# Patient Record
Sex: FEMALE | URBAN - METROPOLITAN AREA
[De-identification: names, ages, dates, MRNs, and addresses within clinical notes are randomized per-mention and may not be internally consistent; named-entity substitution may affect disease eponyms.]

---

## 2020-04-22 ENCOUNTER — HOSPITAL ENCOUNTER (OUTPATIENT)
Dept: TELEMEDICINE | Facility: HOSPITAL | Age: 80
Discharge: HOME | End: 2020-04-22
Payer: MEDICARE

## 2020-04-22 ENCOUNTER — HOSPITAL ENCOUNTER (INPATIENT)
Facility: HOSPITAL | Age: 80
LOS: 8 days | Discharge: NURSING, REHAB OR LONG-TERM CARE | DRG: 023 | End: 2020-04-30
Attending: STUDENT IN AN ORGANIZED HEALTH CARE EDUCATION/TRAINING PROGRAM | Admitting: PSYCHIATRY & NEUROLOGY
Payer: MEDICARE

## 2020-04-22 DIAGNOSIS — I63.512 ACUTE ISCHEMIC LEFT MCA STROKE: ICD-10-CM

## 2020-04-22 DIAGNOSIS — I63.232 STROKE DUE TO OCCLUSION OF LEFT CAROTID ARTERY: ICD-10-CM

## 2020-04-22 DIAGNOSIS — I63.232 ACUTE ISCHEMIC LEFT INTERNAL CAROTID ARTERY (ICA) STROKE: ICD-10-CM

## 2020-04-22 DIAGNOSIS — I10 ESSENTIAL HYPERTENSION: ICD-10-CM

## 2020-04-22 PROBLEM — E78.5 HYPERLIPIDEMIA: Status: ACTIVE | Noted: 2020-04-22

## 2020-04-22 PROBLEM — G93.6 CYTOTOXIC CEREBRAL EDEMA: Status: ACTIVE | Noted: 2020-04-22

## 2020-04-22 LAB
ABO + RH BLD: NORMAL
BLD GP AB SCN CELLS X3 SERPL QL: NORMAL
CHOLEST SERPL-MCNC: 220 MG/DL (ref 120–199)
CHOLEST/HDLC SERPL: 3.6 {RATIO} (ref 2–5)
ESTIMATED AVG GLUCOSE: 114 MG/DL (ref 68–131)
HBA1C MFR BLD HPLC: 5.6 % (ref 4–5.6)
HDLC SERPL-MCNC: 61 MG/DL (ref 40–75)
HDLC SERPL: 27.7 % (ref 20–50)
LDLC SERPL CALC-MCNC: 135.2 MG/DL (ref 63–159)
NONHDLC SERPL-MCNC: 159 MG/DL
POCT GLUCOSE: 105 MG/DL (ref 70–110)
POCT GLUCOSE: 88 MG/DL (ref 70–110)
SARS-COV-2 RDRP RESP QL NAA+PROBE: NEGATIVE
TRIGL SERPL-MCNC: 119 MG/DL (ref 30–150)
TSH SERPL DL<=0.005 MIU/L-ACNC: 3.99 UIU/ML (ref 0.4–4)

## 2020-04-22 PROCEDURE — 99223 PR INITIAL HOSPITAL CARE,LEVL III: ICD-10-PCS | Mod: ,,, | Performed by: PSYCHIATRY & NEUROLOGY

## 2020-04-22 PROCEDURE — 63600175 PHARM REV CODE 636 W HCPCS: Performed by: INTERNAL MEDICINE

## 2020-04-22 PROCEDURE — 94761 N-INVAS EAR/PLS OXIMETRY MLT: CPT

## 2020-04-22 PROCEDURE — 93005 ELECTROCARDIOGRAM TRACING: CPT

## 2020-04-22 PROCEDURE — 36620 INSERTION CATHETER ARTERY: CPT | Mod: ,,, | Performed by: PSYCHIATRY & NEUROLOGY

## 2020-04-22 PROCEDURE — 96374 THER/PROPH/DIAG INJ IV PUSH: CPT

## 2020-04-22 PROCEDURE — G0425 PR INPT TELEHEALTH CONSULT 30M: ICD-10-PCS | Mod: 95,G0,, | Performed by: PSYCHIATRY & NEUROLOGY

## 2020-04-22 PROCEDURE — 80061 LIPID PANEL: CPT

## 2020-04-22 PROCEDURE — 25000003 PHARM REV CODE 250: Performed by: STUDENT IN AN ORGANIZED HEALTH CARE EDUCATION/TRAINING PROGRAM

## 2020-04-22 PROCEDURE — 99223 1ST HOSP IP/OBS HIGH 75: CPT | Mod: ,,, | Performed by: PSYCHIATRY & NEUROLOGY

## 2020-04-22 PROCEDURE — 99223 1ST HOSP IP/OBS HIGH 75: CPT | Mod: AI,25,GC, | Performed by: PSYCHIATRY & NEUROLOGY

## 2020-04-22 PROCEDURE — 25000003 PHARM REV CODE 250: Performed by: PSYCHIATRY & NEUROLOGY

## 2020-04-22 PROCEDURE — 99499 UNLISTED E&M SERVICE: CPT | Mod: ,,, | Performed by: EMERGENCY MEDICINE

## 2020-04-22 PROCEDURE — 86850 RBC ANTIBODY SCREEN: CPT

## 2020-04-22 PROCEDURE — 93010 ELECTROCARDIOGRAM REPORT: CPT | Mod: ,,, | Performed by: INTERNAL MEDICINE

## 2020-04-22 PROCEDURE — 36620 ARTERIAL LINE: ICD-10-PCS | Mod: ,,, | Performed by: PSYCHIATRY & NEUROLOGY

## 2020-04-22 PROCEDURE — 99285 EMERGENCY DEPT VISIT HI MDM: CPT | Mod: 25

## 2020-04-22 PROCEDURE — 93010 EKG 12-LEAD: ICD-10-PCS | Mod: ,,, | Performed by: INTERNAL MEDICINE

## 2020-04-22 PROCEDURE — 99499 NO LOS: ICD-10-PCS | Mod: ,,, | Performed by: EMERGENCY MEDICINE

## 2020-04-22 PROCEDURE — 99223 PR INITIAL HOSPITAL CARE,LEVL III: ICD-10-PCS | Mod: AI,25,GC, | Performed by: PSYCHIATRY & NEUROLOGY

## 2020-04-22 PROCEDURE — 63600175 PHARM REV CODE 636 W HCPCS: Performed by: STUDENT IN AN ORGANIZED HEALTH CARE EDUCATION/TRAINING PROGRAM

## 2020-04-22 PROCEDURE — 84443 ASSAY THYROID STIM HORMONE: CPT

## 2020-04-22 PROCEDURE — 83036 HEMOGLOBIN GLYCOSYLATED A1C: CPT

## 2020-04-22 PROCEDURE — 20000000 HC ICU ROOM

## 2020-04-22 PROCEDURE — 63600175 PHARM REV CODE 636 W HCPCS

## 2020-04-22 PROCEDURE — 25000003 PHARM REV CODE 250: Performed by: INTERNAL MEDICINE

## 2020-04-22 PROCEDURE — 25500020 PHARM REV CODE 255: Performed by: EMERGENCY MEDICINE

## 2020-04-22 PROCEDURE — 96376 TX/PRO/DX INJ SAME DRUG ADON: CPT

## 2020-04-22 PROCEDURE — U0002 COVID-19 LAB TEST NON-CDC: HCPCS

## 2020-04-22 PROCEDURE — G0425 INPT/ED TELECONSULT30: HCPCS | Mod: 95,G0,, | Performed by: PSYCHIATRY & NEUROLOGY

## 2020-04-22 RX ORDER — HEPARIN SODIUM 1000 [USP'U]/ML
INJECTION, SOLUTION INTRAVENOUS; SUBCUTANEOUS CODE/TRAUMA/SEDATION MEDICATION
Status: COMPLETED | OUTPATIENT
Start: 2020-04-22 | End: 2020-04-22

## 2020-04-22 RX ORDER — MAGNESIUM SULFATE HEPTAHYDRATE 40 MG/ML
2 INJECTION, SOLUTION INTRAVENOUS
Status: DISCONTINUED | OUTPATIENT
Start: 2020-04-22 | End: 2020-04-26

## 2020-04-22 RX ORDER — NICARDIPINE HYDROCHLORIDE 0.2 MG/ML
1 INJECTION INTRAVENOUS CONTINUOUS
Status: DISCONTINUED | OUTPATIENT
Start: 2020-04-22 | End: 2020-04-24

## 2020-04-22 RX ORDER — FENTANYL CITRATE 50 UG/ML
INJECTION, SOLUTION INTRAMUSCULAR; INTRAVENOUS CODE/TRAUMA/SEDATION MEDICATION
Status: COMPLETED | OUTPATIENT
Start: 2020-04-22 | End: 2020-04-22

## 2020-04-22 RX ORDER — MAGNESIUM SULFATE HEPTAHYDRATE 40 MG/ML
4 INJECTION, SOLUTION INTRAVENOUS
Status: DISCONTINUED | OUTPATIENT
Start: 2020-04-22 | End: 2020-04-26

## 2020-04-22 RX ORDER — LIDOCAINE HYDROCHLORIDE 10 MG/ML
1 INJECTION INFILTRATION; PERINEURAL ONCE
Status: COMPLETED | OUTPATIENT
Start: 2020-04-22 | End: 2020-04-22

## 2020-04-22 RX ORDER — POTASSIUM CHLORIDE 7.45 MG/ML
40 INJECTION INTRAVENOUS
Status: DISCONTINUED | OUTPATIENT
Start: 2020-04-22 | End: 2020-04-26

## 2020-04-22 RX ORDER — ASPIRIN 81 MG/1
81 TABLET ORAL DAILY
Status: DISCONTINUED | OUTPATIENT
Start: 2020-04-23 | End: 2020-04-30 | Stop reason: HOSPADM

## 2020-04-22 RX ORDER — CLOPIDOGREL BISULFATE 75 MG/1
75 TABLET ORAL DAILY
Status: DISCONTINUED | OUTPATIENT
Start: 2020-04-23 | End: 2020-04-30 | Stop reason: HOSPADM

## 2020-04-22 RX ORDER — SODIUM CHLORIDE 0.9 % (FLUSH) 0.9 %
10 SYRINGE (ML) INJECTION
Status: DISCONTINUED | OUTPATIENT
Start: 2020-04-22 | End: 2020-04-26

## 2020-04-22 RX ORDER — ONDANSETRON 2 MG/ML
4 INJECTION INTRAMUSCULAR; INTRAVENOUS EVERY 8 HOURS PRN
Status: DISCONTINUED | OUTPATIENT
Start: 2020-04-22 | End: 2020-04-30 | Stop reason: HOSPADM

## 2020-04-22 RX ORDER — LIDOCAINE HYDROCHLORIDE 10 MG/ML
INJECTION INFILTRATION; PERINEURAL CODE/TRAUMA/SEDATION MEDICATION
Status: COMPLETED | OUTPATIENT
Start: 2020-04-22 | End: 2020-04-22

## 2020-04-22 RX ORDER — AMOXICILLIN 250 MG
1 CAPSULE ORAL 2 TIMES DAILY
Status: DISCONTINUED | OUTPATIENT
Start: 2020-04-22 | End: 2020-04-30 | Stop reason: HOSPADM

## 2020-04-22 RX ORDER — ATORVASTATIN CALCIUM 20 MG/1
40 TABLET, FILM COATED ORAL DAILY
Status: DISCONTINUED | OUTPATIENT
Start: 2020-04-23 | End: 2020-04-30 | Stop reason: HOSPADM

## 2020-04-22 RX ORDER — ONDANSETRON 2 MG/ML
4 INJECTION INTRAMUSCULAR; INTRAVENOUS ONCE
Status: COMPLETED | OUTPATIENT
Start: 2020-04-22 | End: 2020-04-22

## 2020-04-22 RX ORDER — POLYETHYLENE GLYCOL 3350 17 G/17G
17 POWDER, FOR SOLUTION ORAL DAILY
Status: DISCONTINUED | OUTPATIENT
Start: 2020-04-23 | End: 2020-04-30 | Stop reason: HOSPADM

## 2020-04-22 RX ORDER — ONDANSETRON 2 MG/ML
INJECTION INTRAMUSCULAR; INTRAVENOUS
Status: COMPLETED
Start: 2020-04-22 | End: 2020-04-22

## 2020-04-22 RX ORDER — FAMOTIDINE 20 MG/1
20 TABLET, FILM COATED ORAL 2 TIMES DAILY
Status: DISCONTINUED | OUTPATIENT
Start: 2020-04-22 | End: 2020-04-23

## 2020-04-22 RX ORDER — ACETAMINOPHEN 325 MG/1
650 TABLET ORAL EVERY 6 HOURS PRN
Status: DISCONTINUED | OUTPATIENT
Start: 2020-04-22 | End: 2020-04-30 | Stop reason: HOSPADM

## 2020-04-22 RX ADMIN — IOHEXOL 250 ML: 300 INJECTION, SOLUTION INTRAVENOUS at 05:04

## 2020-04-22 RX ADMIN — FENTANYL CITRATE 50 MCG: 50 INJECTION, SOLUTION INTRAMUSCULAR; INTRAVENOUS at 03:04

## 2020-04-22 RX ADMIN — FENTANYL CITRATE 25 MCG: 50 INJECTION, SOLUTION INTRAMUSCULAR; INTRAVENOUS at 04:04

## 2020-04-22 RX ADMIN — FENTANYL CITRATE 50 MCG: 50 INJECTION, SOLUTION INTRAMUSCULAR; INTRAVENOUS at 02:04

## 2020-04-22 RX ADMIN — LIDOCAINE HYDROCHLORIDE: 10 INJECTION, SOLUTION EPIDURAL; INFILTRATION; INTRACAUDAL; PERINEURAL at 06:04

## 2020-04-22 RX ADMIN — FENTANYL CITRATE 25 MCG: 50 INJECTION, SOLUTION INTRAMUSCULAR; INTRAVENOUS at 03:04

## 2020-04-22 RX ADMIN — HEPARIN SODIUM 3000 UNITS: 1000 INJECTION INTRAVENOUS; SUBCUTANEOUS at 02:04

## 2020-04-22 RX ADMIN — LIDOCAINE HYDROCHLORIDE 5 ML: 10 INJECTION, SOLUTION INFILTRATION; PERINEURAL at 02:04

## 2020-04-22 RX ADMIN — NICARDIPINE HYDROCHLORIDE 5 MG/HR: 0.2 INJECTION, SOLUTION INTRAVENOUS at 11:04

## 2020-04-22 RX ADMIN — FENTANYL CITRATE 50 MCG: 50 INJECTION, SOLUTION INTRAMUSCULAR; INTRAVENOUS at 04:04

## 2020-04-22 RX ADMIN — ONDANSETRON 4 MG: 2 INJECTION, SOLUTION INTRAMUSCULAR; INTRAVENOUS at 06:04

## 2020-04-22 NOTE — SUBJECTIVE & OBJECTIVE
No past medical history on file.  No past surgical history on file.   No current facility-administered medications on file prior to encounter.      No current outpatient medications on file prior to encounter.      Allergies: Patient has no known allergies.    No family history on file.  Social History     Tobacco Use    Smoking status: Not on file   Substance Use Topics    Alcohol use: Not on file    Drug use: Not on file     Review of Systems   Constitutional: Negative for chills and fever.   Respiratory: Negative for cough.    Gastrointestinal: Negative for nausea and vomiting.   Neurological: Positive for facial asymmetry, speech difficulty and weakness. Negative for numbness.   Psychiatric/Behavioral: Negative for agitation.        Objective:     Vitals:    Pulse: (!) 51  Rhythm: sinus bradycardia  BP: (!) 220/116  Resp: 16  ETCO2 (mmHg): 38 mmHg  SpO2: 100 %  O2 Device (Oxygen Therapy): room air    Pulse  Min: 50  Max: 76  BP  Min: 132/75  Max: 225/101  Resp  Min: 16  Max: 19  ETCO2 (mmHg)  Min: 28 mmHg  Max: 39 mmHg  SpO2  Min: 98 %  Max: 100 %    No intake/output data recorded.         Physical Exam:  GA: Alert, comfortable, no acute distress.   HEENT: No scleral icterus or JVD.   Pulmonary: Clear to auscultation A/L. No wheezing, crackles, or rhonchi.  Cardiac: RRR S1 & S2 w/o rubs/murmurs/gallops.   Abdominal: Bowel sounds present x 4. No appreciable hepatosplenomegaly.  Skin: No jaundice, rashes, or visible lesions.  Pulses: 1+ DP bilat     Neuro:  --sedation: none  --GCS: E4V2M5  --Mental Status: awake, Global aphasia, follows commands    --CN II-XII grossly intact, Facial Movement (CN VII): Lower facial weakness on the Right.   --Pupils 3-->2mm, PERRL.   --brainstem: intact  --Motor:   --Arm left  Normal 5/5  --Leg left  Normal 5/5  --Arm right  Normal 5/5  --Leg right Paresis: 4/5  --Sensation: Intact to light touch, temperature and vibration  --Tone: Normal tone throughout  --sensory: intact to  soft touch and pain throughout  --Reflexes: not tested  --Gait: deferred     Unable to test orientation, language, memory, judgment, insight, fund of knowledge, hearing, shoulder shrug, tongue protrusion, coordination, gait due to level of consciousness.    Today I personally reviewed pertinent medications, lines/drains/airways, imaging, cardiology results, laboratory results, microbiology results, notably:    No results found for: HGBA1C, CHOL, HDL, LDLCALC, LDLDIRECT, TRIG, SEDRATE, CRP, HSCRP, TSH, HIVRAPID, HIV1X2, [id number]

## 2020-04-22 NOTE — NURSING
MD holding manual pressure to right groin, hemostasis achieved @ 1725, unable to deploy angioseal, pt is to remain flat until 2125, dressing CDI, no bleeding, no hematoma noted, sbp goal 160-180 per MD, report given to [facility]

## 2020-04-22 NOTE — HOSPITAL COURSE
04/22/20: Patient admitted to [facility] s/p thrombectomy for L ICA occlusion  04/23/20: CARLOS EDUARDO, patient globally aphasic. Patient's friend updated as no family contact is available.  04/24/20: CARLOS EDUARDO, stepdown to vascular Neurology today

## 2020-04-22 NOTE — CONSULTS
[facility]  Vascular Neurology  Comprehensive Stroke Center  Consult Note    Inpatient consult to Vascular (Stroke) Neurology  Consult performed by: Sherice Marroquin PA-C  Consult ordered by: Bc Robles MD        Assessment/Plan:     Patient is a 79 y.o. 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 female with PMHx of HTN and HLD who presented to OSH with RSW and aphasia. She was seen via telemedicine and not eligible for tPA (last known normal yesterday). CTA obtained at OSH with L ICA occlusion and patient transferred to [facility] [place] intervention. CT head on arrival to C with ischemic changes in L insula. Patient going to IR for possible thrombectomy. Etiology unclear at this time, differential includes KIM vs ESUS.      Antithrombotics for secondary stroke prevention: Antiplatelets: Aspirin: 81 mg daily    Statins for secondary stroke prevention and hyperlipidemia, if present:   Statins: Atorvastatin- 40 mg daily    Aggressive risk factor modification: HTN, HLD     Rehab efforts: The patient has been evaluated by a stroke team provider and the therapy needs have been fully considered based off the presenting complaints and exam findings. The following therapy evaluations are needed: PT evaluate and treat, OT evaluate and treat, SLP evaluate and treat, PM&R evaluate for appropriate placement    Diagnostics ordered/pending: HgbA1C to assess blood glucose levels, Lipid Profile to assess cholesterol levels, MRI head without contrast to assess brain parenchyma, TTE to assess cardiac function/status , TSH to assess thyroid function    VTE prophylaxis: Heparin 5000 units SQ every 8 hours    BP parameters: Infarct: Post sucessful thrombectomy, SBP <140  Post unsucessful thrombectomy, SBP <220        Cytotoxic cerebral edema  Area of cytotoxic cerebral edema identified when reviewing brain imaging in the territory of  the L internal carotid artery. There is no mass effect associated with it. We will continue to monitor the patients clinical exam for any worsening of symptoms which may indicate expansion of the stroke or the area of the edema resulting in the clinical change. The pattern is suggestive of KIM vs ESUS etiology.        Hyperlipidemia  Stroke risk factor  Lipid panel pending  Home medications unknown  Recommend starting atorvastatin 40 mg daily    Essential hypertension  Stroke risk factor  SBP <220 for unsuccessful thrombectomy  SBP <140 for successful thrombectomy        STROKE DOCUMENTATION     Acute Stroke Times   Last Known Normal Date: 04/21/20  Last Known Normal Time: 1800  Symptom Onset Date: (unknown)  Symptom Onset Time: (unknown)  Stroke Team Called Date: 04/22/20  Stroke Team Called Time: 1415  Stroke Team Arrival Date: 04/22/20  Stroke Team Arrival Time: 1415  CT Interpretation Time: 1425  Decision to Treat Time for Alteplase: (decision made at OSH via telemedicine)  Decision to Treat Time for IR: 1425    NIH Scale:  Interval: baseline  1a. Level of Consciousness: 0-->Alert, keenly responsive  1b. LOC Questions: 2-->Answers neither question correctly  1c. LOC Commands: 2-->Performs neither task correctly  2. Best Gaze: 0-->Normal  3. Visual: 0-->No visual loss  4. Facial Palsy: 1-->Minor paralysis (flattened nasolabial fold, asymmetry on smiling)  5a. Motor Arm, Left: 0-->No drift, limb holds 90 (or 45) degrees for full 10 secs  5b. Motor Arm, Right: 0-->No drift, limb holds 90 (or 45) degrees for full 10 secs  6a. Motor Leg, Left: 0-->No drift, leg holds 30 degree position for full 5 secs  6b. Motor Leg, Right: 1-->Drift, leg falls by the end of the 5-sec period but does not hit bed  7. Limb Ataxia: 0-->Absent  8. Sensory: 0-->Normal, no sensory loss  9. Best Language: 3-->Mute, global aphasia, no usable speech or auditory comprehension  10. Dysarthria: 2-->Severe dysarthria, patients speech is so  slurred as to be unintelligible in the absence of or out of proportion to any dysphasia, or is mute/anarthric  11. Extinction and Inattention (formerly Neglect): 0-->No abnormality  Total (NIH Stroke Scale): 11    Modified [employer]    Jennifer Coma Scale:    ABCD2 Score:    SLIA2YQ3-[id number] Score:   HAS -BLED Score:   ICH Score:   Hunt & Dahl Classification:       Thrombolysis Candidate? No, Out of window     Delays to Thrombolysis?  No    Interventional Revascularization Candidate?   Is the patient eligible for mechanical endovascular reperfusion (MIK)?  Yes      Hemorrhagic change of an Ischemic Stroke: Does this patient have an ischemic stroke with hemorrhagic changes? No     Subjective:     History of Present Illness:  Teresa Vaughan is a 79 y.o. female with PMHx of HTN and HLD who presented to [facility] with aphasia and RSW. Patient went to ED after being found outside wandering around by her neighbors. She was seen via telemedicine and was not a candidate for tPA (last known normal yesterday). CTA at OSH revealed L ICA occlusion. Patient transferred to [facility] for possible IR intervention. Patient primarily Portuguese speaking and aphasic. Therefore, history obtained via chart review.         No past medical history on file.  No past surgical history on file.  No family history on file.  Social History     Tobacco Use    Smoking status: Not on file   Substance Use Topics    Alcohol use: Not on file    Drug use: Not on file     Review of patient's allergies indicates:  No Known Allergies    Medications: I have reviewed the current medication administration record.      (Not in a hospital admission)    Review of Systems   Constitutional: Negative for chills and fever.   Respiratory: Negative for cough.    Gastrointestinal: Negative for nausea and vomiting.   Neurological: Positive for facial asymmetry, speech difficulty and weakness. Negative for numbness.   Psychiatric/Behavioral: Negative for agitation.      Objective:     Vital Signs (Most Recent):  Pulse: 60 (04/22/20 1500)  Resp: 18 (04/22/20 1500)  BP: (!) 205/86 (04/22/20 1500)  SpO2: 99 % (04/22/20 1500)    Vital Signs Range (Last 24H):  Pulse:  [58-74]   Resp:  [18]   BP: (176-223)/()   SpO2:  [98 %-100 %]     Physical Exam   Constitutional: She appears well-developed and well-nourished. No distress.   HENT:   Head: Normocephalic and atraumatic.   Eyes: EOM are normal.   Cardiovascular: Normal rate.   Pulmonary/Chest: Effort normal. No respiratory distress.   Neurological: She is alert.   Skin: Skin is warm and dry.   Vitals reviewed.      Neurological Exam:   LOC: alert  Attention Span: poor  Language: Global aphasia  Articulation: Untestable due to severe aphasia   Orientation: Untestable due to severe aphasia   Visual Fields: Full  EOM (CN III, IV, VI): Full/intact  Facial Movement (CN VII): Lower facial weakness on the Right  Motor: Arm left  Normal 5/5  Leg left  Normal 5/5  Arm right  Normal 5/5  Leg right Paresis: 4/5  Sensation: Intact to light touch, temperature and vibration  Tone: Normal tone throughout      Laboratory:  CMP: No results for input(s): GLUCOSE, CALCIUM, ALBUMIN, PROT, NA, K, CO2, CL, BUN, CREATININE, ALKPHOS, ALT, AST, BILITOT in the last 24 hours.  CBC: No results for input(s): WBC, RBC, HGB, HCT, PLT, MCV, MCH, MCHC in the last 168 hours.  Lipid Panel: No results for input(s): CHOL, LDLCALC, HDL, TRIG in the last 168 hours.  Coagulation: No results for input(s): PT, INR, APTT in the last 168 hours.  Hgb A1C: No results for input(s): HGBA1C in the last 168 hours.  TSH: No results for input(s): TSH in the last 168 hours.    Diagnostic Results:      Brain imaging:  CT Head. Date: 04/22/20  Ill-defined region of decreased attenuation left insula anteriorly concerning for acute/recent infarction.  No significant mass effect or evidence for hemorrhagic conversion.    Vessel Imaging:  CTA OSH. Date: 04/22/20  L ICA  occlusion          Sherice Marroquin PA-C  Comprehensive Stroke Center  Department of Vascular Neurology   [facility]

## 2020-04-22 NOTE — HPI
Ms. Clement [facility] is a 79 year old lady with PMH significant for HTN, HLD who was transferred from [facility] with aphasia and RSW. Patient had initially been taken to the ED when her neighbors found her wandering around outside. She presented with right sided weakness and aphasia to the OSH ED and was evaluated via Tele stroke. Since she was last known well yesterday 04/21/20, she was not a candidate for TPA. CTA at OSH revealed L ICA occlusion and she was transferred to [facility] for possible IR intervention. Patient is primarily Faroese speaking and aphasic with no family present, Therefore all history was obtained via chart review. Patient tested negative for COVID19 and CT head on arrival to C revealed ischemic changes in L insula. She was taken to IR for possible thrombectomy with etiology of stroke unclear at this time. Likely embolic, differential includes KIM vs ESUS.  Patient admitted to [facility] s/p thrombectomy for higher level of care.

## 2020-04-22 NOTE — H&P
Inpatient Radiology Pre-procedure Note    History of Present Illness:  Teresa Vaughan is a 79 y.o. female who presents for Left ICA occlusion and aphasia for thrombectomy.    Admission H&P reviewed.  No past medical history on file.  No past surgical history on file.    Review of Systems:   As documented in primary team H&P    Home Meds:   Prior to Admission medications    Not on File     Scheduled Meds:   Continuous Infusions:   PRN Meds:  Anticoagulants/Antiplatelets: no anticoagulation    Allergies: Review of patient's allergies indicates:  No Known Allergies  Sedation Hx: have not been any systemic reactions    Labs:  No results for input(s): INR in the last 168 hours.    Invalid input(s):  PT,  PTT  No results for input(s): WBC, HGB, HCT, MCV, PLT in the last 168 hours. No results for input(s): GLU, NA, K, CL, CO2, BUN, CREATININE, CALCIUM, MG, ALT, AST, ALBUMIN, BILITOT, BILIDIR in the last 168 hours.      Vitals:  Pulse: 74 (04/22/20 1418)  Resp: 18 (04/22/20 1418)  BP: (!) 202/93 (04/22/20 1418)  SpO2: 99 % (04/22/20 1418)     Physical Exam:  ASA: 3  Mallampati: 2    Aphasia    Plan: Left ICA thrombectomy  Sedation Plan: Moderate sedation    Harpreet Dominguez MD  NeuroIR fellow.

## 2020-04-22 NOTE — PROGRESS NOTES
Patient arrived to [facility] from [facility] ED >  > [facility] 7[address]    Type of stroke/diagnosis: ICA Occlusion - TICI 2A    Thrombectomy start and end time: 1725    Current symptoms: Moves all 4 extremities (RUE - contracted),     Skin assessment done: Yes   Wounds noted: None   RUSSELL Armband Applied: Yes    [facility] notified: MD Bc

## 2020-04-22 NOTE — PROCEDURES
Teresa Vaughan is a 79 y.o. female patient.    Temp: 97.6 °F (36.4 °C) (04/22/20 1810)  Pulse: (!) 55 (04/22/20 1842)  Resp: 14 (04/22/20 1842)  BP: (!) 142/57 (04/22/20 1842)  SpO2: 96 % (04/22/20 1842)  Weight: 51 kg (112 lb 7 oz) (04/22/20 1740)       Arterial Line  Date/Time: 4/22/2020 6:45 PM  Location procedure was performed: [facility]  Performed by: Bc Robles MD  Authorized by: Bc Robles MD   Assisting provider: Kenneth Morgan MD  Pre-op Diagnosis: Left ICA Stenosis  Post-operative diagnosis: left ICA stenosis  Consent Done: Emergent Situation  Indications: multiple ABGs and hemodynamic monitoring  Location: left radial    Anesthesia:  Local Anesthetic: lidocaine 1% without epinephrine  Needle gauge: 22  Seldinger technique: Seldinger technique used  Number of attempts: 1  Technical procedures used: n/a  Significant surgical tasks conducted by the assistant(s): N/A  Complications: No  Specimens: No  Implants: No  Post-procedure: dressing applied  Post-procedure CMS: normal  Patient tolerance: Patient tolerated the procedure well with no immediate complications          Bc Robles  4/22/2020

## 2020-04-22 NOTE — ASSESSMENT & PLAN NOTE
Teresa Taylor is a 79 year old female who was LKN on 04/21/20 found wandering around outside by neighbors today 04/22/20, taken to the ED presenting with RSW and aphasia, CTA at OSH revealed L ICA occlusion and patient was transferred to [facility] for thrombectomy. Patient admitted to [facility] s/p acute intervention. Patient is primarily [place] Islander speaking and aphasic with no family present, Therefore all history was obtained via chart review. Etiology of stroke unclear at this time. Likely embolic, differential includes KIM vs ESUS.    Neuro:      Embolic stroke involving left Internal Carotid Artery  -S/p thrombectomy, 4/22 TICI 2A  -neuro checks and vital signs q30 min x 6hrs post procedure, then q1hr x 16hrs  -aspirin 325 and statin 40mg daily   -Plavix 75mg daily  -MRI first tomorrow then start DAPT        Pulmonary:      Supportive oxygenation  -monitor with continuous pulse oximetry  -titrate supplemental 02 to achieve sats > 92%       Cardiac:      Hypertension  -if needed, start nicardipene gtt @5mg/hr   -goal SBP<140        Renal:       Fluid balance   -strict I/Os        ID:      COVID negative, afebrile, no Leukocytosis     Hem/Onc:      Monitor H/H closely    -aspirin 325 daily,      Endocrine:     Follow A1C results  Glucose control  -POCT q6hrs      Fluids/Electrolytes/Nutrition/GI:      -NPO until speech evaluation   -NG tube for meds

## 2020-04-22 NOTE — H&P
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 Clement [facility] is a 79 year old lady with PMH significant for HTN, HLD who was transferred from [facility] with aphasia and RSW. Patient had initially been taken to the ED when her neighbors found her wandering around outside. She presented with right sided weakness and aphasia to the OSH ED and was evaluated via Tele stroke. Since she was last known well yesterday 04/21/20, she was not a candidate for TPA. CTA at OSH revealed L ICA occlusion and she was transferred to [facility] for possible IR intervention. Patient is primarily Swedish speaking and aphasic with no family present, Therefore all history was obtained via chart review. Patient tested negative for COVID19 and CT head on arrival to [facility] revealed ischemic changes in L insula. She was taken to IR for possible thrombectomy with etiology of stroke unclear at this time. Likely embolic, differential includes KIM vs ESUS.  Patient admitted to [facility] s/p thrombectomy for higher level of care.    No past medical history on file.  No past surgical history on file.   No current facility-administered medications on file prior to encounter.      No current outpatient medications on file prior to encounter.      Allergies: Patient has no known allergies.    No family history on file.  Social History     Tobacco Use    Smoking status: Not on file   Substance Use Topics    Alcohol use: Not on file    Drug use: Not on file     Review of Systems   Constitutional: Negative for chills and fever.   Respiratory: Negative for cough.    Gastrointestinal: Negative for nausea and vomiting.   Neurological: Positive for facial asymmetry, speech difficulty and weakness. Negative for numbness.    Psychiatric/Behavioral: Negative for agitation.        Objective:     Vitals:    Pulse: (!) 51  Rhythm: sinus bradycardia  BP: (!) 220/116  Resp: 16  ETCO2 (mmHg): 38 mmHg  SpO2: 100 %  O2 Device (Oxygen Therapy): room air    Pulse  Min: 50  Max: 76  BP  Min: 132/75  Max: 225/101  Resp  Min: 16  Max: 19  ETCO2 (mmHg)  Min: 28 mmHg  Max: 39 mmHg  SpO2  Min: 98 %  Max: 100 %    No intake/output data recorded.         Physical Exam:  GA: Alert, comfortable, no acute distress.   HEENT: No scleral icterus or JVD.   Pulmonary: Clear to auscultation A/L. No wheezing, crackles, or rhonchi.  Cardiac: RRR S1 & S2 w/o rubs/murmurs/gallops.   Abdominal: Bowel sounds present x 4. No appreciable hepatosplenomegaly.  Skin: No jaundice, rashes, or visible lesions.  Pulses: 1+ DP bilat     Neuro:  --sedation: none  --GCS: E4V2M5  --Mental Status: awake, Global aphasia, follows commands    --CN II-XII grossly intact, Facial Movement (CN VII): Lower facial weakness on the Right.   --Pupils 3-->2mm, PERRL.   --brainstem: intact  --Motor:   --Arm left  Normal 5/5  --Leg left  Normal 5/5  --Arm right  Normal 5/5  --Leg right Paresis: 4/5  --Sensation: Intact to light touch, temperature and vibration  --Tone: Normal tone throughout  --sensory: intact to soft touch and pain throughout  --Reflexes: not tested  --Gait: deferred     Unable to test orientation, language, memory, judgment, insight, fund of knowledge, hearing, shoulder shrug, tongue protrusion, coordination, gait due to level of consciousness.    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 Patient admitted to [facility] s/p acute intervention. Patient is primarily Persian speaking and aphasic with no family present, Therefore all history was obtained via chart review. Etiology of stroke unclear at this time. Likely embolic, differential includes KIM vs ESUS.    Neuro:      Embolic stroke involving left Internal Carotid Artery  -S/p thrombectomy, 4/22 TICI 2A  -neuro checks and vital signs q30 min x 6hrs post procedure, then q1hr x 16hrs  -aspirin 325 and statin 40mg daily   -Plavix 75mg daily  -MRI first tomorrow then start DAPT        Pulmonary:      Supportive oxygenation  -monitor with continuous pulse oximetry  -titrate supplemental 02 to achieve sats > 92%       Cardiac:      Hypertension  -if needed, start nicardipene gtt @5mg/hr   -goal SBP<140        Renal:       Fluid balance   -strict I/Os        ID:      COVID negative, afebrile, no Leukocytosis     Hem/Onc:      Monitor H/H closely    -aspirin 325 daily,      Endocrine:     Follow A1C results  Glucose control  -POCT q6hrs      Fluids/Electrolytes/Nutrition/GI:      -NPO until speech evaluation   -NG tube for meds          Cytotoxic cerebral edema  Monitor closely, repeat head imaging MRI in 24hrs    Cardiac/Vascular  Hyperlipidemia  Atorvastatin 40mg daily    Essential hypertension  Goal SBP <140          The patient is being Prophylaxed for:  Venous Thromboembolism with: Mechanical  Stress Ulcer with: Not Applicable   Ventilator Pneumonia with: not applicable    Activity Orders          Diet NPO: NPO starting at 04/22 1608        Full Code    Bc Robles MD  Neurocritical Care Fellow  Neurocritical care  [facility]

## 2020-04-22 NOTE — SUBJECTIVE & OBJECTIVE
No past medical history on file.  No past surgical history on file.  No family history on file.  Social History     Tobacco Use    Smoking status: Not on file   Substance Use Topics    Alcohol use: Not on file    Drug use: Not on file     Review of patient's allergies indicates:  No Known Allergies    Medications: I have reviewed the current medication administration record.      (Not in a hospital admission)    Review of Systems   Constitutional: Negative for chills and fever.   Respiratory: Negative for cough.    Gastrointestinal: Negative for nausea and vomiting.   Neurological: Positive for facial asymmetry, speech difficulty and weakness. Negative for numbness.   Psychiatric/Behavioral: Negative for agitation.     Objective:     Vital Signs (Most Recent):  Pulse: 60 (04/22/20 1500)  Resp: 18 (04/22/20 1500)  BP: (!) 205/86 (04/22/20 1500)  SpO2: 99 % (04/22/20 1500)    Vital Signs Range (Last 24H):  Pulse:  [58-74]   Resp:  [18]   BP: (176-223)/()   SpO2:  [98 %-100 %]     Physical Exam   Constitutional: She appears well-developed and well-nourished. No distress.   HENT:   Head: Normocephalic and atraumatic.   Eyes: EOM are normal.   Cardiovascular: Normal rate.   Pulmonary/Chest: Effort normal. No respiratory distress.   Neurological: She is alert.   Skin: Skin is warm and dry.   Vitals reviewed.      Neurological Exam:   LOC: alert  Attention Span: poor  Language: Global aphasia  Articulation: Untestable due to severe aphasia   Orientation: Untestable due to severe aphasia   Visual Fields: Full  EOM (CN III, IV, VI): Full/intact  Facial Movement (CN VII): Lower facial weakness on the Right  Motor: Arm left  Normal 5/5  Leg left  Normal 5/5  Arm right  Normal 5/5  Leg right Paresis: 4/5  Sensation: Intact to light touch, temperature and vibration  Tone: Normal tone throughout      Laboratory:  CMP: No results for input(s): GLUCOSE, CALCIUM, ALBUMIN, PROT, NA, K, CO2, CL, BUN, CREATININE, ALKPHOS,  ALT, AST, BILITOT in the last 24 hours.  CBC: No results for input(s): WBC, RBC, HGB, HCT, PLT, MCV, MCH, MCHC in the last 168 hours.  Lipid Panel: No results for input(s): CHOL, LDLCALC, HDL, TRIG in the last 168 hours.  Coagulation: No results for input(s): PT, INR, APTT in the last 168 hours.  Hgb A1C: No results for input(s): HGBA1C in the last 168 hours.  TSH: No results for input(s): TSH in the last 168 hours.    Diagnostic Results:      Brain imaging:  CT Head. Date: 04/22/20  Ill-defined region of decreased attenuation left insula anteriorly concerning for acute/recent infarction.  No significant mass effect or evidence for hemorrhagic conversion.    Vessel Imaging:  CTA OSH. Date: 04/22/20  L ICA occlusion

## 2020-04-22 NOTE — CONSULTS
[facility]  Vascular Neurology  Comprehensive Stroke Center  Tele-Consultation Note      Consults    Consulting Provider: HAMLET HOPKINS  Current Providers  No providers found    Patient Location: [facility] ED [facility] Emergency Department  Spoke hospital nurse at bedside with patient assisting consultant.     Patient information was obtained from EMS personnel.         Assessment/Plan:     STROKE DOCUMENTATION     Acute Stroke Times:   Acute Stroke Times   Last Known Normal Date: 04/21/20  Last Known Normal Time: 1800  Stroke Team Called Date: 04/22/20  Stroke Team Called Time: 0939  Stroke Team Arrival Date: 04/22/20  Stroke Team Arrival Time: 0945  CT Interpretation Time: 0945    NIH Scale:  1a. Level of Consciousness: 0-->Alert, keenly responsive  1b. LOC Questions: 2-->Answers neither question correctly  1c. LOC Commands: 2-->Performs neither task correctly  2. Best Gaze: 0-->Normal  3. Visual: 0-->No visual loss  4. Facial Palsy: 2-->Partial paralysis (total or near-total paralysis of lower face)  5a. Motor Arm, Left: 0-->No drift, limb holds 90 (or 45) degrees for full 10 secs  5b. Motor Arm, Right: 0-->No drift, limb holds 90 (or 45) degrees for full 10 secs  6a. Motor Leg, Left: 0-->No drift, leg holds 30 degree position for full 5 secs  6b. Motor Leg, Right: 1-->Drift, leg falls by the end of the 5-sec period but does not hit bed  7. Limb Ataxia: 0-->Absent  8. Sensory: 0-->Normal, no sensory loss  9. Best Language: 3-->Mute, global aphasia, no usable speech or auditory comprehension  10. Dysarthria: 2-->Severe dysarthria, patients speech is so slurred as to be unintelligible in the absence of or out of proportion to any dysphasia, or is mute/anarthric  11. Extinction and Inattention (formerly Neglect): 0-->No abnormality  Total (NIH Stroke Scale): 12     Modified [place]    Jennifer Coma Scale:    ABCD2 Score:    [id number]  Score:   HAS -BLED Score:   ICH Score:   Hunt & Dahl Classification:       Diagnoses:   Acute ischemic left MCA stroke  80 y/o woman presenting with aphasia, slight R sided weakness.  LKN yesterday evening, so not a tPA candidate.  Recommend STAT CTA head/neck, as patient may be candidate for thrombectomy.        Antithrombotics for secondary stroke prevention: Antiplatelets: Aspirin: 325 mg daily    Statins for secondary stroke prevention and hyperlipidemia, if present:   Statins: Atorvastatin- 40 mg daily    Aggressive risk factor modification: HTN     Rehab efforts: The patient has been evaluated by a stroke team provider and the therapy needs have been fully considered based off the presenting complaints and exam findings. The following therapy evaluations are needed: PT evaluate and treat, OT evaluate and treat, SLP evaluate and treat, PM&R evaluate for appropriate placement    Diagnostics ordered/pending: CTA Head to assess vasculature , CTA Neck/Arch to assess vasculature, HgbA1C to assess blood glucose levels, Lipid Profile to assess cholesterol levels, MRI head without contrast to assess brain parenchyma, TTE to assess cardiac function/status , TSH to assess thyroid function    VTE prophylaxis: Heparin 5000 units SQ every 8 hours    BP parameters: Infarct: No intervention, SBP <220            There were no vitals taken for this visit.  Alteplase Eligible?: No  Alteplase Recommendation: Alteplase not recommended due to Outside of treatment window   Possible Interventional Revascularization Candidate? Yes --> CTA pending    Disposition Recommendation: pending further studies    Subjective:     History of Present Illness:  80 y/o woman with PMH HTN, HLD, presenting for AMS, aphasia.  Patient was found wandering outside by her neighbors this morning.  She had R facial droop and was not speaking.  LKN 1800.  Patient is Ivorian speaking and an interpretor was used for the exam.      Woke up with symptoms?:  yes    Recent bleeding noted: unknown  Does the patient take any Blood Thinners? unknown  Medications: No relevant medications      Past Medical History: hypertension and hyperlipidemia    Past Surgical History: unable to obtain    Family History: no relevant history    Social History: no smoking, no drinking, no drugs    Allergies: Allergies have not been reviewed No relevant allergies    Review of Systems   Unable to perform ROS: Patient nonverbal     Objective:   Vitals: There were no vitals taken for this visit. BP: 170/83    CT READ: Yes  No hemmorhage. No mass effect. No early infarct signs.     Physical Exam   Constitutional: No distress.   HENT:   Head: Normocephalic and atraumatic.   Eyes: Pupils are equal, round, and reactive to light. EOM are normal.   Pulmonary/Chest: Effort normal.   Neurological:   MS: Alert, no speech output, does not follow commands, no neglect  CN: PERRL, EOMI, R facial droop  Motor: no arm drift   Slight R leg drift  Sens: intact to LT  Coord: GINNY                 Recommended the emergency room physician to have a brief discussion with the patient and/or family if available regarding the risks and benefits of treatment, and to briefly document the occurrence of that discussion in his clinical encounter note.     The attending portion of this evaluation, treatment, and documentation was performed per Bisi Parker MD via audiovisual.    Billing code:  (time dependent stroke, complex case, unstable patient, hemorrhages, any intervention, some mimics)    · This patient has a very critical neurological condition/illness, with very high morbidity and mortality.  · There is a very high probability for acute neurological change leading to clinical and possibly life-threatening deterioration requiring highest level of physician preparedness for urgent intervention.  · There is possibility that this condition will require treatment with high risk medications as quickly as  possible.  · There is also a possibility that the patient may benefit from further, more advance complex therapies (e.g. endovascular therapy) that will require prompt diagnosis and care.  · Care was coordinated with other physicians involved in the patient's care.  · Radiologic studies and laboratory data were reviewed and interpreted, and plan of care was re-assessed based on the results.  · Diagnosis, treatment options and prognosis may have been discussed with the patient and/or family members or caregiver.  · Further advanced medical management and further evaluation is warranted for his care.      In your opinion, this was a: Tier 1 Van Positive    Consult End Time: 10:18 AM     Bisi Parker MD  Comprehensive Stroke Center  Vascular Neurology   [facility]

## 2020-04-22 NOTE — NURSING
Pt arrived to IR room 190 for thrombectomy. Pt oriented to unit and staff. Plan of care reviewed with patient, patient verbalizes understanding. Comfort measures utilized. Pt safely transferred from stretcher to procedural table. Fall risk reviewed with patient, fall risk interventions maintained. Safety strap applied, positioner pillows utilized to minimize pressure points. Blankets applied. Pt prepped and draped utilizing standard sterile technique. Patient placed on continuous monitoring, as required by sedation policy. Timeouts completed utilizing standard universal time-out, per department and facility policy. RN to remain at bedside, continuous monitoring maintained. Pt resting comfortably. Denies pain/discomfort. Will continue to monitor. See flow sheets for monitoring, medication administration, and updates.

## 2020-04-22 NOTE — ED PROVIDER NOTES
Encounter Date: 4/22/2020       History     Chief Complaint   Patient presents with    Transfer     Trasfer from [facility], Left MCA.       80 yo F presents from OSH with reported CVA.  Transferred for IR.  Patient arrived in the department and was immediately greeted by the stroke team.  Taken for noncontrast head CT.  From head CT was immediately taken to IR.  The patient was never physically placed in ED bed.  Because of this I never had the opportunity to physically see the patient.        Review of patient's allergies indicates:  No Known Allergies  No past medical history on file.  No past surgical history on file.  No family history on file.  Social History     Tobacco Use    Smoking status: Not on file   Substance Use Topics    Alcohol use: Not on file    Drug use: Not on file     Review of Systems   Unable to perform ROS: Other       Physical Exam     Initial Vitals [04/22/20 1418]   BP Pulse Resp Temp SpO2   (!) 202/93 74 18 -- 99 %      MAP       --         Physical Exam  Unable to perform  ED Course   Procedures  Labs Reviewed   SARS-COV-2 RNA AMPLIFICATION, QUAL          Imaging Results          CT Head Without Contrast (In process)  Result time 04/22/20 14:27:34             Both corona virus and head CT results pending when the patient went upstairs.     Medical Decision Making:   History:   Old Medical Records: I decided to obtain old medical records.  Clinical Tests:   Lab Tests: Ordered  Radiological Study: Ordered  ED Management:  79-year-old female with acute CVA.  Vitals with hypertension.  Taken to IR from the CT scanner.  Care assumed by stroke team.  Unable to do a face-to-face encounter with the patient per HPI.  Other:   I have discussed this case with another health care provider.       <> Summary of the Discussion: Stroke                                 Clinical Impression:   Stroke      Disposition:   Disposition: Admitted  Condition: Serious                        Brijesh Pan  MD  04/22/20 3513

## 2020-04-22 NOTE — PROCEDURES
Neurointerventional Radiology Post-Procedure Note    Pre Op Diagnosis: Left ICA oclcusion    Post Op Diagnosis: Left ICA occlusion s/p angioplasty and aspiration thrombectomy    Procedure: Cerebral angiogram    Procedure performed by: Bala WATERS, Onur Dominguez MD.    Written Informed Consent Obtained: Yes    Specimen Removed: NO    Estimated Blood Loss: 200     Procedure report:     A 8F sheath was placed into the right femoral artery and a 5F Gianni catheter was advanced into the aortic arch.  The Left ICA arteries were subselected and angiography of the brain was performed after injection into each of these vessels.    Preliminary interpretation: Left ICA occlusion was treated using Avaitor Balloon angioplasty. Left terminal ICA occlusion was reperfused with TICI 2A score on there left MCA using aspiration catheter.  Please see Imaging report for full details.    A right femoral artery angiogram was performed, the sheath removed and hemostasis achieved using manual pressure.  No hematoma was present at the time of hemostasis.-    The patient tolerated the procedure well.     Harpreet Dominguez MD  NeuroInterventional Radiology Fellow

## 2020-04-22 NOTE — ASSESSMENT & PLAN NOTE
Stroke risk factor  Lipid panel pending  Home medications unknown  Recommend starting atorvastatin 40 mg daily

## 2020-04-22 NOTE — SUBJECTIVE & OBJECTIVE
Woke up with symptoms?: yes    Recent bleeding noted: unknown  Does the patient take any Blood Thinners? unknown  Medications: No relevant medications      Past Medical History: hypertension and hyperlipidemia    Past Surgical History: unable to obtain    Family History: no relevant history    Social History: no smoking, no drinking, no drugs    Allergies: Allergies have not been reviewed No relevant allergies    Review of Systems   Unable to perform ROS: Patient nonverbal     Objective:   Vitals: There were no vitals taken for this visit. BP: 170/83    CT READ: Yes  No hemmorhage. No mass effect. No early infarct signs.     Physical Exam   Constitutional: No distress.   HENT:   Head: Normocephalic and atraumatic.   Eyes: Pupils are equal, round, and reactive to light. EOM are normal.   Pulmonary/Chest: Effort normal.   Neurological:   MS: Alert, no speech output, does not follow commands, no neglect  CN: PERRL, EOMI, R facial droop  Motor: no arm drift   Slight R leg drift  Sens: intact to LT  Coord: GINNY

## 2020-04-22 NOTE — HPI
80 y/o woman with PMH HTN, HLD, presenting for AMS, aphasia.  Patient was found wandering outside by her neighbors this morning.  She had R facial droop and was not speaking.  LKN 1800.  Patient is Frisian speaking and an interpretor was used for the exam.

## 2020-04-22 NOTE — HPI
Teresa Vaughan is a 79 y.o. female with PMHx of HTN and HLD who presented to [facility] with aphasia and RSW. Patient went to ED after being found outside wandering around by her neighbors. She was seen via telemedicine and was not a candidate for tPA (last known normal yesterday). CTA at OSH revealed L ICA occlusion. Patient transferred to [facility] for possible IR intervention. Patient primarily Mohawk speaking and aphasic. Therefore, history obtained via chart review.

## 2020-04-22 NOTE — ASSESSMENT & PLAN NOTE
Teresa Vaughan is a 79 y.o. female with PMHx of HTN and HLD who presented to OSH with RSW and aphasia. She was seen via telemedicine and not eligible for tPA (last known normal yesterday). CTA obtained at OSH with L ICA occlusion and patient transferred to [facility] for possible IR intervention. CT head on arrival to [facility] with ischemic changes in L insula. Patient going to IR for possible thrombectomy. Etiology unclear at this time, differential includes KIM vs ESUS.      Antithrombotics for secondary stroke prevention: Antiplatelets: Aspirin: 81 mg daily    Statins for secondary stroke prevention and hyperlipidemia, if present:   Statins: Atorvastatin- 40 mg daily    Aggressive risk factor modification: HTN, HLD     Rehab efforts: The patient has been evaluated by a stroke team provider and the therapy needs have been fully considered based off the presenting complaints and exam findings. The following therapy evaluations are needed: PT evaluate and treat, OT evaluate and treat, SLP evaluate and treat, PM&R evaluate for appropriate placement    Diagnostics ordered/pending: HgbA1C to assess blood glucose levels, Lipid Profile to assess cholesterol levels, MRI head without contrast to assess brain parenchyma, TTE to assess cardiac function/status , TSH to assess thyroid function    VTE prophylaxis: Heparin 5000 units SQ every 8 hours    BP parameters: Infarct: Post sucessful thrombectomy, SBP <140  Post unsucessful thrombectomy, SBP <220

## 2020-04-22 NOTE — ASSESSMENT & PLAN NOTE
78 y/o woman presenting with aphasia, slight R sided weakness.  LKN yesterday evening, so not a tPA candidate.  Recommend STAT CTA head/neck, as patient may be candidate for thrombectomy.        Antithrombotics for secondary stroke prevention: Antiplatelets: Aspirin: 325 mg daily    Statins for secondary stroke prevention and hyperlipidemia, if present:   Statins: Atorvastatin- 40 mg daily    Aggressive risk factor modification: HTN     Rehab efforts: The patient has been evaluated by a stroke team provider and the therapy needs have been fully considered based off the presenting complaints and exam findings. The following therapy evaluations are needed: PT evaluate and treat, OT evaluate and treat, SLP evaluate and treat, PM&R evaluate for appropriate placement    Diagnostics ordered/pending: CTA Head to assess vasculature , CTA Neck/Arch to assess vasculature, HgbA1C to assess blood glucose levels, Lipid Profile to assess cholesterol levels, MRI head without contrast to assess brain parenchyma, TTE to assess cardiac function/status , TSH to assess thyroid function    VTE prophylaxis: Heparin 5000 units SQ every 8 hours    BP parameters: Infarct: No intervention, SBP <220

## 2020-04-22 NOTE — ASSESSMENT & PLAN NOTE
Area of cytotoxic cerebral edema identified when reviewing brain imaging in the territory of the L internal carotid artery. There is no mass effect associated with it. We will continue to monitor the patients clinical exam for any worsening of symptoms which may indicate expansion of the stroke or the area of the edema resulting in the clinical change. The pattern is suggestive of KIM vs ESUS etiology.

## 2020-04-23 LAB
ALBUMIN SERPL BCP-MCNC: 3.6 G/DL (ref 3.5–5.2)
ALP SERPL-CCNC: 73 U/L (ref 55–135)
ALT SERPL W/O P-5'-P-CCNC: 14 U/L (ref 10–44)
ANION GAP SERPL CALC-SCNC: 10 MMOL/L (ref 8–16)
ASCENDING AORTA: 2.41 CM
AST SERPL-CCNC: 22 U/L (ref 10–40)
AV INDEX (PROSTH): 0.65
AV MEAN GRADIENT: 7 MMHG
AV PEAK GRADIENT: 14 MMHG
AV VALVE AREA: 1.97 CM2
AV VELOCITY RATIO: 0.59
BASOPHILS # BLD AUTO: 0.04 K/UL (ref 0–0.2)
BASOPHILS NFR BLD: 0.4 % (ref 0–1.9)
BILIRUB SERPL-MCNC: 0.6 MG/DL (ref 0.1–1)
BILIRUB UR QL STRIP: NEGATIVE
BSA FOR ECHO PROCEDURE: 1.43 M2
BUN SERPL-MCNC: 9 MG/DL (ref 8–23)
CALCIUM SERPL-MCNC: 8.5 MG/DL (ref 8.7–10.5)
CHLORIDE SERPL-SCNC: 107 MMOL/L (ref 95–110)
CLARITY UR REFRACT.AUTO: CLEAR
CO2 SERPL-SCNC: 22 MMOL/L (ref 23–29)
COLOR UR AUTO: ABNORMAL
CREAT SERPL-MCNC: 0.7 MG/DL (ref 0.5–1.4)
CV ECHO LV RWT: 0.36 CM
DIFFERENTIAL METHOD: ABNORMAL
DOP CALC AO PEAK VEL: 1.84 M/S
DOP CALC AO VTI: 39.47 CM
DOP CALC LVOT AREA: 3 CM2
DOP CALC LVOT DIAMETER: 1.96 CM
DOP CALC LVOT PEAK VEL: 1.09 M/S
DOP CALC LVOT STROKE VOLUME: 77.92 CM3
DOP CALCLVOT PEAK VEL VTI: 25.84 CM
E WAVE DECELERATION TIME: 260.12 MSEC
E/A RATIO: 0.77
E/E' RATIO: 10.57 M/S
ECHO LV POSTERIOR WALL: 0.78 CM (ref 0.6–1.1)
EOSINOPHIL # BLD AUTO: 0 K/UL (ref 0–0.5)
EOSINOPHIL NFR BLD: 0.4 % (ref 0–8)
ERYTHROCYTE [DISTWIDTH] IN BLOOD BY AUTOMATED COUNT: 13.5 % (ref 11.5–14.5)
EST. GFR  (AFRICAN AMERICAN): >60 ML/MIN/1.73 M^2
EST. GFR  (NON AFRICAN AMERICAN): >60 ML/MIN/1.73 M^2
FRACTIONAL SHORTENING: 38 % (ref 28–44)
GLUCOSE SERPL-MCNC: 101 MG/DL (ref 70–110)
GLUCOSE UR QL STRIP: NEGATIVE
HCT VFR BLD AUTO: 35.6 % (ref 37–48.5)
HGB BLD-MCNC: 11.4 G/DL (ref 12–16)
HGB UR QL STRIP: ABNORMAL
IMM GRANULOCYTES # BLD AUTO: 0.02 K/UL (ref 0–0.04)
IMM GRANULOCYTES NFR BLD AUTO: 0.2 % (ref 0–0.5)
INTERVENTRICULAR SEPTUM: 0.69 CM (ref 0.6–1.1)
IVRT: 111.32 MSEC
KETONES UR QL STRIP: ABNORMAL
LA MAJOR: 5.59 CM
LA MINOR: 5.08 CM
LA WIDTH: 3.59 CM
LEFT ATRIUM SIZE: 3.32 CM
LEFT ATRIUM VOLUME INDEX: 37.7 ML/M2
LEFT ATRIUM VOLUME: 53.93 CM3
LEFT INTERNAL DIMENSION IN SYSTOLE: 2.68 CM (ref 2.1–4)
LEFT VENTRICLE DIASTOLIC VOLUME INDEX: 58.15 ML/M2
LEFT VENTRICLE DIASTOLIC VOLUME: 83.21 ML
LEFT VENTRICLE MASS INDEX: 66 G/M2
LEFT VENTRICLE SYSTOLIC VOLUME INDEX: 18.6 ML/M2
LEFT VENTRICLE SYSTOLIC VOLUME: 26.58 ML
LEFT VENTRICULAR INTERNAL DIMENSION IN DIASTOLE: 4.3 CM (ref 3.5–6)
LEFT VENTRICULAR MASS: 94.28 G
LEUKOCYTE ESTERASE UR QL STRIP: NEGATIVE
LV LATERAL E/E' RATIO: 12.33 M/S
LV SEPTAL E/E' RATIO: 9.25 M/S
LYMPHOCYTES # BLD AUTO: 1.6 K/UL (ref 1–4.8)
LYMPHOCYTES NFR BLD: 15 % (ref 18–48)
MAGNESIUM SERPL-MCNC: 2 MG/DL (ref 1.6–2.6)
MCH RBC QN AUTO: 28.6 PG (ref 27–31)
MCHC RBC AUTO-ENTMCNC: 32 G/DL (ref 32–36)
MCV RBC AUTO: 89 FL (ref 82–98)
MICROSCOPIC COMMENT: NORMAL
MONOCYTES # BLD AUTO: 1 K/UL (ref 0.3–1)
MONOCYTES NFR BLD: 9 % (ref 4–15)
MV PEAK A VEL: 0.96 M/S
MV PEAK E VEL: 0.74 M/S
NEUTROPHILS # BLD AUTO: 8 K/UL (ref 1.8–7.7)
NEUTROPHILS NFR BLD: 75 % (ref 38–73)
NITRITE UR QL STRIP: NEGATIVE
NRBC BLD-RTO: 0 /100 WBC
PH UR STRIP: 5 [PH] (ref 5–8)
PHOSPHATE SERPL-MCNC: 3.7 MG/DL (ref 2.7–4.5)
PISA TR MAX VEL: 2.7 M/S
PLATELET # BLD AUTO: 213 K/UL (ref 150–350)
PMV BLD AUTO: 11.1 FL (ref 9.2–12.9)
POCT GLUCOSE: 100 MG/DL (ref 70–110)
POCT GLUCOSE: 79 MG/DL (ref 70–110)
POCT GLUCOSE: 86 MG/DL (ref 70–110)
POCT GLUCOSE: 92 MG/DL (ref 70–110)
POTASSIUM SERPL-SCNC: 3.6 MMOL/L (ref 3.5–5.1)
POTASSIUM SERPL-SCNC: 4.4 MMOL/L (ref 3.5–5.1)
PROT SERPL-MCNC: 6.6 G/DL (ref 6–8.4)
PROT UR QL STRIP: NEGATIVE
PULM VEIN S/D RATIO: 1.96
PV PEAK D VEL: 0.25 M/S
PV PEAK S VEL: 0.49 M/S
RA MAJOR: 4.7 CM
RA PRESSURE: 3 MMHG
RA WIDTH: 2.8 CM
RBC # BLD AUTO: 3.99 M/UL (ref 4–5.4)
RBC #/AREA URNS AUTO: 1 /HPF (ref 0–4)
RIGHT VENTRICULAR END-DIASTOLIC DIMENSION: 3.03 CM
RV TISSUE DOPPLER FREE WALL SYSTOLIC VELOCITY 1 (APICAL 4 CHAMBER VIEW): 18.05 CM/S
SINUS: 2.75 CM
SODIUM SERPL-SCNC: 139 MMOL/L (ref 136–145)
SP GR UR STRIP: >=1.03 (ref 1–1.03)
STJ: 2.18 CM
TDI LATERAL: 0.06 M/S
TDI SEPTAL: 0.08 M/S
TDI: 0.07 M/S
TR MAX PG: 29 MMHG
TRICUSPID ANNULAR PLANE SYSTOLIC EXCURSION: 2.02 CM
TV REST PULMONARY ARTERY PRESSURE: 32 MMHG
URN SPEC COLLECT METH UR: ABNORMAL
WBC # BLD AUTO: 10.7 K/UL (ref 3.9–12.7)

## 2020-04-23 PROCEDURE — 94761 N-INVAS EAR/PLS OXIMETRY MLT: CPT

## 2020-04-23 PROCEDURE — 99291 CRITICAL CARE FIRST HOUR: CPT | Mod: GC,,, | Performed by: PSYCHIATRY & NEUROLOGY

## 2020-04-23 PROCEDURE — 85025 COMPLETE CBC W/AUTO DIFF WBC: CPT

## 2020-04-23 PROCEDURE — 83735 ASSAY OF MAGNESIUM: CPT

## 2020-04-23 PROCEDURE — 20000000 HC ICU ROOM

## 2020-04-23 PROCEDURE — 25000003 PHARM REV CODE 250: Performed by: INTERNAL MEDICINE

## 2020-04-23 PROCEDURE — 92610 EVALUATE SWALLOWING FUNCTION: CPT

## 2020-04-23 PROCEDURE — 99233 PR SUBSEQUENT HOSPITAL CARE,LEVL III: ICD-10-PCS | Mod: GC,,, | Performed by: PSYCHIATRY & NEUROLOGY

## 2020-04-23 PROCEDURE — 84132 ASSAY OF SERUM POTASSIUM: CPT

## 2020-04-23 PROCEDURE — 92523 SPEECH SOUND LANG COMPREHEN: CPT

## 2020-04-23 PROCEDURE — 81001 URINALYSIS AUTO W/SCOPE: CPT

## 2020-04-23 PROCEDURE — 97535 SELF CARE MNGMENT TRAINING: CPT

## 2020-04-23 PROCEDURE — 80053 COMPREHEN METABOLIC PANEL: CPT

## 2020-04-23 PROCEDURE — 99291 PR CRITICAL CARE, E/M 30-74 MINUTES: ICD-10-PCS | Mod: GC,,, | Performed by: PSYCHIATRY & NEUROLOGY

## 2020-04-23 PROCEDURE — 63600175 PHARM REV CODE 636 W HCPCS: Performed by: INTERNAL MEDICINE

## 2020-04-23 PROCEDURE — 97165 OT EVAL LOW COMPLEX 30 MIN: CPT

## 2020-04-23 PROCEDURE — 99233 SBSQ HOSP IP/OBS HIGH 50: CPT | Mod: GC,,, | Performed by: PSYCHIATRY & NEUROLOGY

## 2020-04-23 PROCEDURE — 97162 PT EVAL MOD COMPLEX 30 MIN: CPT

## 2020-04-23 PROCEDURE — 97112 NEUROMUSCULAR REEDUCATION: CPT

## 2020-04-23 PROCEDURE — 27000221 HC OXYGEN, UP TO 24 HOURS

## 2020-04-23 PROCEDURE — 84100 ASSAY OF PHOSPHORUS: CPT

## 2020-04-23 RX ORDER — HEPARIN SODIUM 5000 [USP'U]/ML
5000 INJECTION, SOLUTION INTRAVENOUS; SUBCUTANEOUS EVERY 8 HOURS
Status: DISCONTINUED | OUTPATIENT
Start: 2020-04-23 | End: 2020-04-30 | Stop reason: HOSPADM

## 2020-04-23 RX ADMIN — STANDARDIZED SENNA CONCENTRATE AND DOCUSATE SODIUM 1 TABLET: 8.6; 5 TABLET ORAL at 09:04

## 2020-04-23 RX ADMIN — HEPARIN SODIUM 5000 UNITS: 5000 INJECTION INTRAVENOUS; SUBCUTANEOUS at 02:04

## 2020-04-23 RX ADMIN — HEPARIN SODIUM 5000 UNITS: 5000 INJECTION INTRAVENOUS; SUBCUTANEOUS at 09:04

## 2020-04-23 RX ADMIN — POTASSIUM CHLORIDE 40 MEQ: 7.46 INJECTION, SOLUTION INTRAVENOUS at 06:04

## 2020-04-23 RX ADMIN — CLOPIDOGREL BISULFATE 75 MG: 75 TABLET ORAL at 10:04

## 2020-04-23 RX ADMIN — STANDARDIZED SENNA CONCENTRATE AND DOCUSATE SODIUM 1 TABLET: 8.6; 5 TABLET ORAL at 10:04

## 2020-04-23 RX ADMIN — ATORVASTATIN CALCIUM 40 MG: 20 TABLET, FILM COATED ORAL at 10:04

## 2020-04-23 RX ADMIN — ASPIRIN 81 MG: 81 TABLET, COATED ORAL at 10:04

## 2020-04-23 RX ADMIN — POLYETHYLENE GLYCOL 3350 17 G: 17 POWDER, FOR SOLUTION ORAL at 10:04

## 2020-04-23 NOTE — PLAN OF CARE
Problem: Physical Therapy Goal  Goal: Physical Therapy Goal  Description  Goals to be met by: 5/3     Patient will increase functional independence with mobility by performin. Supine to sit with MInimal Assistance  2. Sit to supine with MInimal Assistance  3. Sit to stand transfer with Moderate Assistance  4. Bed to chair transfer with Moderate Assistance using squat pivot technique  5. Gait  x 5 feet with Maximum Assistance using least restrictive device or no AD.    6. Sitting at edge of bed x10 minutes with Stand-by Assistance while performing a dynamic reaching task with no UE support on bed to prepare for functional activities in sitting  7. Lower extremity exercise program x15 reps per handout, with assistance as needed to improve strength and neuromuscular control to increase independence with mobility.     Outcome: Ongoing, Progressing   Evaluation completed, initiated plan of care.   Jane Montero, PT  2020

## 2020-04-23 NOTE — PLAN OF CARE
POC reviewed with pt. Pt unable to verbalize understanding d/t global aphasia. Pt progressing towards goals. Barium swallow scheduled for tomorrow. No acute events. Will continue to monitor.

## 2020-04-23 NOTE — PLAN OF CARE
Problem: Occupational Therapy Goal  Goal: Occupational Therapy Goal  Description  Goals set on 4/23 with expiration date 5/8:  Patient will increase functional independence with ADLs by performing:    Supine <> Sit with Min Assistance.  Feeding with Min  Assistance.  Grooming while standing at sink with Min  Assistance.  UB Dressing with Min  Assistance.  LB Dressing with Min Assistance.  Step transfer with Min Assistance with DME as needed.  Pt will demonstrate understanding of education provided regarding energy conservation and task modification through teach-back method.   Patient will increase functional independence with ADLs by performing:  Patient will demonstrate assistance as needed with HEP for R UE weight bearing.      Patient's family / caregiver will demonstrate assistance as needed and safety with assisting patient with self-care skills and functional mobility.      Patient's family / caregiver will demonstrate assistance as needed with HEP, A/AAROM and changes in bed positioning.     Outcome: Ongoing, Progressing      Eval complete. Pt tolerated session well. Initiate OT POC.  CHANDRA Siddiqi    04/23/2020

## 2020-04-23 NOTE — PT/OT/SLP EVAL
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  She presents with the following performance deficits affecting function: weakness, impaired endurance, impaired sensation, impaired self care skills, impaired functional mobilty, gait instability, impaired balance, visual deficits, impaired cognition, decreased coordination, decreased upper extremity function, decreased lower extremity function, decreased safety awareness, abnormal tone, decreased ROM, impaired coordination, impaired fine motor, impaired skin, impaired cardiopulmonary response to activity.      Rehab Prognosis: Good; patient would benefit from acute skilled OT services to address these deficits and reach maximum level of function.       Subjective   Communicated with: RN prior to session.  Patient found HOB elevated with bed alarm, blood pressure cuff, pressure relief boots, peripheral IV, pulse ox (continuous), SCD, telemetry, PureWick, arterial line upon OT entry to room.    Chief Complaint: Transfer (Transfer from [facility], Left MCA.  )    Patient/Family Comments/goals: GINNY patient globally aphasic, RSW and flexor tone noted,  maximize return to  PLOF    Pain/Comfort:  · Pain Rating 1: (globally aphasic, no pain noted)  · Pain Addressed 1: Reposition, Distraction  · Pain Rating Post-Intervention 1: 0/10    Patients cultural, spiritual, Sabianism conflicts given the current situation: other (see comments)(Czech speaking)    Occupational Profile:  Patient unable to report due to aphasia. Per chart review, patient lives alone in a SSH, ramp to enter.  , family lives in [place].   Prior to admission, patients level of function was independent did not use AD.  Equipment used at home: none.  DME owned (not currently used): none.      Upon discharge, patient will have minimal support, family lives in [place].      Objective:     Patient found with: bed alarm, blood pressure cuff, pressure relief boots, peripheral IV, pulse ox (continuous), SCD, telemetry, PureWick, arterial line   General Precautions: Standard, Cardiac aphasia, aspiration, fall   Orthopedic Precautions:N/A   Braces: N/A   Oxygen Device: Room Air  Vitals: BP (!) 140/60 (BP Location: Right arm, Patient Position: Lying)   Pulse 69   Temp 99 °F (37.2 °C) (Oral)   Resp 16   Ht 5' (1.524 m)   Wt 48.5 kg (107 lb)   SpO2 96%   Breastfeeding? No   BMI 20.90 kg/m²     Cognitive and Psychosocial Function:   · AxOx0 -- globally aphasic, GINNY   · Follows Commands/attention:Inattentive  · Communication: no verbalizations  · Memory: GINNY  · Safety awareness/insight to disability: impaired and GINNY   · Mood/Affect/Coping skills/emotional control: GINNY 2* aphasia    Hearing: GINNY 2* aphasia    Vision:  Right inattention and Right neglect    Physical Exam:  Postural examination/scapula alignment:    -       Rounded shoulders  -       Forward head  -       Posterior pelvic tilt  -       Kyphosis  -       posterior, Rward lean in seated  Skin integrity: Visible skin intact     Left UE Right UE   UE Edema None noted None noted   UE ROM AAROM WFL ROM limited by flexor tone present   Modified  [place] Scale wnl 3: Considerable increase in muscle tone, passive movement difficult   UE Strength 4/5 2/5    Strength WNL GINNY   Sensation Intact Impaired:  Light/touch, proprioception, R inattention   Fine Motor Coordination:  -       Intact -       Impaired      Gross Motor Coordination: WFL R hemiplegia/paresis     Occupational Performance:  Bed Mobility:    · Patient completed Rolling/Turning to Right with maximal assistance  · Scooting to HOB in supine: maximal assistance and 2 persons  · Patient completed Supine to Sit with moderate assistance on L side of bed  · Patient completed Sit to Supine with maximal assistance and 2 persons on L side of bed  · Scooting anteriorly to EOB to have both feet planted on floor: moderate assistance    Functional Mobility/Transfers:  - Patient completed Sit <> Stand Transfer with maximal assistance  with  hand-held assist   - Static Sitting EOB: Min-Mod A, posterior and Rward lean  - Dynamic Sitting EOB: Mod-Max A    Activities of Daily Living:  · Grooming: maximal assistance 2* patient confusion, aphasia  · Lower Body Dressing: total assistance 2* confusion and aphasia      AMPAC 6 Click ADL:  AMPAC Total Score: 15    Treatment & Education:  -Pt education on OT role and POC, needs reinforcement   -Safety during functional transfer and mobility  -White board updated  -Multiple self-care tasks and functional mobility completed -- assistance level noted above  -All questions and concerns answered within OT scope of practice.     Patient left with bed in chair position with all lines intact, call button in reach, RN notified and RN present    GOALS:   Multidisciplinary Problems     Occupational Therapy Goals        Problem: Occupational Therapy Goal    Goal Priority Disciplines Outcome Interventions   Occupational Therapy Goal     OT, PT/OT Ongoing, Progressing    Description:  Goals set on 4/23 with expiration date 5/8:  Patient will increase functional independence with  ADLs by performing:    Supine <> Sit with Min Assistance.  Feeding with Min  Assistance.  Grooming while standing at sink with Min  Assistance.  UB Dressing with Min  Assistance.  LB Dressing with Min Assistance.  Step transfer with Min Assistance with DME as needed.  Pt will demonstrate understanding of education provided regarding energy conservation and task modification through teach-back method.   Patient will increase functional independence with ADLs by performing:  Patient will demonstrate assistance as needed with HEP for R UE weight bearing.      Patient's family / caregiver will demonstrate assistance as needed and safety with assisting patient with self-care skills and functional mobility.      Patient's family / caregiver will demonstrate assistance as needed with HEP, A/AAROM and changes in bed positioning.                               History:     History reviewed. No pertinent past medical history.    History reviewed. No pertinent surgical history.    Time Tracking:       OT Date of Treatment: 04/23/20  OT Start Time: 1448  OT Stop Time: 1529  OT Total Time (min): 41 min  Additional staff present: PT    NOTE:  line used    Billable Minutes:Evaluation 15  Self Care/Home Management 26      CHANDRA Siddiqi  4/23/2020

## 2020-04-23 NOTE — ASSESSMENT & PLAN NOTE
Teresa Vaughan is a 79 y.o. female with PMHx of HTN and HLD who presented to OSH with RSW and aphasia. She was seen via telemedicine and not eligible for tPA (last known normal yesterday). CTA obtained at OSH with L ICA occlusion and patient transferred to [facility] for possible IR intervention. CT head on arrival to [facility] with ischemic changes in L insula. Patient went to IR for angioplasty and aspiration thrombectomy. MRI demonstrated involvment of L MCA, L PCA and L cerebellar aa with a R cerebellar finding as well. Etiology most likely embolic in nature v possible KIM. TTE demonstrated EF 70% with no other significant findings. Patient has already tested negative for COVID    Likely embolic in nature    Recommend obtaining CTA due to involvement of several aa, thereby possibly implicating vasculitis  Patient would likely benefit from an event monitor    Antithrombotics for secondary stroke prevention: Antiplatelets: Aspirin: 81 mg daily, Plavix 75    Statins for secondary stroke prevention and hyperlipidemia, if present:   Statins: Atorvastatin- 40 mg daily    Aggressive risk factor modification: HTN, HLD     Rehab efforts: The patient has been evaluated by a stroke team provider and the therapy needs have been fully considered based off the presenting complaints and exam findings. The following therapy evaluations are needed: PT evaluate and treat, OT evaluate and treat, SLP evaluate and treat, PM&R evaluate for appropriate placement    Diagnostics ordered/pending: Recommend obtaining CTA    VTE prophylaxis: Heparin 5000 units SQ every 8 hours    BP parameters: Infarct: Post sucessful thrombectomy, SBP <140

## 2020-04-23 NOTE — ASSESSMENT & PLAN NOTE
Teresa Taylor is a 79 year old female who was LKN on 04/21/20 found wandering around outside by neighbors today 04/22/20, taken to the ED presenting with RSW and aphasia, CTA at OSH revealed L ICA occlusion and patient was transferred to [facility] for thrombectomy. Patient admitted to [facility] s/p acute intervention. Patient is primarily Ecuadorean speaking and aphasic with no family present, Therefore all history was obtained via chart review. Etiology of stroke unclear at this time. Likely embolic, differential includes KIM vs ESUS.    Neuro:      Embolic stroke involving left Internal Carotid Artery  -S/p thrombectomy, 4/22 TICI 2A  -neuro checks and vital signs q30 min x 6hrs post procedure, then q1hr x 16hrs  -aspirin 325 and statin 40mg daily   -Plavix 75mg daily  -MRI first tomorrow then start DAPT        Pulmonary:      Supportive oxygenation  -monitor with continuous pulse oximetry  -titrate supplemental 02 to achieve sats > 92%       Cardiac:      Hypertension  -if needed, start nicardipene gtt @5mg/hr   -goal SBP<160        Renal:       Fluid balance   -strict I/Os        ID:      COVID negative, afebrile, no Leukocytosis     Hem/Onc:      Monitor H/H closely    -aspirin 81mg daily,   -Plavix 75mg daily     Endocrine:     Follow A1C results  Glucose control  -POCT q6hrs      Fluids/Electrolytes/Nutrition/GI:      -NPO until speech evaluation   -NG tube for meds

## 2020-04-23 NOTE — CONSULTS
[facility]  Adult Nutrition  Consult Note    SUMMARY     Recommendations    1.) Continue textured modified diet per SLP.   2.) Add Boost Pudding.   3.) Suggest MVI.   4.) Daily weights    Goals: 1.) Pt to consume/tolerate >75% of meals by follow up.   Nutrition Goal Status: new  Communication of RD Recs: other (comment)(POC)    Reason for Assessment    Reason For Assessment: consult  Diagnosis: stroke/CVA  Relevant Medical History: HTN, HLD  Interdisciplinary Rounds: did not attend  General Information Comments: Remote access: Pt Azeri speaking and unable to communicate to obtain information. Per chart, diet advanced this morning per SLP recommendations. Pt has yet to consume any meals at this time. No GI distress. No wt hx per chart. At this time, pt at risk for malnutrition. Due to recent hospital wide restrictions to limit the transfer of (COVID-19), we are not performing any physical exams at this time. All S/S will be observational; NFPE to be performed at a future date.    Nutrition Discharge Planning: Adequate intake to meet nutritional needs.     Nutrition Risk Screen    Nutrition Risk Screen: dysphagia or difficulty swallowing    Nutrition/Diet History    Food Allergies: NKFA  Factors Affecting Nutritional Intake: difficulty/impaired swallowing    Anthropometrics    Temp: 99.2 °F (37.3 °C)  Height: 5' (152.4 cm)  Height (inches): 60 in  Weight Method: Bed Scale  Weight: 48.5 kg (107 lb)  Weight (lb): 107 lb  Ideal Body Weight (IBW), Female: 100 lb  % Ideal Body Weight, Female (lb): 107 %  BMI (Calculated): 20.9  BMI Grade: 18.5-24.9 - normal       Lab/Procedures/Meds    Pertinent Labs Reviewed: reviewed  Pertinent Labs Comments: HbA1C 5.6, CO2 22, Ca 8.5  Pertinent Medications Reviewed: reviewed  Pertinent Medications Comments: aspirin, statin, famotidine, zofran, miralax, senna-docusate      Estimated/Assessed Needs    Weight Used For Calorie Calculations: 48.5 kg (106 lb 14.8  oz)  Energy Calorie Requirements (kcal): 1103  Energy Need Method: [place]-St Jeor(x 1.25 PAL)  Protein Requirements: 58-68(g/day)  Weight Used For Protein Calculations: 48.5 kg (106 lb 14.8 oz)(1.2-1.4 g/kg)     Estimated Fluid Requirement Method: RDA Method(or per MD)  RDA Method (mL): 1103         Nutrition Prescription Ordered    Current Diet Order: puree  Nutrition Order Comments: nectar thickened liquids    Evaluation of Received Nutrient/Fluid Intake    I/O: -0.19L since admit  Comments: LBM 4/21  % Intake of Estimated Energy Needs: 0 - 25 %  % Meal Intake: 0 - 25 %    Nutrition Risk    Level of Risk/Frequency of Follow-up: moderate(x1/week)     Assessment and Plan  Nutrition Problem  Swallowing/Chewing difficulty    Related to (etiology):   Stroke    Signs and Symptoms (as evidenced by):   Need for puree, nectar thickened liquids    Interventions(treatment strategy):  Collaboration of care with providers  Referral of care  Modified diet-puree, nectar thick    Nutrition Diagnosis Status:   New       Monitor and Evaluation    Food and Nutrient Intake: energy intake, food and beverage intake  Food and Nutrient Adminstration: diet order  Knowledge/Beliefs/Attitudes: food and nutrition knowledge/skill  Physical Activity and Function: nutrition-related ADLs and IADLs  Anthropometric Measurements: weight, weight change, body mass index  Biochemical Data, Medical Tests and Procedures: electrolyte and renal panel, gastrointestinal profile, glucose/endocrine profile  Nutrition-Focused Physical Findings: overall appearance     Malnutrition Assessment  Due to recent hospital wide restrictions to limit the transfer of (COVID-19), we are not performing any physical exams at this time. All S/S will be observational; NFPE to be performed at a future date.    Nutrition Follow-Up    RD Follow-up?: Yes

## 2020-04-23 NOTE — PT/OT/SLP EVAL
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    Recommendations:                  General Recommendations:  Dysphagia therapy, Speech/language therapy, Cognitive-linguistic therapy and Modified barium swallow study  Diet recommendations:  Puree, Nectar Thick   Aspiration Precautions: 1 bite/sip at a time, Alternating bites/sips, Assistance with meals and Assistance with thickening liquids, Check for pocketing/oral residue, Eliminate distractions, Feed only when awake/alert, HOB to 90 degrees, No straws, Small bites/sips and Strict aspiration precautions   General Precautions: Standard, aphasia, aspiration, fall  Communication strategies:      History:     History reviewed. No pertinent past medical history.    History reviewed. No pertinent surgical history.    Social History: Patient unable to state.    Subjective     Evaluation completed with language line  #[id number]   denied any true words elicited, jargon/randomized sounds only.     Pain/Comfort:  · Pain Rating 1: 0/10  · Pain Rating Post-Intervention 1: 0/10    Objective:     Cognitive Status:    Unable to assess      Receptive Language:   Comprehension:   impaored, 0% accy with simple commands and y/n questions despite max cues    Pragmatics:    Abnormal affect flat    Expressive Language:  Verbal:    0% accy with vowel reptition, auto speech.  No true words per        Motor Speech:  Apraxia suspected, groping observed    Voice:   clear, low intensity    Visual-Spatial:  R inattn    Reading:   tba     Written Expression:   tba    Oral Musculature Evaluation  · Oral Musculature: unable to assess due to poor participation/comprehension(R facial droop)  · Dentition: present and adequate  · Secretion Management: adequate  · Oral Labial Strength and Mobility: impaired  seal  · Buccal Strength and Mobility: decreased tone  · Volitional Cough: not elicited  · Volitional Swallow: not elicited  · Voice Prior to PO Intake: low intensity, clear    Bedside Swallow Eval:   Consistencies Assessed:  · Thin liquids tsp x2, cup x2  · Nectar thick liquids cup x6  · Puree tsp x5     Oral Phase:   · Anterior loss  · Decreased closure around utensil  · Oral residue mild with puree    Pharyngeal Phase:   · no overt clinical signs/symptoms of aspiration  · throat clearing with thin  · Audible multiple swallows with liquids    Compensatory Strategies  · None    Treatment: Education provided re: role of SLP diet recs, swallow precs and POC though pt unable to verbalize understanding.     Assessment:     Teresa Vaughan is a 79 y.o. female with an SLP diagnosis of Aphasia, Dysphagia and Apraxia.      Goals:   Multidisciplinary Problems     SLP Goals        Problem: SLP Goal    Goal Priority Disciplines Outcome   SLP Goal     SLP Ongoing, Progressing   Description:  Speech Language Pathology Goals  Goals expected to be met by 4/30  1. Pt will tolerate puree diet with nectar thick liquids without overt s/s aspiration.   2. Pt will tolerate trials of thin liquids without overt s/s aspiration.   3. Pt will tolerate trials of solids without overt s/s aspiration.  4. Pt will answer simple y/n questions via any modality with 60% accy.  5. Pt will follow simple commands with 50% accy given max cues.   6. Pt will complete auto speech tasks with 30% accy given max cues.                            Plan:     · Patient to be seen:  4 x/week   · Plan of Care expires:  05/23/20  · Plan of Care reviewed with:  patient   · SLP Follow-Up:  Yes       Discharge recommendations:  Discharge Facility/Level of Care Needs: rehabilitation facility   Barriers to Discharge:  Level of Skilled Assistance Needed      Time Tracking:     SLP Treatment Date:   04/23/20  Speech Start Time:  0806  Speech Stop Time:  0824     Speech Total  Time (min):  18 min    Billable Minutes: Eval 10  and Eval Swallow and Oral Function 8    DARREN Box, CCC-SLP  04/23/2020

## 2020-04-23 NOTE — PROGRESS NOTES
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 female with PMHx of HTN and HLD who presented to OSH with RSW and aphasia. She was seen via telemedicine and not eligible for tPA (last known normal yesterday). CTA obtained at OSH with L ICA occlusion and patient transferred to [facility] for possible IR intervention. CT head on arrival to [facility] with ischemic changes in L insula. Patient went to IR for angioplasty and aspiration thrombectomy. MRI demonstrated involvment of L MCA, L PCA and L cerebellar aa with a R cerebellar finding as well. Etiology most likely embolic in nature v possible KIM. TTE demonstrated EF 70% with no other significant findings. Patient has already tested negative for COVID    Likely embolic in nature    Recommend obtaining CTA due to involvement of several aa, thereby possibly implicating vasculitis  Patient would likely benefit from an event monitor    Antithrombotics for secondary stroke prevention: Antiplatelets: Aspirin: 81 mg daily, Plavix 75    Statins for secondary stroke prevention and hyperlipidemia, if present:   Statins: Atorvastatin- 40 mg daily    Aggressive risk factor modification: HTN, HLD     Rehab efforts: The patient has been evaluated by a stroke team provider and the therapy needs have been fully considered based off the presenting complaints and exam findings. The following therapy evaluations are needed: PT evaluate and treat, OT evaluate and treat, SLP evaluate and treat, PM&R evaluate for appropriate placement    Diagnostics ordered/pending: Recommend obtaining CTA    VTE prophylaxis: Heparin 5000 units SQ every 8 hours    BP parameters: Infarct: Post sucessful thrombectomy, SBP <140          Cytotoxic cerebral edema  Area of cytotoxic cerebral edema identified when reviewing brain imaging in the territory of the L  internal carotid artery. There is no mass effect associated with it. We will continue to monitor the patients clinical exam for any worsening of symptoms which may indicate expansion of the stroke or the area of the edema resulting in the clinical change. The pattern is suggestive of KIM vs ESUS etiology.        Hyperlipidemia  Stroke risk factor  Lipid panel pending  Home medications unknown  Recommend starting atorvastatin 40 mg daily    Essential hypertension  Stroke risk factor  SBP <220 for unsuccessful thrombectomy  SBP <140 for successful thrombectomy         No notes on file    STROKE DOCUMENTATION   Acute Stroke Times   Last Known Normal Date: 04/21/20  Last Known Normal Time: 1800  Symptom Onset Date: (unknown)  Symptom Onset Time: (unknown)  Stroke Team Called Date: 04/22/20  Stroke Team Called Time: 1415  Stroke Team Arrival Date: 04/22/20  Stroke Team Arrival Time: 1415  CT Interpretation Time: 1425  Decision to Treat Time for Alteplase: (decision made at OSH via telemedicine)  Decision to Treat Time for IR: 1425    NIH Scale:  1a. Level of Consciousness: 1-->Not alert, but arousable by minor stimulation to obey, answer, or respond  1b. LOC Questions: 2-->Answers neither question correctly  1c. LOC Commands: 2-->Performs neither task correctly  2. Best Gaze: 0-->Normal  3. Visual: 0-->No visual loss  4. Facial Palsy: 0-->Normal symmetrical movements  5a. Motor Arm, Left: 2-->Some effort against gravity, limb cannot get to or maintain (if cued) 90 (or 45) degrees, drifts down to bed, but has some effort against gravity  5b. Motor Arm, Right: 2-->Some effort against gravity, limb cannot get to or maintain (if cued) 90 (or 45) degrees, drifts down to bed, but has some effort against gravity  6a. Motor Leg, Left: 2-->Some effort against gravity, leg falls to bed by 5 secs, but has some effort against gravity  6b. Motor Leg, Right: 2-->Some effort against gravity, leg falls to bed by 5 secs, but has some  effort against gravity  7. Limb Ataxia: 0-->Absent  8. Sensory: 0-->Normal, no sensory loss  9. Best Language: 2-->Severe aphasia, all communication is through fragmentary expression, great need for inference, questioning, and guessing by the listener. Range of information that can be exchanged is limited, listener carries burden of. . . (see row details)  10. Dysarthria: 0-->Normal  11. Extinction and Inattention (formerly Neglect): 0-->No abnormality  Total (NIH Stroke Scale): 15       Modified Delmis    [place] Coma Scale:    ABCD2 Score:    [id number] Score:   HAS -BLED Score:   ICH Score:   Hunt & Dahl Classification:      Hemorrhagic change of an Ischemic Stroke: Does this patient have an ischemic stroke with hemorrhagic changes? No         Subjective:     Interval History: No acute events overnight. Patient had no new or worseniong symptoms. Covid negative. Patient will likely benefit from an event monitor.    Review of Systems   Constitutional: Negative for chills and fever.   Respiratory: Negative for cough.    Gastrointestinal: Negative for nausea and vomiting.   Neurological: Positive for facial asymmetry, speech difficulty and weakness. Negative for numbness.   Psychiatric/Behavioral: Negative for agitation.     Objective:     Vital Signs (Most Recent):  Temp: 98.6 °F (37 °C) (04/23/20 1101)  Pulse: 65 (04/23/20 1325)  Resp: 15 (04/23/20 1325)  BP: (!) 118/56 (04/23/20 1325)  SpO2: 96 % (04/23/20 1325)    Vital Signs Range (Last 24H):  Temp:  [97.6 °F (36.4 °C)-99.3 °F (37.4 °C)]   Pulse:  [50-80]   Resp:  [8-31]   BP: (113-225)/()   SpO2:  [92 %-100 %]   Arterial Line BP: ()/(54-88)     Physical Exam   Constitutional: She appears well-developed and well-nourished. No distress.   HENT:   Head: Normocephalic and atraumatic.   Eyes: EOM are normal.   Cardiovascular: Normal rate.   Pulmonary/Chest: Effort normal. No respiratory distress.   Neurological: She is alert.   Skin: Skin is warm and  dry.   Vitals reviewed.      Neurological Exam:   LOC: alert  Attention Span: poor  Language: Global aphasia  Articulation: Untestable due to severe aphasia   Orientation: Untestable due to severe aphasia   Visual Fields: Full  EOM (CN III, IV, VI): Full/intact  Facial Movement (CN VII): Lower facial weakness on the Right  Motor: Arm left  Normal 5/5  Leg left  Normal 5/5  Arm right  Normal 5/5  Leg right Paresis: 4/5  Sensation: Intact to light touch, temperature and vibration  Tone: Normal tone throughout      Laboratory:  CMP:   Recent Labs   Lab 04/23/20  0144 04/23/20  1241   CALCIUM 8.5*  --    ALBUMIN 3.6  --    PROT 6.6  --      --    K 3.6 4.4   CO2 22*  --      --    BUN 9  --    CREATININE 0.7  --    ALKPHOS 73  --    ALT 14  --    AST 22  --    BILITOT 0.6  --      CBC:   Recent Labs   Lab 04/23/20  0144   WBC 10.70   RBC 3.99*   HGB 11.4*   HCT 35.6*      MCV 89   MCH 28.6   MCHC 32.0     Lipid Panel:   Recent Labs   Lab 04/22/20  1720   CHOL 220*   LDLCALC 135.2   HDL 61   TRIG 119     Coagulation: No results for input(s): PT, INR, APTT in the last 168 hours.  Hgb A1C:   Recent Labs   Lab 04/22/20  1720   HGBA1C 5.6     TSH:   Recent Labs   Lab 04/22/20  1720   TSH 3.990       Diagnostic Results:      Brain imaging:  CT Head. Date: 04/22/20  Ill-defined region of decreased attenuation left insula anteriorly concerning for acute/recent infarction.  No significant mass effect or evidence for hemorrhagic conversion.    Vessel Imaging:  CTA OSH. Date: 04/22/20  L ICA occlusion          Josafat Bender MD  Comprehensive Stroke Center  Department of Vascular Neurology   [facility]

## 2020-04-23 NOTE — PROGRESS NOTES
Assumed pt care at 1740 from KEMAL Calderon who transported pt from IR.  Right groin site clean dry to bleeding or hematoma , bilat pedial pulses.  See flow sheet for assessment and vital signs.

## 2020-04-23 NOTE — SUBJECTIVE & OBJECTIVE
Admit Date: 4/22/2020  LOS: 1    CC: Acute ischemic left internal carotid artery (ICA) stroke    Code Status: Full Code     SUBJECTIVE:     Interval History/Significant Events: NAEON, can start aspirin and plavix    Review of Symptoms:   Constitutional: Negative for chills and fever.   Respiratory: Negative for cough.    Gastrointestinal: Negative for nausea and vomiting.   Neurological: Positive for facial asymmetry, speech difficulty and weakness. Negative for numbness.   Psychiatric/Behavioral: Negative for agitation.      .     Medications:  Continuous Infusions:   nicardipine Stopped (04/23/20 0023)     Scheduled Meds:   aspirin  81 mg Oral Daily    atorvastatin  40 mg Oral Daily    clopidogreL  75 mg Oral Daily    heparin (porcine)  5,000 Units Subcutaneous Q8H    polyethylene glycol  17 g Oral Daily    senna-docusate 8.6-50 mg  1 tablet Oral BID     PRN Meds:.acetaminophen, calcium gluconate IVPB, calcium gluconate IVPB, calcium gluconate IVPB, magnesium sulfate IVPB, magnesium sulfate IVPB, ondansetron, potassium chloride in water **AND** potassium chloride in water **AND** potassium chloride in water, promethazine (PHENERGAN) IVPB, sodium chloride 0.9%, sodium phosphate IVPB, sodium phosphate IVPB, sodium phosphate IVPB    OBJECTIVE:   Vital Signs (Most Recent):   Temp: 99 °F (37.2 °C) (04/23/20 1525)  Pulse: 69 (04/23/20 1525)  Resp: 16 (04/23/20 1525)  BP: (!) 140/60 (04/23/20 1525)  SpO2: 96 % (04/23/20 1525)    Vital Signs (24h Range):   Temp:  [97.6 °F (36.4 °C)-99.3 °F (37.4 °C)] 99 °F (37.2 °C)  Pulse:  [50-80] 69  Resp:  [8-31] 16  SpO2:  [92 %-100 %] 96 %  BP: (113-221)/() 140/60  Arterial Line BP: ()/(53-88) 116/73    ICP/CPP (Last 24h):        I & O (Last 24h):     Intake/Output Summary (Last 24 hours) at 4/23/2020 1538  Last data filed at 4/23/2020 1200  Gross per 24 hour   Intake 647.29 ml   Output 600 ml   Net 47.29 ml     Physical Exam:  GA: Alert, comfortable, no acute  distress.   HEENT: No scleral icterus or JVD.   Pulmonary: Clear to auscultation A/L. No wheezing, crackles, or rhonchi.  Cardiac: RRR S1 & S2 w/o rubs/murmurs/gallops.   Abdominal: Bowel sounds present x 4. No appreciable hepatosplenomegaly.  Skin: No jaundice, rashes, or visible lesions.  Pulses: 1+ DP bilat     Neuro:  --sedation: none  --GCS: E4V2M5  --Mental Status: awake, Global aphasia, follows commands, left gaze predominance  --CN II-XII grossly intact, Facial Movement (CN VII): Lower facial weakness on the Right.   --Pupils 3-->2mm, PERRL.   --brainstem: intact  --Motor:   --Arm left  Normal 5/5  --Leg left  Normal 5/5  --Arm right  paresis 4/5  --Leg right Paresis: 4/5  --Sensation: Intact to light touch, temperature and vibration  --Tone: Normal tone throughout  --sensory: intact to soft touch and pain throughout  --Reflexes: not tested  --Gait: deferred     Vent Data: N/A       Lines/Drains/Airway:            Arterial Line 04/22/20 1800 Left Radial (Active)   Site Assessment Clean;Dry;Intact;No redness;No swelling 4/23/2020  3:25 PM   Line Status Pulsatile blood flow 4/23/2020  3:25 PM   Art Line Waveform Appropriate;Square wave test performed 4/23/2020  3:25 PM   Arterial Line Interventions Zeroed and calibrated;Leveled 4/23/2020  3:25 PM   Color/Movement/Sensation Capillary refill less than 3 sec 4/23/2020  3:25 PM   Dressing Type Biopatch in place;Central line dressing with pants 4/23/2020  3:25 PM   Dressing Status Clean;Dry;Intact 4/23/2020  3:25 PM   Dressing Intervention Integrity maintained 4/23/2020  3:25 PM   Dressing Change Due 04/29/20 4/23/2020  3:25 PM      Female External Urinary Catheter 04/23/20 0200 (Active)   Skin no redness;no breakdown 4/23/2020  3:25 PM   Tolerance no signs/symptoms of discomfort 4/23/2020  3:25 PM   Suction Continuous suction at 70 mmHg 4/23/2020  7:25 AM   Date of last wick change 04/23/20 4/23/2020  7:25 AM   Time of last wick change 0701 4/23/2020  7:25 AM    Output (mL) 200 mL 2020  6:00 AM     Nutrition/Tube Feeds (if NPO state why): speech and swallow evalaution    Labs:  ABG: No results for input(s): PH, PO2, PCO2, HCO3, POCSATURATED, BE in the last 24 hours.  BMP:  Recent Labs   Lab 20  0144 20  1241     --    K 3.6 4.4     --    CO2 22*  --    BUN 9  --    CREATININE 0.7  --      --    MG 2.0  --    PHOS 3.7  --      LFT:   Lab Results   Component Value Date    AST 22 2020    ALT 14 2020    ALKPHOS 73 2020    BILITOT 0.6 2020    ALBUMIN 3.6 2020    PROT 6.6 2020     CBC:   Lab Results   Component Value Date    WBC 10.70 2020    HGB 11.4 (L) 2020    HCT 35.6 (L) 2020    MCV 89 2020     2020     Microbiology x 7d:   Microbiology Results (last 7 days)     ** No results found for the last 168 hours. **        Imagin20    Prominent areas of acute ischemia/infarct involving the left cerebral hemisphere as detailed above with additional focal areas involving the left cerebellum and a focus of the right cerebellum.    Focal area of signal hypointensity of the left thalamus may relate to an area of focal hemorrhagic conversion a large degree of hemorrhagic conversion is not appreciated.    Signal abnormality of the left internal carotid artery likely relates to diminished flow or potentially lack of flow, correlation with the recent angiographic procedure is recommended.    This report was flagged in Epic as abnormal.  I personally reviewed the above image.    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

## 2020-04-23 NOTE — PROGRESS NOTES
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 Clement Moore is a 79 year old lady with PMH significant for HTN, HLD who was transferred from [facility] with aphasia and RSW. Patient had initially been taken to the ED when her neighbors found her wandering around outside. She presented with right sided weakness and aphasia to the OSH ED and was evaluated via Tele stroke. Since she was last known well yesterday 04/21/20, she was not a candidate for TPA. CTA at OSH revealed L ICA occlusion and she was transferred to [facility] for possible IR intervention. Patient is primarily Kittitian speaking and aphasic with no family present, Therefore all history was obtained via chart review. Patient tested negative for COVID19 and CT head on arrival to [facility] revealed ischemic changes in L insula. She was taken to IR for possible thrombectomy with etiology of stroke unclear at this time. Likely embolic, differential includes KIM vs ESUS.  Patient admitted to [facility] s/p thrombectomy for higher level of care.    Hospital Course: 04/22/20: Patient admitted to [facility] s/p thrombectomy for L ICA occlusion  04/23/20: CARLOS EDUARDO, patient globally aphasic. Patient's friend updated as no family contact is available.    Admit Date: 4/22/2020  LOS: 1    CC: Acute ischemic left internal carotid artery (ICA) stroke    Code Status: Full Code     SUBJECTIVE:     Interval History/Significant Events: NAEON, can start aspirin and plavix    Review of Symptoms:   Constitutional: Negative for chills and fever.   Respiratory: Negative for cough.    Gastrointestinal: Negative for nausea and vomiting.   Neurological: Positive for facial asymmetry, speech difficulty and weakness. Negative for numbness.   Psychiatric/Behavioral: Negative for  agitation.      .     Medications:  Continuous Infusions:   nicardipine Stopped (04/23/20 0023)     Scheduled Meds:   aspirin  81 mg Oral Daily    atorvastatin  40 mg Oral Daily    clopidogreL  75 mg Oral Daily    heparin (porcine)  5,000 Units Subcutaneous Q8H    polyethylene glycol  17 g Oral Daily    senna-docusate 8.6-50 mg  1 tablet Oral BID     PRN Meds:.acetaminophen, calcium gluconate IVPB, calcium gluconate IVPB, calcium gluconate IVPB, magnesium sulfate IVPB, magnesium sulfate IVPB, ondansetron, potassium chloride in water **AND** potassium chloride in water **AND** potassium chloride in water, promethazine (PHENERGAN) IVPB, sodium chloride 0.9%, sodium phosphate IVPB, sodium phosphate IVPB, sodium phosphate IVPB    OBJECTIVE:   Vital Signs (Most Recent):   Temp: 99 °F (37.2 °C) (04/23/20 1525)  Pulse: 69 (04/23/20 1525)  Resp: 16 (04/23/20 1525)  BP: (!) 140/60 (04/23/20 1525)  SpO2: 96 % (04/23/20 1525)    Vital Signs (24h Range):   Temp:  [97.6 °F (36.4 °C)-99.3 °F (37.4 °C)] 99 °F (37.2 °C)  Pulse:  [50-80] 69  Resp:  [8-31] 16  SpO2:  [92 %-100 %] 96 %  BP: (113-221)/() 140/60  Arterial Line BP: ()/(53-88) 116/73    ICP/CPP (Last 24h):        I & O (Last 24h):     Intake/Output Summary (Last 24 hours) at 4/23/2020 1538  Last data filed at 4/23/2020 1200  Gross per 24 hour   Intake 647.29 ml   Output 600 ml   Net 47.29 ml     Physical Exam:  GA: Alert, comfortable, no acute distress.   HEENT: No scleral icterus or JVD.   Pulmonary: Clear to auscultation A/L. No wheezing, crackles, or rhonchi.  Cardiac: RRR S1 & S2 w/o rubs/murmurs/gallops.   Abdominal: Bowel sounds present x 4. No appreciable hepatosplenomegaly.  Skin: No jaundice, rashes, or visible lesions.  Pulses: 1+ DP bilat     Neuro:  --sedation: none  --GCS: E4V2M5  --Mental Status: awake, Global aphasia, follows commands, left gaze predominance  --CN II-XII grossly intact, Facial Movement (CN VII): Lower facial weakness on  the Right.   --Pupils 3-->2mm, PERRL.   --brainstem: intact  --Motor:   --Arm left  Normal 5/5  --Leg left  Normal 5/5  --Arm right  paresis 4/5  --Leg right Paresis: 4/5  --Sensation: Intact to light touch, temperature and vibration  --Tone: Normal tone throughout  --sensory: intact to soft touch and pain throughout  --Reflexes: not tested  --Gait: deferred     Vent Data: N/A       Lines/Drains/Airway:            Arterial Line 04/22/20 1800 Left Radial (Active)   Site Assessment Clean;Dry;Intact;No redness;No swelling 4/23/2020  3:25 PM   Line Status Pulsatile blood flow 4/23/2020  3:25 PM   Art Line Waveform Appropriate;Square wave test performed 4/23/2020  3:25 PM   Arterial Line Interventions Zeroed and calibrated;Leveled 4/23/2020  3:25 PM   Color/Movement/Sensation Capillary refill less than 3 sec 4/23/2020  3:25 PM   Dressing Type Biopatch in place;Central line dressing with pants 4/23/2020  3:25 PM   Dressing Status Clean;Dry;Intact 4/23/2020  3:25 PM   Dressing Intervention Integrity maintained 4/23/2020  3:25 PM   Dressing Change Due 04/29/20 4/23/2020  3:25 PM      Female External Urinary Catheter 04/23/20 0200 (Active)   Skin no redness;no breakdown 4/23/2020  3:25 PM   Tolerance no signs/symptoms of discomfort 4/23/2020  3:25 PM   Suction Continuous suction at 70 mmHg 4/23/2020  7:25 AM   Date of last wick change 04/23/20 4/23/2020  7:25 AM   Time of last wick change 0701 4/23/2020  7:25 AM   Output (mL) 200 mL 4/23/2020  6:00 AM     Nutrition/Tube Feeds (if NPO state why): speech and swallow evalaution    Labs:  ABG: No results for input(s): PH, PO2, PCO2, HCO3, POCSATURATED, BE in the last 24 hours.  BMP:  Recent Labs   Lab 04/23/20  0144 04/23/20  1241     --    K 3.6 4.4     --    CO2 22*  --    BUN 9  --    CREATININE 0.7  --      --    MG 2.0  --    PHOS 3.7  --      LFT:   Lab Results   Component Value Date    AST 22 04/23/2020    ALT 14 04/23/2020    ALKPHOS 73 04/23/2020     BILITOT 0.6 2020    ALBUMIN 3.6 2020    PROT 6.6 2020     CBC:   Lab Results   Component Value Date    WBC 10.70 2020    HGB 11.4 (L) 2020    HCT 35.6 (L) 2020    MCV 89 2020     2020     Microbiology x 7d:   Microbiology Results (last 7 days)     ** No results found for the last 168 hours. **        Imagin20    Prominent areas of acute ischemia/infarct involving the left cerebral hemisphere as detailed above with additional focal areas involving the left cerebellum and a focus of the right cerebellum.    Focal area of signal hypointensity of the left thalamus may relate to an area of focal hemorrhagic conversion a large degree of hemorrhagic conversion is not appreciated.    Signal abnormality of the left internal carotid artery likely relates to diminished flow or potentially lack of flow, correlation with the recent angiographic procedure is recommended.    This report was flagged in Epic as abnormal.  I personally reviewed the above image.    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 Patient admitted to [facility] s/p acute intervention. Patient is primarily Telugu speaking and aphasic with no family present, Therefore all history was obtained via chart review. Etiology of stroke unclear at this time. Likely embolic, differential includes KIM vs  ESUS.    Neuro:      Embolic stroke involving left Internal Carotid Artery  -S/p thrombectomy, 4/22 TICI 2A  -neuro checks and vital signs q30 min x 6hrs post procedure, then q1hr x 16hrs  -aspirin 325 and statin 40mg daily   -Plavix 75mg daily  -MRI first tomorrow then start DAPT        Pulmonary:      Supportive oxygenation  -monitor with continuous pulse oximetry  -titrate supplemental 02 to achieve sats > 92%       Cardiac:      Hypertension  -if needed, start nicardipene gtt @5mg/hr   -goal SBP<160        Renal:       Fluid balance   -strict I/Os        ID:      COVID negative, afebrile, no Leukocytosis     Hem/Onc:      Monitor H/H closely    -aspirin 81mg daily,   -Plavix 75mg daily     Endocrine:     Follow A1C results  Glucose control  -POCT q6hrs      Fluids/Electrolytes/Nutrition/GI:      -NPO until speech evaluation   -NG tube for meds          Cytotoxic cerebral edema  Monitor closely, repeat head imaging MRI in 24hrs    Cardiac/Vascular  Hyperlipidemia  Atorvastatin 40mg daily    Essential hypertension  Goal SBP <160          The patient is being Prophylaxed for:  Venous Thromboembolism with: Mechanical or Chemical  Stress Ulcer with: Not Applicable   Ventilator Pneumonia with: not applicable    Activity Orders          Diet Dysphagia Pureed (IDDSI Level 4) [facility]; Nectar Thick: Dysphagia 1 (Pureed) starting at 04/23 0949        Full Code    Bc Robles MD  Neurocritical Care fellow  Neurocritical care  [facility]

## 2020-04-23 NOTE — SUBJECTIVE & OBJECTIVE
Subjective:     Interval History: No acute events overnight. Patient had no new or worseniong symptoms. Covid negative. Patient will likely benefit from an event monitor.    Review of Systems   Constitutional: Negative for chills and fever.   Respiratory: Negative for cough.    Gastrointestinal: Negative for nausea and vomiting.   Neurological: Positive for facial asymmetry, speech difficulty and weakness. Negative for numbness.   Psychiatric/Behavioral: Negative for agitation.     Objective:     Vital Signs (Most Recent):  Temp: 98.6 °F (37 °C) (04/23/20 1101)  Pulse: 65 (04/23/20 1325)  Resp: 15 (04/23/20 1325)  BP: (!) 118/56 (04/23/20 1325)  SpO2: 96 % (04/23/20 1325)    Vital Signs Range (Last 24H):  Temp:  [97.6 °F (36.4 °C)-99.3 °F (37.4 °C)]   Pulse:  [50-80]   Resp:  [8-31]   BP: (113-225)/()   SpO2:  [92 %-100 %]   Arterial Line BP: ()/(54-88)     Physical Exam   Constitutional: She appears well-developed and well-nourished. No distress.   HENT:   Head: Normocephalic and atraumatic.   Eyes: EOM are normal.   Cardiovascular: Normal rate.   Pulmonary/Chest: Effort normal. No respiratory distress.   Neurological: She is alert.   Skin: Skin is warm and dry.   Vitals reviewed.      Neurological Exam:   LOC: alert  Attention Span: poor  Language: Global aphasia  Articulation: Untestable due to severe aphasia   Orientation: Untestable due to severe aphasia   Visual Fields: Full  EOM (CN III, IV, VI): Full/intact  Facial Movement (CN VII): Lower facial weakness on the Right  Motor: Arm left  Normal 5/5  Leg left  Normal 5/5  Arm right  Normal 5/5  Leg right Paresis: 4/5  Sensation: Intact to light touch, temperature and vibration  Tone: Normal tone throughout      Laboratory:  CMP:   Recent Labs   Lab 04/23/20  0144 04/23/20  1241   CALCIUM 8.5*  --    ALBUMIN 3.6  --    PROT 6.6  --      --    K 3.6 4.4   CO2 22*  --      --    BUN 9  --    CREATININE 0.7  --    ALKPHOS 73  --    ALT  14  --    AST 22  --    BILITOT 0.6  --      CBC:   Recent Labs   Lab 04/23/20  0144   WBC 10.70   RBC 3.99*   HGB 11.4*   HCT 35.6*      MCV 89   MCH 28.6   MCHC 32.0     Lipid Panel:   Recent Labs   Lab 04/22/20  1720   CHOL 220*   LDLCALC 135.2   HDL 61   TRIG 119     Coagulation: No results for input(s): PT, INR, APTT in the last 168 hours.  Hgb A1C:   Recent Labs   Lab 04/22/20  1720   HGBA1C 5.6     TSH:   Recent Labs   Lab 04/22/20  1720   TSH 3.990       Diagnostic Results:      Brain imaging:  CT Head. Date: 04/22/20  Ill-defined region of decreased attenuation left insula anteriorly concerning for acute/recent infarction.  No significant mass effect or evidence for hemorrhagic conversion.    Vessel Imaging:  CTA OSH. Date: 04/22/20  L ICA occlusion

## 2020-04-23 NOTE — PLAN OF CARE
Due to COVID 19 restrictions limiting patient contact and visits, Discharge Planning Assessment completed via phone with patient's friend, Nedra Hyman [phone].     Patient is unable to answer questions due to aphasia.  Per friend, the patient lives alone in a single story house with a ramp to enter.   Per friend, the patient was independent with ADLS and used no dme for ambulation.  Patient will have limited assistance from friends upon discharge.   Per Mr. Hyman, the patient has no family in the [place].  Mr. Hyman stated that patient's family is in [place].  Per Mr. Hyman, the patient's  passed away and she has been living alone.  Mr. Hyman stated that it depends on what assistance she may need as to whether he can assist. CM will follow for needs.         04/23/20 4386   Discharge Assessment   Assessment Type Discharge Planning Assessment   Confirmed/corrected address and phone number on facesheet? Yes   Assessment information obtained from? Caregiver  (Friend, Nedra Hyman [phone])   Expected Length of Stay (days) 7   Communicated expected length of stay with patient/caregiver yes   Prior to hospitilization cognitive status: Alert/Oriented   Prior to hospitalization functional status: Independent   Current cognitive status: Unable to Assess  (aphasia)   Current Functional Status: Needs Assistance   [facility]: [facility]   Lives With alone   Able to Return to Prior Arrangements other (see comments)  (sehlly)   Is patient able to care for self after discharge? Unable to determine at this time (comments)   Who are your caregiver(s) and their phone number(s)? Nedra Hyman [phone]   Patient's perception of discharge disposition other (comments)  (shelly)   Readmission Within the Last 30 Days no previous admission in last 30 days   Patient currently being followed by outpatient case management? No   Patient currently receives any other outside  agency services? No   Equipment Currently Used at Home none   Do you have any problems affording any of your prescribed medications? TBD   Is the patient taking medications as prescribed?   (shelly)   Does the patient have transportation home? Yes   Transportation Anticipated family or friend will provide   Does the patient receive services at the [facility]? No   Discharge Plan A Rehab   Discharge Plan B Home Health   DME Needed Upon Discharge  other (see comments)  (tbd)   Patient/Family in Agreement with Plan unable to assess

## 2020-04-23 NOTE — PT/OT/SLP EVAL
Physical Therapy Evaluation    Patient Name:  Teresa Vaughan   MRN:  [id number]    Recommendations:     Discharge Recommendations:  rehabilitation facility   Discharge Equipment Recommendations: (TBD pending progress witht therapy)   Barriers to discharge: Inaccessible home, Decreased caregiver support and risk of falls    Assessment:     Teresa Vaughan is a 79 y.o. 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  She presents with the following impairments/functional limitations:  weakness, impaired endurance, impaired self care skills, gait instability, impaired functional mobilty, impaired sensation, impaired balance, visual deficits, decreased coordination, impaired cognition, decreased upper extremity function, decreased lower extremity function, decreased safety awareness, abnormal tone, impaired coordination, impaired fine motor, edema. The patient demonstrates global aphasia, R hemianopsia and inattention, R hemiparesis. She is able to participate in functional mobility tasks with max manual cuing for initiation. She performed bed mobility and sit to stand with maximum assistance and max manual cuing. She performed 2 lateral sidesteps to L with maximum assistance, R knee buckling with weight bearing. She is not safe to return home due to the above deficits and risk of falls.  Based on the patient's progress with therapy, motivation to participate in treatment, and prior level of independence, they are an excellent candidate for inpatient rehabilitation and they would benefit from rehab to maximize their functional potential.      Language line used throughout for  (ID [id number]).     Rehab Prognosis: Good; patient would benefit from acute skilled PT services to address these deficits and reach maximum level of function.    Recent Surgery: * No surgery found *      Plan:     During this hospitalization, patient to be seen 4 x/week to address  "the identified rehab impairments via gait training, therapeutic activities, therapeutic exercises, neuromuscular re-education and progress toward the following goals:    · Plan of Care Expires:  20    Subjective     Chief Complaint: unable to state  Patient/Family Comments/goals: unable to state  Pain/Comfort:  · Pain Rating 1: (no indication of pain, patient unable to rate)    Patients cultural, spiritual, Scientologist conflicts given the current situation: yes( via language line,  ID [id number])    Living Environment:  Patient unable to report due to aphasia. Per chart review, patient lives alone in a H, ramp to enter.  , family lives in [place].   Prior to admission, patients level of function was independent did not use AD.  Equipment used at home: none.  DME owned (not currently used): none.  Upon discharge, patient will have assistance from- minimal support, family lives in [place].    Objective:     Communicated with RN prior to session.  Patient found HOB elevated with bed alarm, blood pressure cuff, pulse ox (continuous), telemetry, peripheral IV, PureWick, arterial line, SCD  upon PT entry to room.    General Precautions: Standard, aphasia, fall   Orthopedic Precautions:N/A   Braces: N/A     Exams:    Cognitive Exam  Patient is A&O x0, unable to follow simple commands, answer "yes/no" to orientation questions, only made one attempt to verbalize throughout session, and follows 0% of one -step commands  -R hemianopsia, R inattention   Fine Motor Coordination   -       Impaired R, hemiparesis     Postural Exam Patient presented with the following abnormalities:    -       Rounded shoulders  -       Forward head  -       Kyphosis  -       Posterior pelvic tilt  -       Weight shift posterior and R   Sensation    -       Light touch diminished response to touch R vs L    Skin Integrity/Edema     -       Skin integrity: visibly intact  -       Edema: mild " swelling R forearm and hand   R LE ROM WFL   R LE Strength WFL   L LE ROM WFL   L LE Strength  2-/5 hip flexion, knee ext/flex; based on movement partial ROM against gravity, testing limited 2/2 aphasia     Balance          Static Sitting minimum assistance    Dynamic Sitting moderate assistance    Static Standing maximum assistance    Dynamic Standing maximum assistance                   Functional Mobility:    Bed Mobility  Rolling to L: maximum assistance  Supine to Sit on the L side:  maximum assistance, max verbal/manual cues, patient initiating task with extensive cuing  Sit to supine: moderate assistance  Scoot to HOB in supine: total assistance x2 drawsheet  Scoot to EOB in sitting: moderate assistance, patient initiating with manual cues   Transfers Sit to Stand:  maximum assistance, blocking R knee, facilitation at hips for anterior weight shift and hip extension   Gait  Gait Distance: 2 sidesteps to L with hand held assist   Assistance Level: maximum assistance   Description: legs braced against bed, wide LYNETTE, minimal weight bearing through R LE, flexed through hips/trunk/knees    Gait training: facilitation at pelvis for weight shift, blocking R knee- significant buckling- with weight bearing, max assist to progress R LE          Therapeutic Activities and Exercises:   Patient educated on role of therapy, goals of session, benefits of out of bed mobility. Patient agreeable to mobilize with therapy.  Discussed PT plan of care during hospitalization. Patient educated that they need to call for assistance to mobilize out of bed. Whiteboard updated as appropriate. Patient educated on how their diagnosis impacts their mobility within PT scope of practice.     Sitting edge of bed 8 minutes, minimum assistance to moderate assistance, weight shift posterior and to R  -Posterior pelvic tilt, kyphosis, cervical flexion  -Visual/verbal/manual cues for midline orientation, thoracic and cervical extension  -Hand over  hand placement of R hand in weightbearing for proprioception, wrist and finger extension, shoulder approximation  -Cued repeatedly to maintain L hand in lap or holding rail  -Cues for visual tracking to midline  -Performed weight shift to L forearm, held 10 sec to improve proprioception and improve midline orientation  -Minimal ability to self correct with posterior loss of balance       AM-PAC 6 CLICK MOBILITY  Total Score:10     Patient left HOB elevated, R UE elevated and abducted, heels floating with all lines intact, call button in reach, bed alarm on and RN notified.    GOALS:   Multidisciplinary Problems     Physical Therapy Goals        Problem: Physical Therapy Goal    Goal Priority Disciplines Outcome Goal Variances Interventions   Physical Therapy Goal     PT, PT/OT Ongoing, Progressing     Description:  Goals to be met by: 5/3     Patient will increase functional independence with mobility by performin. Supine to sit with MInimal Assistance  2. Sit to supine with MInimal Assistance  3. Sit to stand transfer with Moderate Assistance  4. Bed to chair transfer with Moderate Assistance using squat pivot technique  5. Gait  x 5 feet with Maximum Assistance using least restrictive device or no AD.    6. Sitting at edge of bed x10 minutes with Stand-by Assistance while performing a dynamic reaching task with no UE support on bed to prepare for functional activities in sitting  7. Lower extremity exercise program x15 reps per handout, with assistance as needed to improve strength and neuromuscular control to increase independence with mobility.                      History:     History reviewed. No pertinent past medical history.    History reviewed. No pertinent surgical history.    Time Tracking:     PT Received On: 20  PT Start Time: 922     PT Stop Time: 955  PT Total Time (min): 33 min     Billable Minutes: Evaluation 12 and Neuromuscular Re-education 13 (co-eval with OT)      Jane Montero  PT  04/23/2020

## 2020-04-23 NOTE — PLAN OF CARE
POC reviewed with pt. Questions and concerns addressed. No acute events overnight. Pt travelled to MRI during shift. Pt on and off Cardene to maintain BP goal. Procedure site WDL. Potassium being replaced. Pt progressing toward goals. Will continue to monitor. See flowsheets for full assessment and VS info

## 2020-04-23 NOTE — PLAN OF CARE
Problem: Eating/Swallowing Impairment (Stroke, Ischemic/Transient Ischemic Attack)  Goal: Oral Intake without Aspiration  Outcome: Ongoing, Progressing  Intervention: Optimize Eating and Swallowing  Flowsheets (Taken 4/23/2020 1008)  Nutrition Support Management: other (see comments); weight trending reviewed     Problem: Oral Intake Inadequate  Goal: Improved Oral Intake  Outcome: Ongoing, Progressing  Intervention: Promote and Optimize Oral Intake  Flowsheets (Taken 4/23/2020 1008)  Oral Nutrition Promotion: calorie dense foods provided; other (see comments)     Recommendations     1.) Continue textured modified diet per SLP.   2.) Add Boost Pudding.   3.) Suggest MVI.   4.) Daily weights     Goals: 1.) Pt to consume/tolerate >75% of meals by follow up.   Nutrition Goal Status: new  Communication of RD Recs: other (comment)(POC)

## 2020-04-23 NOTE — PLAN OF CARE
CM called family contact listed on chart, attempted to leave msg, voicemail not set up. Referral sent     04/23/20 3959   Post-Acute Status   Post-Acute Authorization Placement;Other   Post-Acute Placement Status Referrals Sent   Kathryn Horowitz, MSN  Case Management  Ext [phone]

## 2020-04-23 NOTE — CONSULTS
Inpatient consult to Physical Medicine Rehab  Consult performed by: Ana Santoyo NP  Consult ordered by: Bc Robles MD  Reason for consult: assess rehab needs        Reviewed patient history and current admission.  Rehab team following.  Full consult to follow.    VANNA Ramey, FNP-C  Physical Medicine & Rehabilitation   04/23/2020

## 2020-04-24 PROBLEM — E78.2 MIXED HYPERLIPIDEMIA: Status: ACTIVE | Noted: 2020-04-22

## 2020-04-24 LAB
ALBUMIN SERPL BCP-MCNC: 3.3 G/DL (ref 3.5–5.2)
ALP SERPL-CCNC: 69 U/L (ref 55–135)
ALT SERPL W/O P-5'-P-CCNC: 13 U/L (ref 10–44)
ANION GAP SERPL CALC-SCNC: 7 MMOL/L (ref 8–16)
AST SERPL-CCNC: 24 U/L (ref 10–40)
BASOPHILS # BLD AUTO: 0.05 K/UL (ref 0–0.2)
BASOPHILS NFR BLD: 0.5 % (ref 0–1.9)
BILIRUB SERPL-MCNC: 0.6 MG/DL (ref 0.1–1)
BUN SERPL-MCNC: 10 MG/DL (ref 8–23)
CALCIUM SERPL-MCNC: 8.3 MG/DL (ref 8.7–10.5)
CHLORIDE SERPL-SCNC: 105 MMOL/L (ref 95–110)
CO2 SERPL-SCNC: 24 MMOL/L (ref 23–29)
CREAT SERPL-MCNC: 0.7 MG/DL (ref 0.5–1.4)
CRP SERPL-MCNC: 52.4 MG/L (ref 0–8.2)
DIFFERENTIAL METHOD: ABNORMAL
EOSINOPHIL # BLD AUTO: 0.2 K/UL (ref 0–0.5)
EOSINOPHIL NFR BLD: 2.2 % (ref 0–8)
ERYTHROCYTE [DISTWIDTH] IN BLOOD BY AUTOMATED COUNT: 13.5 % (ref 11.5–14.5)
ERYTHROCYTE [SEDIMENTATION RATE] IN BLOOD BY WESTERGREN METHOD: 63 MM/HR (ref 0–36)
EST. GFR  (AFRICAN AMERICAN): >60 ML/MIN/1.73 M^2
EST. GFR  (NON AFRICAN AMERICAN): >60 ML/MIN/1.73 M^2
GLUCOSE SERPL-MCNC: 110 MG/DL (ref 70–110)
HCT VFR BLD AUTO: 33.3 % (ref 37–48.5)
HGB BLD-MCNC: 10.7 G/DL (ref 12–16)
IMM GRANULOCYTES # BLD AUTO: 0.02 K/UL (ref 0–0.04)
IMM GRANULOCYTES NFR BLD AUTO: 0.2 % (ref 0–0.5)
LYMPHOCYTES # BLD AUTO: 2.1 K/UL (ref 1–4.8)
LYMPHOCYTES NFR BLD: 22.2 % (ref 18–48)
MAGNESIUM SERPL-MCNC: 2.1 MG/DL (ref 1.6–2.6)
MCH RBC QN AUTO: 29.2 PG (ref 27–31)
MCHC RBC AUTO-ENTMCNC: 32.1 G/DL (ref 32–36)
MCV RBC AUTO: 91 FL (ref 82–98)
MONOCYTES # BLD AUTO: 1 K/UL (ref 0.3–1)
MONOCYTES NFR BLD: 10.5 % (ref 4–15)
NEUTROPHILS # BLD AUTO: 6.1 K/UL (ref 1.8–7.7)
NEUTROPHILS NFR BLD: 64.4 % (ref 38–73)
NRBC BLD-RTO: 0 /100 WBC
PHOSPHATE SERPL-MCNC: 2.8 MG/DL (ref 2.7–4.5)
PLATELET # BLD AUTO: 166 K/UL (ref 150–350)
PMV BLD AUTO: 10.6 FL (ref 9.2–12.9)
POCT GLUCOSE: 83 MG/DL (ref 70–110)
POTASSIUM SERPL-SCNC: 3.9 MMOL/L (ref 3.5–5.1)
PROT SERPL-MCNC: 6.4 G/DL (ref 6–8.4)
RBC # BLD AUTO: 3.67 M/UL (ref 4–5.4)
SODIUM SERPL-SCNC: 136 MMOL/L (ref 136–145)
WBC # BLD AUTO: 9.49 K/UL (ref 3.9–12.7)

## 2020-04-24 PROCEDURE — 25500020 PHARM REV CODE 255: Performed by: PSYCHIATRY & NEUROLOGY

## 2020-04-24 PROCEDURE — 97530 THERAPEUTIC ACTIVITIES: CPT

## 2020-04-24 PROCEDURE — 99291 CRITICAL CARE FIRST HOUR: CPT | Mod: GC,,, | Performed by: PSYCHIATRY & NEUROLOGY

## 2020-04-24 PROCEDURE — A9698 NON-RAD CONTRAST MATERIALNOC: HCPCS | Performed by: PSYCHIATRY & NEUROLOGY

## 2020-04-24 PROCEDURE — 86140 C-REACTIVE PROTEIN: CPT

## 2020-04-24 PROCEDURE — 80053 COMPREHEN METABOLIC PANEL: CPT

## 2020-04-24 PROCEDURE — 99222 1ST HOSP IP/OBS MODERATE 55: CPT | Mod: ,,, | Performed by: NURSE PRACTITIONER

## 2020-04-24 PROCEDURE — 86235 NUCLEAR ANTIGEN ANTIBODY: CPT | Mod: 59

## 2020-04-24 PROCEDURE — 63600175 PHARM REV CODE 636 W HCPCS: Performed by: INTERNAL MEDICINE

## 2020-04-24 PROCEDURE — 99291 PR CRITICAL CARE, E/M 30-74 MINUTES: ICD-10-PCS | Mod: GC,,, | Performed by: PSYCHIATRY & NEUROLOGY

## 2020-04-24 PROCEDURE — 84100 ASSAY OF PHOSPHORUS: CPT

## 2020-04-24 PROCEDURE — 25000003 PHARM REV CODE 250: Performed by: INTERNAL MEDICINE

## 2020-04-24 PROCEDURE — 85652 RBC SED RATE AUTOMATED: CPT

## 2020-04-24 PROCEDURE — 83735 ASSAY OF MAGNESIUM: CPT

## 2020-04-24 PROCEDURE — 86039 ANTINUCLEAR ANTIBODIES (ANA): CPT

## 2020-04-24 PROCEDURE — 86038 ANTINUCLEAR ANTIBODIES: CPT

## 2020-04-24 PROCEDURE — 85025 COMPLETE CBC W/AUTO DIFF WBC: CPT

## 2020-04-24 PROCEDURE — 94761 N-INVAS EAR/PLS OXIMETRY MLT: CPT

## 2020-04-24 PROCEDURE — 36415 COLL VENOUS BLD VENIPUNCTURE: CPT

## 2020-04-24 PROCEDURE — 99233 PR SUBSEQUENT HOSPITAL CARE,LEVL III: ICD-10-PCS | Mod: GC,,, | Performed by: PSYCHIATRY & NEUROLOGY

## 2020-04-24 PROCEDURE — 99233 SBSQ HOSP IP/OBS HIGH 50: CPT | Mod: GC,,, | Performed by: PSYCHIATRY & NEUROLOGY

## 2020-04-24 PROCEDURE — 20600001 HC STEP DOWN PRIVATE ROOM

## 2020-04-24 PROCEDURE — 27000221 HC OXYGEN, UP TO 24 HOURS

## 2020-04-24 PROCEDURE — 97535 SELF CARE MNGMENT TRAINING: CPT

## 2020-04-24 PROCEDURE — 97112 NEUROMUSCULAR REEDUCATION: CPT

## 2020-04-24 PROCEDURE — 92611 MOTION FLUOROSCOPY/SWALLOW: CPT

## 2020-04-24 PROCEDURE — 92507 TX SP LANG VOICE COMM INDIV: CPT

## 2020-04-24 PROCEDURE — 99222 PR INITIAL HOSPITAL CARE,LEVL II: ICD-10-PCS | Mod: ,,, | Performed by: NURSE PRACTITIONER

## 2020-04-24 RX ADMIN — ATORVASTATIN CALCIUM 40 MG: 20 TABLET, FILM COATED ORAL at 08:04

## 2020-04-24 RX ADMIN — ASPIRIN 81 MG: 81 TABLET, COATED ORAL at 08:04

## 2020-04-24 RX ADMIN — POLYETHYLENE GLYCOL 3350 17 G: 17 POWDER, FOR SOLUTION ORAL at 08:04

## 2020-04-24 RX ADMIN — ACETAMINOPHEN 650 MG: 325 TABLET ORAL at 03:04

## 2020-04-24 RX ADMIN — STANDARDIZED SENNA CONCENTRATE AND DOCUSATE SODIUM 1 TABLET: 8.6; 5 TABLET ORAL at 09:04

## 2020-04-24 RX ADMIN — HEPARIN SODIUM 5000 UNITS: 5000 INJECTION INTRAVENOUS; SUBCUTANEOUS at 09:04

## 2020-04-24 RX ADMIN — HEPARIN SODIUM 5000 UNITS: 5000 INJECTION INTRAVENOUS; SUBCUTANEOUS at 03:04

## 2020-04-24 RX ADMIN — BARIUM SULFATE 30 ML: 0.81 POWDER, FOR SUSPENSION ORAL at 12:04

## 2020-04-24 RX ADMIN — CLOPIDOGREL BISULFATE 75 MG: 75 TABLET ORAL at 08:04

## 2020-04-24 RX ADMIN — HEPARIN SODIUM 5000 UNITS: 5000 INJECTION INTRAVENOUS; SUBCUTANEOUS at 05:04

## 2020-04-24 RX ADMIN — STANDARDIZED SENNA CONCENTRATE AND DOCUSATE SODIUM 1 TABLET: 8.6; 5 TABLET ORAL at 08:04

## 2020-04-24 RX ADMIN — ACETAMINOPHEN 650 MG: 325 TABLET ORAL at 09:04

## 2020-04-24 NOTE — PROVIDER TRANSFER
[facility]  Transfer of Care Note      Patient Name: Teresa Vaughan  MRN: [id number]  Admission Date: 4/22/2020  Hospital Length of Stay: 2 days  Transfer Date: 4/24/2020  Attending Physician: Kenneth Morgan MD   Primary Care Provider: Kori Ricahrdson NP  Reason for Admission:Embolic stroke of left ICA    HPI:   Ms. Clement Moore is a 79 year old lady with PMH significant for HTN, HLD who was transferred from [facility] with aphasia and RSW. Patient had initially been taken to the ED when her neighbors found her wandering around outside. She presented with right sided weakness and aphasia to the OSH ED and was evaluated via Tele stroke. Since she was last known well yesterday 04/21/20, she was not a candidate for TPA. CTA at OSH revealed L ICA occlusion and she was transferred to [facility] for possible IR intervention. Patient is primarily Chinese speaking and aphasic with no family present, Therefore all history was obtained via chart review. Patient tested negative for COVID19 and CT head on arrival to [facility] revealed ischemic changes in L insula. She was taken to IR for possible thrombectomy with etiology of stroke unclear at this time. Likely embolic, differential includes KIM vs ESUS.  Patient admitted to [facility] s/p thrombectomy for higher level of care.    Hospital Course:   04/22/20: Patient admitted to [facility] s/p thrombectomy for L ICA occlusion  04/23/20: CARLOS EDUARDO, patient globally aphasic. Patient's friend updated as no family contact is available.  04/24/20: CARLOS EDUARDO, stepdown to vascular Neurology today    * Stroke due to occlusion of left carotid artery  Teresa Taylor is a 79 year old female who was LKN on 04/21/20 found wandering around outside by neighbors today 04/22/20, taken to the ED presenting with RSW and aphasia, CTA at OSH revealed L ICA occlusion and patient was transferred to [facility] for thrombectomy. Patient admitted to [facility] s/p acute intervention. Patient  is primarily Tajik speaking and aphasic with no family present, Therefore all history was obtained via chart review. Etiology of stroke unclear at this time. Likely embolic, differential includes KIM vs ESUS.    Neuro:      Embolic stroke involving left Internal Carotid Artery  -S/p thrombectomy, 4/22 TICI 2A  -neuro checks and vital signs q30 min x 6hrs post procedure, then q1hr x 16hrs  -aspirin 325 and statin 40mg daily   -Plavix 75mg daily  -MRI noted above      Pulmonary:      Supportive oxygenation  -monitor with continuous pulse oximetry  -titrate supplemental 02 to achieve sats > 92%       Cardiac:      Hypertension  -if needed, start nicardipene gtt @5mg/hr   -goal SBP<160  - Can discontinue A-line transfer to Vascular Neurology today      Renal:       Fluid balance   -strict I/Os        ID:      COVID negative, afebrile, no Leukocytosis     Hem/Onc:      Monitor H/H closely    -aspirin 81mg daily,   -Plavix 75mg daily     Endocrine:     Follow A1C results  Glucose control  -POCT q6hrs      Fluids/Electrolytes/Nutrition/GI:    - Monitor electrolytes and replace accordingly  -Puree diet       Cytotoxic cerebral edema  Monitor closely, repeat head imaging, MRI noted above, stable    Mixed hyperlipidemia  Atorvastatin 40mg daily    Essential hypertension  Goal SBP <160      Consults (From admission, onward)        Status Ordering Provider     Inpatient consult to Physical Medicine Rehab  Once     Provider:  (Not yet assigned)    Completed JERROD HASSAN     Inpatient consult to Registered Dietitian/Nutritionist  Once     Provider:  (Not yet assigned)    Completed JERROD HASSAN     Inpatient consult to Social Work/Case Management  Once     Provider:  (Not yet assigned)    Acknowledged JERROD HASSAN     Inpatient consult to Vascular (Stroke) Neurology  Once     Provider:  (Not yet assigned)    Completed JERROD HASSAN.        * No surgery found *    Diet Orders          Diet Dysphagia Pureed  (IDDSI Level 4) [facility]; Nectar Thick: Dysphagia 1 (Pureed) starting at 04/23 0949        Activity Orders          Diet Dysphagia Pureed (IDDSI Level 4) [facility]; Nectar Thick: Dysphagia 1 (Pureed) starting at 04/23 0949        Pending Diagnostic Studies:     Procedure Component Value Units Date/Time    Fl Modified Barium Swallow Speech [[id number]] Resulted:  04/24/20 1231    Order Status:  Sent Lab Status:  In process Updated:  04/24/20 1237    IR Angiogram Carotid Cerebral Bilateral inc Arch [[id number]] Resulted:  04/22/20 1445    Order Status:  Sent Lab Status:  In process Updated:  04/22/20 1704    IR Angiogram Carotid Cerebral Bilateral inc Arch [[id number]]     Order Status:  Sent Lab Status:  No result         Bc Robles MD  [facility]

## 2020-04-24 NOTE — PT/OT/SLP PROGRESS
Please clarify tiZANidine (ZANAFLEX) 2 MG tablet directions.  How often is patient to take this?     [facility] [address]   Ph:[phone]  F:[phone]    Michelle ARIAS   Speech Language Pathology Treatment    Patient Name:  Teresa Vaughan   MRN:  [id number]  Admitting Diagnosis: Stroke due to occlusion of left carotid artery    Recommendations:                 General Recommendations:  Dysphagia therapy, Speech/language therapy and Cognitive-linguistic therapy  Diet recommendations:  Puree, Liquid Diet Level: Nectar Thick   Aspiration Precautions: Strict aspiration precautions   General Precautions: Standard, aphasia, aspiration  Communication strategies:  go to room if call light pushed and     Subjective     Session completed with Language Line,  number [id number].     Pain/Comfort:  · Pain Rating 1: 0/10  · Pain Rating Post-Intervention 1: 0/10(nad)    Objective:     Has the patient been evaluated by SLP for swallowing?   Yes  Keep patient NPO? No   Current Respiratory Status: room air      Pt seen bedside, alert with some visual attn to R slightly past midline.  Pt followed simple commands x3 given max tactile/visual cues.  No voicing to command.  No response to y/n questions.  Mumbled unintelligible verbalization x1.  SLP reviewed POC for modified barium swallow study.  No response by pt.        Assessment:     Teresa Vaughan is a 79 y.o. female with an SLP diagnosis of Aphasia, Dysphagia and Apraxia.      Goals:   Multidisciplinary Problems     SLP Goals        Problem: SLP Goal    Goal Priority Disciplines Outcome   SLP Goal     SLP Ongoing, Progressing   Description:  Speech Language Pathology Goals  Goals expected to be met by 4/30  1. Pt will tolerate puree diet with nectar thick liquids without overt s/s aspiration.   2. Pt will tolerate trials of thin liquids without overt s/s aspiration.   3. Pt will tolerate trials of solids without overt s/s aspiration.  4. Pt will answer simple y/n questions via any modality with 60% accy.  5. Pt will follow simple commands with 50% accy given max cues.   6. Pt will complete auto speech tasks with 30% accy given  max cues.                            Plan:     · Patient to be seen:  4 x/week   · Plan of Care expires:  05/23/20  · Plan of Care reviewed with:  patient   · SLP Follow-Up:  Yes       Discharge recommendations:  nursing facility, skilled   Barriers to Discharge:  Level of Skilled Assistance Needed      Time Tracking:     SLP Treatment Date:   04/24/20  Speech Start Time:  0757  Speech Stop Time:  0809     Speech Total Time (min):  12 min    Billable Minutes: Speech Therapy Individual 12    DARREN Box, CCC-SLP  04/24/2020

## 2020-04-24 NOTE — PLAN OF CARE
Problem: Physical Therapy Goal  Goal: Physical Therapy Goal  Description  Goals to be met by: 5/3     Patient will increase functional independence with mobility by performin. Supine to sit with MInimal Assistance  2. Sit to supine with MInimal Assistance  3. Sit to stand transfer with Moderate Assistance  4. Bed to chair transfer with Moderate Assistance using squat pivot technique  5. Gait  x 5 feet with Maximum Assistance using least restrictive device or no AD.    6. Sitting at edge of bed x10 minutes with Stand-by Assistance while performing a dynamic reaching task with no UE support on bed to prepare for functional activities in sitting  7. Lower extremity exercise program x15 reps per handout, with assistance as needed to improve strength and neuromuscular control to increase independence with mobility.     Outcome: Ongoing, Progressing   Continue with plan of care.   Jane Montero, PT  2020

## 2020-04-24 NOTE — PLAN OF CARE
Problem: SLP Goal  Goal: SLP Goal  Description  Speech Language Pathology Goals  Goals expected to be met by 4/30  1. Pt will tolerate puree diet with nectar thick liquids without overt s/s aspiration.   2. Pt will tolerate trials of thin liquids without overt s/s aspiration.   3. Pt will tolerate trials of solids without overt s/s aspiration.  4. Pt will answer simple y/n questions via any modality with 60% accy.  5. Pt will follow simple commands with 50% accy given max cues.   6. Pt will complete auto speech tasks with 30% accy given max cues.           4/24/2020 1350 by DARREN Box, CCC-SLP  Outcome: Ongoing, Progressing    Modified barium swallow study completed.  Rec continue puree diet with nectar thick liquids with strict aspiration precautions. DARREN Box, CCC/SLP 4/24/2020

## 2020-04-24 NOTE — SUBJECTIVE & OBJECTIVE
Admit Date: 4/22/2020  LOS: 2    CC: Stroke due to occlusion of left carotid artery    Code Status: Full Code     SUBJECTIVE:     Interval History/Significant Events: Patient has Left sided gaze, global aphasia, opassed speech and swaloow, on Puree diet, plan to stepdown to vascular Neurology today.     Review of Symptoms:   Constitutional: Negative for chills and fever.   Respiratory: Negative for cough.    Gastrointestinal: Negative for nausea and vomiting.   Neurological: Positive for facial asymmetry, speech difficulty and weakness. Negative for numbness.   Psychiatric/Behavioral: Negative for agitation.        Medications:  Continuous Infusions:  Scheduled Meds:   aspirin  81 mg Oral Daily    atorvastatin  40 mg Oral Daily    clopidogreL  75 mg Oral Daily    heparin (porcine)  5,000 Units Subcutaneous Q8H    polyethylene glycol  17 g Oral Daily    senna-docusate 8.6-50 mg  1 tablet Oral BID     PRN Meds:.acetaminophen, calcium gluconate IVPB, calcium gluconate IVPB, calcium gluconate IVPB, magnesium sulfate IVPB, magnesium sulfate IVPB, ondansetron, potassium chloride in water **AND** potassium chloride in water **AND** potassium chloride in water, promethazine (PHENERGAN) IVPB, sodium chloride 0.9%, sodium phosphate IVPB, sodium phosphate IVPB, sodium phosphate IVPB    OBJECTIVE:   Vital Signs (Most Recent):   Temp: 98.7 °F (37.1 °C) (04/24/20 0701)  Pulse: 78 (04/24/20 0801)  Resp: 19 (04/24/20 0801)  BP: (!) 122/58 (04/24/20 0801)  SpO2: 97 % (04/24/20 0801)    Vital Signs (24h Range):   Temp:  [98.6 °F (37 °C)-99.7 °F (37.6 °C)] 98.7 °F (37.1 °C)  Pulse:  [56-84] 78  Resp:  [10-31] 19  SpO2:  [92 %-100 %] 97 %  BP: (118-171)/(56-72) 122/58  Arterial Line BP: ()/(53-95) 78/58    ICP/CPP (Last 24h):        I & O (Last 24h):     Intake/Output Summary (Last 24 hours) at 4/24/2020 0936  Last data filed at 4/24/2020 0801  Gross per 24 hour   Intake 570 ml   Output 1200 ml   Net -630 ml     Physical  Exam:  GA: Alert, comfortable, no acute distress.   HEENT: No scleral icterus or JVD.   Pulmonary: Clear to auscultation A/L. No wheezing, crackles, or rhonchi.  Cardiac: RRR S1 & S2 w/o rubs/murmurs/gallops.   Abdominal: Bowel sounds present x 4. No appreciable hepatosplenomegaly.  Skin: No jaundice, rashes, or visible lesions.  Pulses: 1+ DP bilat     Neuro:  --sedation: none  --GCS: E4V2M5  --Mental Status: awake, Global aphasia, follows commands, left gaze predominance  --CN II-XII grossly intact, Facial Movement (CN VII): Lower facial weakness on the Right.   --Pupils 3-->2mm, PERRL.   --brainstem: intact  --Motor:   --Arm left  Normal 5/5  --Leg left  Normal 5/5  --Arm right  paresis 4/5  --Leg right Paresis: 4/5  --Sensation: Intact to light touch, temperature and vibration  --Tone: Normal tone throughout  --sensory: intact to soft touch and pain throughout  --Reflexes: not tested  --Gait: deferred    Vent Data:   Oxygen Concentration (%):  [24-28] 24    Lines/Drains/Airway:            Arterial Line 04/22/20 1800 Left Radial (Active)   Site Assessment Clean;Dry;Intact;No redness;No swelling 4/24/2020  7:01 AM   Line Status Pulsatile blood flow 4/24/2020  7:01 AM   Art Line Waveform Appropriate;Square wave test performed 4/24/2020  7:01 AM   Arterial Line Interventions Zeroed and calibrated;Leveled;Connections checked and tightened 4/24/2020  7:01 AM   Color/Movement/Sensation Capillary refill less than 3 sec 4/24/2020  7:01 AM   Dressing Type Biopatch in place;Transparent (Tegaderm) 4/24/2020  7:01 AM   Dressing Status Clean;Dry;Intact 4/24/2020  7:01 AM   Dressing Intervention Integrity maintained 4/24/2020  7:01 AM   Dressing Change Due 04/29/20 4/24/2020  7:01 AM      Female External Urinary Catheter 04/23/20 0200 (Active)   Skin no breakdown;no redness;perineum cleansed w/ soap and water;female external urine collection device repositioned 4/24/2020  7:01 AM   Tolerance no signs/symptoms of discomfort  4/24/2020  7:01 AM   Suction Continuous suction at 50 mmHg 4/24/2020  7:01 AM   Date of last wick change 04/24/20 4/24/2020  7:01 AM   Time of last wick change 0731 4/24/2020  7:01 AM   Output (mL) 150 mL 4/24/2020  8:01 AM     Nutrition/Tube Feeds (if NPO state why): TF     Labs:  ABG: No results for input(s): PH, PO2, PCO2, HCO3, POCSATURATED, BE in the last 24 hours.  BMP:  Recent Labs   Lab 04/24/20  0141      K 3.9      CO2 24   BUN 10   CREATININE 0.7      MG 2.1   PHOS 2.8     LFT:   Lab Results   Component Value Date    AST 24 04/24/2020    ALT 13 04/24/2020    ALKPHOS 69 04/24/2020    BILITOT 0.6 04/24/2020    ALBUMIN 3.3 (L) 04/24/2020    PROT 6.4 04/24/2020     CBC:   Lab Results   Component Value Date    WBC 9.49 04/24/2020    HGB 10.7 (L) 04/24/2020    HCT 33.3 (L) 04/24/2020    MCV 91 04/24/2020     04/24/2020     Microbiology x 7d:   Microbiology Results (last 7 days)     ** No results found for the last 168 hours. **        Imaging: MRI 04/22/20  Impression       Prominent areas of acute ischemia/infarct involving the left cerebral hemisphere as detailed above with additional focal areas involving the left cerebellum and a focus of the right cerebellum.    Focal area of signal hypointensity of the left thalamus may relate to an area of focal hemorrhagic conversion a large degree of hemorrhagic conversion is not appreciated.    Signal abnormality of the left internal carotid artery likely relates to diminished flow or potentially lack of flow, correlation with the recent angiographic procedure is recommended.    This report was flagged in Epic as abnormal.         I personally reviewed the above image.    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

## 2020-04-24 NOTE — HOSPITAL COURSE
Teresa Vaughan is a 79 y.o. female with PMHx of HTN and HLD who presented to [facility] with aphasia and RSW. Patient went to ED after being found outside wandering around by her neighbors. She was seen via telemedicine and was not a candidate for tPA . CTA at OSH revealed L ICA occlusion. Patient transferred to [facility] for possible IR intervention. Angioplasty and Aspiration thrombectomy completed at that time. CTA head and neck was repeated 4/25 demonstrating re-occlusion at the L carotid bifurcation as well as small degree of petechial hemorrhage. Etiology suspected to be ESUS. A cardiac event monitor was ordered following SNF discharged. Her R ICA was noted to have approx. 70% stenosis. Ambulatory referral to Vascular Surgery was placed at discharge. Patient was started on DAPT + Atorvastatin 40 mg for secondary stroke prevention. Therapy teams recommended SNF. For detailed hospital course please see below.     4/25: Patient stepped down to stroke primary service. CTA head and neck complete- L ICA re occluded but reconstitutes distally. Neuro exam unchanged.   4/26: Neuro exam stable. Stroke etiology likely ESUS. Patient to discharge with 30 day event monitor.  4/27: Patient remains stable; monitoring BP. Pending SNF placement. Labs qOD.  4/28: Diet had been upgraded yesterday however per SLP this AM, pt not tolerating well; changed diet back to puree with nectar-thick liquids. Neuro exam remains stable. CM/SW continuing efforts toward placement.   4/29: arranging placement at SNF, patient medically stable for discharge  4/30: NAEON. Patient to discharge to SNF today.

## 2020-04-24 NOTE — HPI
Teresa Vaughan is a 79-year-old female with PMHx of  HTN and HLD. Patient presented to OSH for R sided weakness. CTA at OSH revealed L ICA occlusion and she was transferred to [facility] for possible IR intervention on 4/22. Patient went to IR for angioplasty and aspiration thrombectomy. MRI demonstrated involvment of L MCA, L PCA and L cerebellar aa with a R cerebellar finding as well. [facility] Neurology rec CTA pending. Hospital course complicated by HTN (cardene gtt stopped 4/23)    Functional History: Patient lives alone in a H, ramp to enter. Prior to admission, (I). DME: none.

## 2020-04-24 NOTE — PLAN OF CARE
Problem: Occupational Therapy Goal  Goal: Occupational Therapy Goal  Description  Goals set on 4/23 with expiration date 5/8:  Patient will increase functional independence with ADLs by performing:    Supine <> Sit with Min Assistance.  Feeding with Min  Assistance.  Grooming while standing at sink with Min  Assistance.  UB Dressing with Min  Assistance.  LB Dressing with Min Assistance.  Step transfer with Min Assistance with DME as needed.  Pt will demonstrate understanding of education provided regarding energy conservation and task modification through teach-back method.   Patient will increase functional independence with ADLs by performing:  Patient will demonstrate assistance as needed with HEP for R UE weight bearing.      Patient's family / caregiver will demonstrate assistance as needed and safety with assisting patient with self-care skills and functional mobility.      Patient's family / caregiver will demonstrate assistance as needed with HEP, A/AAROM and changes in bed positioning.        Outcome: Ongoing, Progressing      Pt progressing towards goals. Cont OT POC  CHANDRA Siddiqi  04/24/2020

## 2020-04-24 NOTE — SUBJECTIVE & OBJECTIVE
Past Medical History:   Diagnosis Date    Essential hypertension 4/22/2020    Mixed hyperlipidemia 4/22/2020    Stroke due to occlusion of left carotid artery 4/22/2020     History reviewed. No pertinent surgical history.  Review of patient's allergies indicates:  No Known Allergies    Scheduled Medications:    aspirin  81 mg Oral Daily    atorvastatin  40 mg Oral Daily    clopidogreL  75 mg Oral Daily    heparin (porcine)  5,000 Units Subcutaneous Q8H    polyethylene glycol  17 g Oral Daily    senna-docusate 8.6-50 mg  1 tablet Oral BID       PRN Medications: acetaminophen, calcium gluconate IVPB, calcium gluconate IVPB, calcium gluconate IVPB, magnesium sulfate IVPB, magnesium sulfate IVPB, ondansetron, potassium chloride in water **AND** potassium chloride in water **AND** potassium chloride in water, promethazine (PHENERGAN) IVPB, sodium chloride 0.9%, sodium phosphate IVPB, sodium phosphate IVPB, sodium phosphate IVPB    Family History     None        Tobacco Use    Smoking status: Unknown If Ever Smoked   Substance and Sexual Activity    Alcohol use: Not on file    Drug use: Not on file    Sexual activity: Not on file     Review of Systems   Reason unable to perform ROS: Aphasia.   Constitutional: Positive for activity change.   Neurological: Positive for weakness.     Objective:     Vital Signs (Most Recent):  Temp: 98.7 °F (37.1 °C) (04/24/20 0701)  Pulse: 67 (04/24/20 1101)  Resp: 20 (04/24/20 1101)  BP: 130/64 (04/24/20 1101)  SpO2: 99 % (04/24/20 1101)    Vital Signs (24h Range):  Temp:  [98.6 °F (37 °C)-99.7 °F (37.6 °C)] 98.7 °F (37.1 °C)  Pulse:  [56-84] 67  Resp:  [10-31] 20  SpO2:  [94 %-100 %] 99 %  BP: (118-171)/(56-72) 130/64  Arterial Line BP: ()/(53-95) 78/58     Body mass index is 22.26 kg/m².    Physical Exam   Constitutional: She appears well-developed and well-nourished.   HENT:   Head: Normocephalic and atraumatic.   Eyes: Pupils are equal, round, and reactive to light.  EOM are normal.   Neck: Normal range of motion. Neck supple.   Pulmonary/Chest: Effort normal. No respiratory distress.   Abdominal: Normal appearance.   Musculoskeletal: She exhibits no tenderness or deformity.   Neurological: She is alert.   - L gaze   - Aphasia  - not following commands   Skin: Skin is warm and dry.   Psychiatric: She has a normal mood and affect.   Nursing note and vitals reviewed.    NEUROLOGICAL EXAMINATION:     CRANIAL NERVES     CN III, IV, VI   Pupils are equal, round, and reactive to light.  Extraocular motions are normal.       Diagnostic Results: Labs: Reviewed  ECG: Reviewed  CT: Reviewed

## 2020-04-24 NOTE — ASSESSMENT & PLAN NOTE
Teresa Taylor is a 79 year old female who was LKN on 04/21/20 found wandering around outside by neighbors today 04/22/20, taken to the ED presenting with RSW and aphasia, CTA at OSH revealed L ICA occlusion and patient was transferred to [facility] for thrombectomy. Patient admitted to [facility] s/p acute intervention. Patient is primarily Macanese speaking and aphasic with no family present, Therefore all history was obtained via chart review. Etiology of stroke unclear at this time. Likely embolic, differential includes KIM vs ESUS.    Neuro:      Embolic stroke involving left Internal Carotid Artery  -S/p thrombectomy, 4/22 TICI 2A  -neuro checks and vital signs q30 min x 6hrs post procedure, then q1hr x 16hrs  -aspirin 325 and statin 40mg daily   -Plavix 75mg daily  -MRI noted above      Pulmonary:      Supportive oxygenation  -monitor with continuous pulse oximetry  -titrate supplemental 02 to achieve sats > 92%       Cardiac:      Hypertension  -if needed, start nicardipene gtt @5mg/hr   -goal SBP<160  - Can discontinue A-line transfer to Vascular Neurology today      Renal:       Fluid balance   -strict I/Os        ID:      COVID negative, afebrile, no Leukocytosis     Hem/Onc:      Monitor H/H closely    -aspirin 81mg daily,   -Plavix 75mg daily     Endocrine:     Follow A1C results  Glucose control  -POCT q6hrs      Fluids/Electrolytes/Nutrition/GI:    - Monitor electrolytes and replace accordingly  -Puree diet

## 2020-04-24 NOTE — PLAN OF CARE
Returned call to patient's friend/caregiver, Nedra Hyman ([phone]).   Informed Mr. Hyman that a referral for rehab placement has been initiated pending authorization.

## 2020-04-24 NOTE — PLAN OF CARE
Spoke with Nedra Hyman  (friend) [phone] regarding recommendations for skilled therapy versus rehab. States he does not have a preference but would like her to be close to the [facility]. Referral blast to the [facility].      04/24/20 1610   Post-Acute Status   Post-Acute Authorization Placement;Other   Post-Acute Placement Status Referrals Sent   Kathryn Horowitz, MSN  Case Management  Ext [phone]

## 2020-04-24 NOTE — PLAN OF CARE
POC reviewed with pt. Questions and concerns addressed. No acute events overnight. Pt progressing toward goals. Will continue to monitor. See flowsheets for full assessment and VS info

## 2020-04-24 NOTE — PLAN OF CARE
Problem: SLP Goal  Goal: SLP Goal  Description  Speech Language Pathology Goals  Goals expected to be met by 4/30  1. Pt will tolerate puree diet with nectar thick liquids without overt s/s aspiration.   2. Pt will tolerate trials of thin liquids without overt s/s aspiration.   3. Pt will tolerate trials of solids without overt s/s aspiration.  4. Pt will answer simple y/n questions via any modality with 60% accy.  5. Pt will follow simple commands with 50% accy given max cues.   6. Pt will complete auto speech tasks with 30% accy given max cues.      Outcome: Ongoing, Progressing    Pt seen this am for aphasia tx.  Pt remains mostly nonverbal with significant comprehension deficits. DARREN Box, CCC/SLP  4/24/2020

## 2020-04-24 NOTE — ASSESSMENT & PLAN NOTE
- went to IR for angioplasty and aspiration thrombectomy.   - MRI demonstrated involvment of L MCA, L PCA and L cerebellar aa with a R cerebellar finding as well.   - [facility] Neurology rec CTA pending.    - Related to prolonged/acute hospital course.     Recommendations  -  Encourage mobility, OOB in chair at least 3 hours per day, and early ambulation as appropriate  -  PT/OT evaluate and treat  -  Pain management  -  Monitor for and prevent skin breakdown and pressure ulcers  · Early mobility, repositioning/weight shifting every 20-30 minutes when sitting, turn patient every 2 hours, proper mattress/overlay and chair cushioning, pressure relief/heel protector boots  -  DVT prophylaxis    -  Reviewed discharge options (IP rehab, SNF, HH therapy, and OP therapy)

## 2020-04-24 NOTE — SUBJECTIVE & OBJECTIVE
Subjective:     Interval History: No acute events overnight. Patient still globally aphasic and not following commands. Otherwise no new or worsening symptoms. CTA still recommended. Would likely beenfit from event monitor.    Review of Systems   Constitutional: Negative for chills and fever.   Respiratory: Negative for cough.    Gastrointestinal: Negative for nausea and vomiting.   Neurological: Positive for facial asymmetry, speech difficulty and weakness. Negative for numbness.   Psychiatric/Behavioral: Negative for agitation.     Objective:     Vital Signs (Most Recent):  Temp: 98.7 °F (37.1 °C) (04/24/20 0701)  Pulse: 62 (04/24/20 1001)  Resp: 13 (04/24/20 1001)  BP: (!) 144/65 (04/24/20 1001)  SpO2: 95 % (04/24/20 1001)    Vital Signs Range (Last 24H):  Temp:  [98.6 °F (37 °C)-99.7 °F (37.6 °C)]   Pulse:  [56-84]   Resp:  [10-31]   BP: (118-171)/(56-72)   SpO2:  [94 %-100 %]   Arterial Line BP: ()/(53-95)     Physical Exam   Constitutional: She appears well-developed and well-nourished. No distress.   HENT:   Head: Normocephalic and atraumatic.   Eyes: EOM are normal.   Cardiovascular: Normal rate.   Pulmonary/Chest: Effort normal. No respiratory distress.   Neurological: She is alert.   Skin: Skin is warm and dry.   Vitals reviewed.      Neurological Exam:   LOC: alert  Attention Span: poor  Language: Global aphasia  Articulation: Untestable due to severe aphasia   Orientation: Untestable due to severe aphasia   Visual Fields: Full  EOM (CN III, IV, VI): Full/intact  Facial Movement (CN VII): Lower facial weakness on the Right  Motor: Arm left  Normal 5/5  Leg left  Normal 5/5  Arm right  Normal 5/5  Leg right Paresis: 4/5  Sensation: Intact to light touch, temperature and vibration  Tone: Normal tone throughout      Laboratory:  CMP:   Recent Labs   Lab 04/24/20  0141   CALCIUM 8.3*   ALBUMIN 3.3*   PROT 6.4      K 3.9   CO2 24      BUN 10   CREATININE 0.7   ALKPHOS 69   ALT 13   AST 24    BILITOT 0.6     CBC:   Recent Labs   Lab 04/24/20  0141   WBC 9.49   RBC 3.67*   HGB 10.7*   HCT 33.3*      MCV 91   MCH 29.2   MCHC 32.1     Lipid Panel:   Recent Labs   Lab 04/22/20  1720   CHOL 220*   LDLCALC 135.2   HDL 61   TRIG 119     Coagulation: No results for input(s): PT, INR, APTT in the last 168 hours.  Hgb A1C:   Recent Labs   Lab 04/22/20  1720   HGBA1C 5.6     TSH:   Recent Labs   Lab 04/22/20  1720   TSH 3.990       Diagnostic Results:      Brain imaging:  CT Head. Date: 04/22/20  Ill-defined region of decreased attenuation left insula anteriorly concerning for acute/recent infarction.  No significant mass effect or evidence for hemorrhagic conversion.    Vessel Imaging:  CTA OSH. Date: 04/22/20  L ICA occlusion

## 2020-04-24 NOTE — NURSING TRANSFER
Nursing Transfer Note      4/24/2020     Transfer To:  From: [facility] [id number]    Transfer via bed    Transfer with personal belongings    Transported by primary RN and PCT    Medicines sent: n/a    Chart send with patient: yes    Notified: friend (Nedra)    Patient reassessed at: 1800 4/24/20    Upon arrival to floor: pt oriented to room, bed locked and lowest position, purewick connected to wall suction, bedside handoff to new primary RNLori

## 2020-04-24 NOTE — PROCEDURES
Modified Barium Swallow    Patient Name:  Teresa Vaughan   MRN:  [id number]      Recommendations:     Recommendations:                General Recommendations:  Dysphagia therapy, Speech/language therapy and Cognitive-linguistic therapy  Diet recommendations:  Puree, Nectar Thick   Aspiration Precautions: 1 bite/sip at a time, Assistance with meals and Assistance with thickening liquids, Check for pocketing/oral residue, Double swallow with each bite/sip, Eliminate distractions, Feed only when awake/alert, HOB to 90 degrees, Monitor for s/s of aspiration, Small bites/sips and Strict aspiration precautions   General Precautions: Standard, aspiration, aphasia, fall, pureed diet, nectar thick  Communication strategies:  provide increased time to answer and go to room if call light pushed    Referral     Reason for Referral  Patient was referred for a Modified Barium Swallow Study to assess the efficiency of his/her swallow function, rule out aspiration and make recommendations regarding safe dietary consistencies, effective compensatory strategies, and safe eating environment.     Diagnosis: Stroke due to occlusion of left carotid artery       History:     Past Medical History:   Diagnosis Date    Essential hypertension 4/22/2020    Mixed hyperlipidemia 4/22/2020    Stroke due to occlusion of left carotid artery 4/22/2020       Objective:     Current Respiratory Status: 04/24/20    Alert: yes    Cooperative: yes    Follows Directions: no, aphasia/Polish speaking    Visualization  · Patient was seen in the lateral view    Oral Peripheral Examination  · Oral Musculature: unable to assess due to poor participation/comprehension(R facial droop)  · Dentition: present and adequate  · Secretion Management: adequate  · Oral Labial Strength and Mobility: impaired seal  · Buccal Strength and Mobility: decreased tone  · Volitional Cough: not elicited  · Volitional Swallow: not elicited  · Voice Prior to PO Intake: low intensity,  clear    Consistencies Assessed  · Thin via tsp x2, cup x1, straw x2  · Nectar thick tsp x2, cup x1, straw x1  · Puree tsp x2  · Solids cracker x1    Oral Preparation/Oral Phase  · Anterior loss of liquid and solid bolus  · Prolonged A-P transfer  · Oral residue present post swallow  · Decreased base of tongue mobility  · Premature spillage  · Expectoration of solid bolus, no mastication     Pharyngeal Phase   · Delayed initiation of pharyngeal swallow   · Premature spillage to pyriform sinuses with all liquids, valleculae with puree   · Decreased BOT retraction-mild  · Mildly decreased hyolaryngeal elevation and excursion  · Mildly decreased epiglottic inversion   · Flash penetration of large cyclical sips of nectar thick liquids  · Penetration of thin liquids via cup and straw. Increased risk 2/2 significant delay  · Pt unable to follow commands for compensatory strategies     Cervical Esophageal Phase  · UES appeared to accommodate all bolus types without stasis or retrograde movement observed     Assessment:     Impressions  ·   Patient demonstrates moderate  Oropharyngeal  dysphagia characterized by delayed initiation pf pharyngeal swallow with penetration of liquids.  Decreased bolus formation with anterior loss and mild stasis which clears with multiple swallows.     Prognosis: Fair    Barriers:  · Cognitive status  · Dependent feeding  · Severe aphasia    Plan  Continue puree diet with nectar thick liquids  Trials soft solids and thin liquids with SLP     Education  Results were discussed with patient's nurse.  Education will e ongoing with pt 2/2 language barrier and aphasia.     Goals:   Multidisciplinary Problems     SLP Goals        Problem: SLP Goal    Goal Priority Disciplines Outcome   SLP Goal     SLP Ongoing, Progressing   Description:  Speech Language Pathology Goals  Goals expected to be met by 4/30  1. Pt will tolerate puree diet with nectar thick liquids without overt s/s aspiration.   2. Pt  will tolerate trials of thin liquids without overt s/s aspiration.   3. Pt will tolerate trials of solids without overt s/s aspiration.  4. Pt will answer simple y/n questions via any modality with 60% accy.  5. Pt will follow simple commands with 50% accy given max cues.   6. Pt will complete auto speech tasks with 30% accy given max cues.                            Plan:   · Patient to be seen:  Therapy Frequency: 4 x/week   · Plan of Care expires:  05/23/20  · Plan of Care reviewed with:  patient        Discharge recommendations:  nursing facility, skilled   Barriers to Discharge:  Level of Skilled Assistance Needed      Time Tracking:   SLP Treatment Date:   04/24/20  Speech Start Time:  1145  Speech Stop Time:  1158     Speech Total Time (min):  13 min    DARREN Box, CCC-SLP  04/24/2020

## 2020-04-24 NOTE — HOSPITAL COURSE
4/23/20: Evaluated by PT & OT. Bed mobility modA- maxA x 2. Sit to stand maxA. Grooming maxA. LBD totalA.   4/24/20: Participated w/ PT & OT. Bed mobility totalA- totalA x 2 ppl. Grooming totalA.   4/27/20: Participated w/ PT. Bed mobility totalA- totalA x 2 ppl. Sit to stand totalA x 2ppl.

## 2020-04-24 NOTE — NURSING
POC reviewed with pt and friend via phone at 1700 . Pt aphasic and unable to verbalize understanding. Friend verbalized understanding and notified of transfer to . Questions and concerns addressed. No acute events today. Barium swallow completed without complication. Pt progressing toward goals. Will continue to monitor. See flowsheets for full assessment and VS info

## 2020-04-24 NOTE — PROGRESS NOTES
[facility]  Neurocritical Care  Progress Note    Admit Date: 4/22/2020  Service Date: 04/24/2020  Length of Stay: 2    Subjective:     Chief Complaint: Stroke due to occlusion of left carotid artery    History of Present Illness: Ms. Clement Moore is a 79 year old lady with PMH significant for HTN, HLD who was transferred from [facility] with aphasia and RSW. Patient had initially been taken to the ED when her neighbors found her wandering around outside. She presented with right sided weakness and aphasia to the OSH ED and was evaluated via Tele stroke. Since she was last known well yesterday 04/21/20, she was not a candidate for TPA. CTA at OSH revealed L ICA occlusion and she was transferred to [facility] for possible IR intervention. Patient is primarily Slovak speaking and aphasic with no family present, Therefore all history was obtained via chart review. Patient tested negative for COVID19 and CT head on arrival to [facility] revealed ischemic changes in L insula. She was taken to IR for possible thrombectomy with etiology of stroke unclear at this time. Likely embolic, differential includes KIM vs ESUS.  Patient admitted to [facility] s/p thrombectomy for higher level of care.    Hospital Course: 04/22/20: Patient admitted to [facility] s/p thrombectomy for L ICA occlusion  04/23/20: CARLOS EDUARDO, patient globally aphasic. Patient's friend updated as no family contact is available.  04/24/20: CARLOS EDUARDO, stepdown to vascular Neurology today    Admit Date: 4/22/2020  LOS: 2    CC: Stroke due to occlusion of left carotid artery    Code Status: Full Code     SUBJECTIVE:     Interval History/Significant Events: Patient has Left sided gaze, global aphasia, opassed speech and swaloow, on Puree diet, plan to stepdown to vascular Neurology today.     Review of Symptoms:   Constitutional: Negative for chills and fever.   Respiratory: Negative for cough.    Gastrointestinal: Negative for nausea and vomiting.    Neurological: Positive for facial asymmetry, speech difficulty and weakness. Negative for numbness.   Psychiatric/Behavioral: Negative for agitation.        Medications:  Continuous Infusions:  Scheduled Meds:   aspirin  81 mg Oral Daily    atorvastatin  40 mg Oral Daily    clopidogreL  75 mg Oral Daily    heparin (porcine)  5,000 Units Subcutaneous Q8H    polyethylene glycol  17 g Oral Daily    senna-docusate 8.6-50 mg  1 tablet Oral BID     PRN Meds:.acetaminophen, calcium gluconate IVPB, calcium gluconate IVPB, calcium gluconate IVPB, magnesium sulfate IVPB, magnesium sulfate IVPB, ondansetron, potassium chloride in water **AND** potassium chloride in water **AND** potassium chloride in water, promethazine (PHENERGAN) IVPB, sodium chloride 0.9%, sodium phosphate IVPB, sodium phosphate IVPB, sodium phosphate IVPB    OBJECTIVE:   Vital Signs (Most Recent):   Temp: 98.7 °F (37.1 °C) (04/24/20 0701)  Pulse: 78 (04/24/20 0801)  Resp: 19 (04/24/20 0801)  BP: (!) 122/58 (04/24/20 0801)  SpO2: 97 % (04/24/20 0801)    Vital Signs (24h Range):   Temp:  [98.6 °F (37 °C)-99.7 °F (37.6 °C)] 98.7 °F (37.1 °C)  Pulse:  [56-84] 78  Resp:  [10-31] 19  SpO2:  [92 %-100 %] 97 %  BP: (118-171)/(56-72) 122/58  Arterial Line BP: ()/(53-95) 78/58    ICP/CPP (Last 24h):        I & O (Last 24h):     Intake/Output Summary (Last 24 hours) at 4/24/2020 0936  Last data filed at 4/24/2020 0801  Gross per 24 hour   Intake 570 ml   Output 1200 ml   Net -630 ml     Physical Exam:  GA: Alert, comfortable, no acute distress.   HEENT: No scleral icterus or JVD.   Pulmonary: Clear to auscultation A/L. No wheezing, crackles, or rhonchi.  Cardiac: RRR S1 & S2 w/o rubs/murmurs/gallops.   Abdominal: Bowel sounds present x 4. No appreciable hepatosplenomegaly.  Skin: No jaundice, rashes, or visible lesions.  Pulses: 1+ DP bilat     Neuro:  --sedation: none  --GCS: E4V2M5  --Mental Status: awake, Global aphasia, follows commands, left gaze  predominance  --CN II-XII grossly intact, Facial Movement (CN VII): Lower facial weakness on the Right.   --Pupils 3-->2mm, PERRL.   --brainstem: intact  --Motor:   --Arm left  Normal 5/5  --Leg left  Normal 5/5  --Arm right  paresis 4/5  --Leg right Paresis: 4/5  --Sensation: Intact to light touch, temperature and vibration  --Tone: Normal tone throughout  --sensory: intact to soft touch and pain throughout  --Reflexes: not tested  --Gait: deferred    Vent Data:   Oxygen Concentration (%):  [24-28] 24    Lines/Drains/Airway:            Arterial Line 04/22/20 1800 Left Radial (Active)   Site Assessment Clean;Dry;Intact;No redness;No swelling 4/24/2020  7:01 AM   Line Status Pulsatile blood flow 4/24/2020  7:01 AM   Art Line Waveform Appropriate;Square wave test performed 4/24/2020  7:01 AM   Arterial Line Interventions Zeroed and calibrated;Leveled;Connections checked and tightened 4/24/2020  7:01 AM   Color/Movement/Sensation Capillary refill less than 3 sec 4/24/2020  7:01 AM   Dressing Type Biopatch in place;Transparent (Tegaderm) 4/24/2020  7:01 AM   Dressing Status Clean;Dry;Intact 4/24/2020  7:01 AM   Dressing Intervention Integrity maintained 4/24/2020  7:01 AM   Dressing Change Due 04/29/20 4/24/2020  7:01 AM      Female External Urinary Catheter 04/23/20 0200 (Active)   Skin no breakdown;no redness;perineum cleansed w/ soap and water;female external urine collection device repositioned 4/24/2020  7:01 AM   Tolerance no signs/symptoms of discomfort 4/24/2020  7:01 AM   Suction Continuous suction at 50 mmHg 4/24/2020  7:01 AM   Date of last wick change 04/24/20 4/24/2020  7:01 AM   Time of last wick change 0731 4/24/2020  7:01 AM   Output (mL) 150 mL 4/24/2020  8:01 AM     Nutrition/Tube Feeds (if NPO state why): TF     Labs:  ABG: No results for input(s): PH, PO2, PCO2, HCO3, POCSATURATED, BE in the last 24 hours.  BMP:  Recent Labs   Lab 04/24/20  0141      K 3.9      CO2 24   BUN 10    CREATININE 0.7      MG 2.1   PHOS 2.8     LFT:   Lab Results   Component Value Date    AST 24 04/24/2020    ALT 13 04/24/2020    ALKPHOS 69 04/24/2020    BILITOT 0.6 04/24/2020    ALBUMIN 3.3 (L) 04/24/2020    PROT 6.4 04/24/2020     CBC:   Lab Results   Component Value Date    WBC 9.49 04/24/2020    HGB 10.7 (L) 04/24/2020    HCT 33.3 (L) 04/24/2020    MCV 91 04/24/2020     04/24/2020     Microbiology x 7d:   Microbiology Results (last 7 days)     ** No results found for the last 168 hours. **        Imaging: MRI 04/22/20  Impression       Prominent areas of acute ischemia/infarct involving the left cerebral hemisphere as detailed above with additional focal areas involving the left cerebellum and a focus of the right cerebellum.    Focal area of signal hypointensity of the left thalamus may relate to an area of focal hemorrhagic conversion a large degree of hemorrhagic conversion is not appreciated.    Signal abnormality of the left internal carotid artery likely relates to diminished flow or potentially lack of flow, correlation with the recent angiographic procedure is recommended.    This report was flagged in Epic as abnormal.         I personally reviewed the above image.    ASSESSMENT/PLAN:     Active Hospital Problems    Diagnosis    *Stroke due to occlusion of left carotid artery    Essential hypertension    Mixed hyperlipidemia    Cytotoxic cerebral edema        Uninterrupted Critical Care/Counseling Time (not including procedures): 33 mins    Assessment/Plan:     Neuro  * Stroke due to occlusion of left carotid artery  Teresa Taylor is a 79 year old female who was LKN on 04/21/20 found wandering around outside by neighbors today 04/22/20, taken to the ED presenting with RSW and aphasia, CTA at OSH revealed L ICA occlusion and patient was transferred to [facility] for thrombectomy. Patient admitted to [facility] s/p acute intervention. Patient is primarily Slovenian speaking and  aphasic with no family present, Therefore all history was obtained via chart review. Etiology of stroke unclear at this time. Likely embolic, differential includes KIM vs ESUS.    Neuro:      Embolic stroke involving left Internal Carotid Artery  -S/p thrombectomy, 4/22 TICI 2A  -neuro checks and vital signs q30 min x 6hrs post procedure, then q1hr x 16hrs  -aspirin 325 and statin 40mg daily   -Plavix 75mg daily  -MRI noted above      Pulmonary:      Supportive oxygenation  -monitor with continuous pulse oximetry  -titrate supplemental 02 to achieve sats > 92%       Cardiac:      Hypertension  -if needed, start nicardipene gtt @5mg/hr   -goal SBP<160  - Can discontinue A-line transfer to Vascular Neurology today      Renal:       Fluid balance   -strict I/Os        ID:      COVID negative, afebrile, no Leukocytosis     Hem/Onc:      Monitor H/H closely    -aspirin 81mg daily,   -Plavix 75mg daily     Endocrine:     Follow A1C results  Glucose control  -POCT q6hrs      Fluids/Electrolytes/Nutrition/GI:    - Monitor electrolytes and replace accordingly  -Puree diet       Cytotoxic cerebral edema  Monitor closely, repeat head imaging, MRI noted above, stable    Cardiac/Vascular  Mixed hyperlipidemia  Atorvastatin 40mg daily    Essential hypertension  Goal SBP <160          The patient is being Prophylaxed for:  Venous Thromboembolism with: Mechanical or Chemical  Stress Ulcer with: Not Applicable   Ventilator Pneumonia with: not applicable    Activity Orders          Diet Dysphagia Pureed (IDDSI Level 4) [facility]; Nectar Thick: Dysphagia 1 (Pureed) starting at 04/23 0949        Full Code    Bc Robles MD  Neurocritical Care Fellow  Neurocritical Care  [facility]

## 2020-04-24 NOTE — PROGRESS NOTES
[facility]  Vascular Neurology  Comprehensive Stroke Center  Progress Note    Assessment/Plan:     * Stroke due to occlusion of left carotid artery  Teresa Vaughan is a 79 y.o. female with PMHx of HTN and HLD who presented to OSH with RSW and aphasia. She was seen via telemedicine and not eligible for tPA (last known normal yesterday). CTA obtained at OSH with L ICA occlusion and patient transferred [place] [facility] [place] intervention. CT head on arrival to [facility] with ischemic changes in L insula. Patient went to IR for angioplasty and aspiration thrombectomy. MRI demonstrated involvment of L MCA, L PCA and L cerebellar aa with a R cerebellar finding as well. Etiology most likely embolic in nature v possible KIM. TTE demonstrated EF 70% with no other significant findings. Patient has already tested negative for COVID    Likely embolic in nature    Recommend obtaining CTA due to involvement of several aa, thereby possibly implicating vasculitis  Patient would likely benefit from an event monitor  Patient to be stepped down to primary stroke service on 4/24    Antithrombotics for secondary stroke prevention: Antiplatelets: Aspirin: 81 mg daily, Plavix 75    Statins for secondary stroke prevention and hyperlipidemia, if present:   Statins: Atorvastatin- 40 mg daily    Aggressive risk factor modification: HTN, HLD     Rehab efforts: The patient has been evaluated by a stroke team provider and the therapy needs have been fully considered based off the presenting complaints and exam findings. The following therapy evaluations are needed: PT evaluate and treat, OT evaluate and treat, SLP evaluate and treat, PM&R evaluate for appropriate placement    Diagnostics ordered/pending: Recommend obtaining CTA    VTE prophylaxis: Heparin 5000 units SQ every 8 hours    BP parameters: Infarct: Post sucessful thrombectomy, SBP <140          Cytotoxic cerebral edema  Area of cytotoxic cerebral edema identified when  reviewing brain imaging in the territory of the L internal carotid artery. There is no mass effect associated with it. We will continue to monitor the patients clinical exam for any worsening of symptoms which may indicate expansion of the stroke or the area of the edema resulting in the clinical change. The pattern is suggestive of KIM vs ESUS etiology.        Mixed hyperlipidemia  Stroke risk factor  Lipid panel pending  Home medications unknown  Recommend starting atorvastatin 40 mg daily    Essential hypertension  Stroke risk factor  SBP <220 for unsuccessful thrombectomy  SBP <140 for successful thrombectomy         Teresa Vaughan is a 79 y.o. female with PMHx of HTN and HLD who presented to [facility] with aphasia and RSW. Patient went to ED after being found outside wandering around by her neighbors. She was seen via telemedicine and was not a candidate for tPA (last known normal yesterday). CTA at OSH revealed L ICA occlusion. Patient transferred to [facility] for possible IR intervention. Angioplasty and Aspiration thrombectomy completed at that time. Patient primarily Romanian speaking and aphasic. She does not follow commands. CTA recommended but not yet ordered. Patient likely benefit from an event monitor.     STROKE DOCUMENTATION   Acute Stroke Times   Last Known Normal Date: 04/21/20  Last Known Normal Time: 1800  Symptom Onset Date: (unknown)  Symptom Onset Time: (unknown)  Stroke Team Called Date: 04/22/20  Stroke Team Called Time: 1415  Stroke Team Arrival Date: 04/22/20  Stroke Team Arrival Time: 1415  CT Interpretation Time: 1425  Decision to Treat Time for Alteplase: (decision made at OSH via telemedicine)  Decision to Treat Time for IR: 1425    NIH Scale:  1a. Level of Consciousness: 0-->Alert, keenly responsive  1b. LOC Questions: 2-->Answers neither question correctly  1c. LOC Commands: 2-->Performs neither task correctly  2. Best Gaze: 0-->Normal  3. Visual: 0-->No visual loss  4.  Facial Palsy: 0-->Normal symmetrical movements  5a. Motor Arm, Left: 3-->No effort against gravity, limb falls  5b. Motor Arm, Right: 3-->No effort against gravity, limb falls  6a. Motor Leg, Left: 3-->No effort against gravity, leg falls to bed immediately  6b. Motor Leg, Right: 3-->No effort against gravity, leg falls to bed immediately  7. Limb Ataxia: 2-->Present in two limbs  8. Sensory: 0-->Normal, no sensory loss  9. Best Language: 3-->Mute, global aphasia, no usable speech or auditory comprehension  10. Dysarthria: 0-->Normal  11. Extinction and Inattention (formerly Neglect): 0-->No abnormality  Total (NIH Stroke Scale): 21       Modified [place] Coma Scale:    ABCD2 Score:    [id number] Score:   HAS -BLED Score:   ICH Score:   Hunt & Dahl Classification:      Hemorrhagic change of an Ischemic Stroke: Does this patient have an ischemic stroke with hemorrhagic changes? No         Subjective:     Interval History: No acute events overnight. Patient still globally aphasic and not following commands. Otherwise no new or worsening symptoms. CTA still recommended. Would likely beenfit from event monitor.    Review of Systems   Constitutional: Negative for chills and fever.   Respiratory: Negative for cough.    Gastrointestinal: Negative for nausea and vomiting.   Neurological: Positive for facial asymmetry, speech difficulty and weakness. Negative for numbness.   Psychiatric/Behavioral: Negative for agitation.     Objective:     Vital Signs (Most Recent):  Temp: 98.7 °F (37.1 °C) (04/24/20 0701)  Pulse: 62 (04/24/20 1001)  Resp: 13 (04/24/20 1001)  BP: (!) 144/65 (04/24/20 1001)  SpO2: 95 % (04/24/20 1001)    Vital Signs Range (Last 24H):  Temp:  [98.6 °F (37 °C)-99.7 °F (37.6 °C)]   Pulse:  [56-84]   Resp:  [10-31]   BP: (118-171)/(56-72)   SpO2:  [94 %-100 %]   Arterial Line BP: ()/(53-95)     Physical Exam   Constitutional: She appears well-developed and well-nourished. No distress.   HENT:    Head: Normocephalic and atraumatic.   Eyes: EOM are normal.   Cardiovascular: Normal rate.   Pulmonary/Chest: Effort normal. No respiratory distress.   Neurological: She is alert.   Skin: Skin is warm and dry.   Vitals reviewed.      Neurological Exam:   LOC: alert  Attention Span: poor  Language: Global aphasia  Articulation: Untestable due to severe aphasia   Orientation: Untestable due to severe aphasia   Visual Fields: Full  EOM (CN III, IV, VI): Full/intact  Facial Movement (CN VII): Lower facial weakness on the Right  Motor: Arm left  Normal 5/5  Leg left  Normal 5/5  Arm right  Normal 5/5  Leg right Paresis: 4/5  Sensation: Intact to light touch, temperature and vibration  Tone: Normal tone throughout      Laboratory:  CMP:   Recent Labs   Lab 04/24/20  0141   CALCIUM 8.3*   ALBUMIN 3.3*   PROT 6.4      K 3.9   CO2 24      BUN 10   CREATININE 0.7   ALKPHOS 69   ALT 13   AST 24   BILITOT 0.6     CBC:   Recent Labs   Lab 04/24/20  0141   WBC 9.49   RBC 3.67*   HGB 10.7*   HCT 33.3*      MCV 91   MCH 29.2   MCHC 32.1     Lipid Panel:   Recent Labs   Lab 04/22/20  1720   CHOL 220*   LDLCALC 135.2   HDL 61   TRIG 119     Coagulation: No results for input(s): PT, INR, APTT in the last 168 hours.  Hgb A1C:   Recent Labs   Lab 04/22/20  1720   HGBA1C 5.6     TSH:   Recent Labs   Lab 04/22/20  1720   TSH 3.990       Diagnostic Results:      Brain imaging:  CT Head. Date: 04/22/20  Ill-defined region of decreased attenuation left insula anteriorly concerning for acute/recent infarction.  No significant mass effect or evidence for hemorrhagic conversion.    Vessel Imaging:  CTA OSH. Date: 04/22/20  L ICA occlusion          Josafat Bender MD  [facility]  Department of Vascular Neurology   [facility]

## 2020-04-24 NOTE — NURSING TRANSFER
Nursing Transfer Note      4/24/2020     Transfer From: NEURO ICU    Transfer via bed    Transfer with cardiac monitoring    Transported by transport tech    Medicines sent: none    Chart send with patient: Yes    Notified:     Patient reassessed at: 4/24/2020, 18:19 (date, time)    Upon arrival to floor: cardiac monitor applied, patient oriented to room, call bell in reach and bed in lowest position

## 2020-04-24 NOTE — PT/OT/SLP PROGRESS
Physical Therapy Treatment    Patient Name:  Teresa Vaughan   MRN:  [id number]    Recommendations:     Discharge Recommendations:  nursing facility, skilled   Discharge Equipment Recommendations: other (see comments)(TBD pending progress with therapy)   Barriers to discharge: Inaccessible home, Decreased caregiver support and patient is dependent with mobiltiy and ADLS    Assessment:     Teresa Vaughan is a 79 y.o. female admitted with a medical diagnosis of Stroke due to occlusion of left carotid artery.  She presents with the following impairments/functional limitations:  weakness, impaired endurance, gait instability, impaired balance, visual deficits, impaired sensation, impaired functional mobilty, decreased coordination, impaired cognition, impaired self care skills, decreased safety awareness, decreased lower extremity function, decreased upper extremity function, impaired coordination, impaired fine motor, edema. The patient continues to demonstrate R hemiparesis with minimal spontaneous R extremity movement, R hemianopsia and inattention, global aphasia. She is unable to follow simple commands with max verbal and manual cues. She required increased assistance to complete mobility tasks and maintain postural control in sitting due to contraversive pushing L to R. She is not safe to return home and would benefit from SNF placement to address the above deficits.      [place], [place] [id number].    Rehab Prognosis: Fair and Poor; patient would benefit from acute skilled PT services to address these deficits and reach maximum level of function.    Recent Surgery: * No surgery found *      Plan:     During this hospitalization, patient to be seen 3 x/week to address the identified rehab impairments via therapeutic activities, therapeutic exercises, neuromuscular re-education, gait training and progress toward the following goals:    · Plan of Care Expires:  05/22/20    Subjective     Chief  Complaint: unable to state  Patient/Family Comments/goals: unable to state  Pain/Comfort:  · Pain Rating 1: (no indication of pain, unable to rate)      Objective:     Communicated with RN prior to session.  Patient found HOB elevated with bed alarm, blood pressure cuff, SCD, telemetry, pulse ox (continuous), peripheral IV, pressure relief boots, PureWick upon PT entry to room.     General Precautions: Standard, aphasia, aspiration, fall, pureed diet, nectar thick   Orthopedic Precautions:N/A   Braces: N/A     Functional Mobility:    Bed Mobility  Rolling to R and L: total assistance   Supine to Sit on the L side:  total assistance  Sit to supine: total assistance   Scoot to HOB in supine: total assistance x2 drawsheet  Scoot to EOB in sitting: total assistance x2   Transfers Sit to Stand:  deferred, poor postural control            AM-PAC 6 CLICK MOBILITY  Turning over in bed (including adjusting bedclothes, sheets and blankets)?: 2  Sitting down on and standing up from a chair with arms (e.g., wheelchair, bedside commode, etc.): 2  Moving from lying on back to sitting on the side of the bed?: 2  Moving to and from a bed to a chair (including a wheelchair)?: 1  Need to walk in hospital room?: 1  Climbing 3-5 steps with a railing?: 1  Basic Mobility Total Score: 9       Therapeutic Activities and Exercises:   Sitting edge of bed 10 minutes, maximum assistance to total assist 2/2 L to R contraversive pushing  -Posterior pelvic tilt, kyphosis, cervical flexion  -Visual/verbal/manual cues for midline orientation, thoracic and cervical extension  -Hand over hand placement of R forearm elevated in weightbearing for proprioception, wrist and finger extension, shoulder approximation  -Cued repeatedly to maintain L hand in lap or holding rail  -Cues for visual tracking to midline  -Performed lateral weight shift onto L forearm to facilitate midline orientation, weight shift and porprioception- patient resistant, total  assistance, 10 sec x4 reps     Attempted AAROM R LE in supine, patietn unable to assist with facilitation and cuing, PROM R hip/knee flex/ext 10 reps, hip IR/ER 10 reps, ankle DF stretch with eversion 10 reps x 10 sec.     Positioned in supine, R UE elevated, towel roll for neutral hand positioning, heels floating.     Patient left HOB elevated with all lines intact, call button in reach, bed alarm on and RN notified..    GOALS:   Multidisciplinary Problems     Physical Therapy Goals        Problem: Physical Therapy Goal    Goal Priority Disciplines Outcome Goal Variances Interventions   Physical Therapy Goal     PT, PT/OT Ongoing, Progressing     Description:  Goals to be met by: 5/3     Patient will increase functional independence with mobility by performin. Supine to sit with MInimal Assistance  2. Sit to supine with MInimal Assistance  3. Sit to stand transfer with Moderate Assistance  4. Bed to chair transfer with Moderate Assistance using squat pivot technique  5. Gait  x 5 feet with Maximum Assistance using least restrictive device or no AD.    6. Sitting at edge of bed x10 minutes with Stand-by Assistance while performing a dynamic reaching task with no UE support on bed to prepare for functional activities in sitting  7. Lower extremity exercise program x15 reps per handout, with assistance as needed to improve strength and neuromuscular control to increase independence with mobility.                      Time Tracking:     PT Received On: 20  PT Start Time: 1040     PT Stop Time: 1106  PT Total Time (min): 26 min     Billable Minutes: Therapeutic Activity 12 and Neuromuscular Re-education 11 (co-tx with OT, low activity tolerance)    Treatment Type: Treatment  PT/PTA: PT     PTA Visit Number: 0     Jane Montero, PT  2020

## 2020-04-24 NOTE — PLAN OF CARE
POC reviewed with pt and friend via phone at 1700 . Pt aphasic and unable to verbalize understanding. Friend verbalized understanding and notified of transfer to . Questions and concerns addressed. No acute events today. Barium swallow completed without complication. Pt progressing toward goals. Will continue to monitor. See flowsheets for full assessment and VS info.

## 2020-04-24 NOTE — PT/OT/SLP PROGRESS
Occupational Therapy Treatment    Patient Name:  Teresa Vaughan   MRN:  [id number]  Admit Date: 4/22/2020  Admitting Diagnosis:  Stroke due to occlusion of left carotid artery   Length of Stay: 2 days  Recent Surgery: * No surgery found *      Recommendations:     Discharge Recommendations: nursing facility, skilled  Discharge Equipment Recommendations:  other (see comments)(TBD)  Barriers to discharge:  Inaccessible home environment, Decreased caregiver support    Plan:     Patient to be seen 3 x/week to address the above listed problems via self-care/home management, therapeutic activities, therapeutic exercises, neuromuscular re-education, cognitive retraining, sensory integration  · Plan of Care Expires: 05/23/20  · Plan of Care Reviewed with: patient    Assessment:     Teresa Vaughan is a 79 y.o. female admitted with a medical diagnosis of Stroke due to occlusion of left carotid artery.   Patient presented this date with continued global aphasia and RSW, inattentiveness to commands and attempts to orient and was unable to participate in ADLs assessed this date.  At this time, therapy is changing reccs to SNF to maximize potential for return to PLOF.   Pt continues to benefit from a collaborative PT/OT/SLP program to improve quality of life and focus on recovery of impairments.     Problem List: weakness, impaired endurance, impaired sensation, impaired self care skills, impaired functional mobilty, gait instability, impaired balance, visual deficits, impaired cognition, abnormal tone, decreased coordination, decreased upper extremity function, decreased lower extremity function, decreased safety awareness, pain, decreased ROM, impaired coordination, impaired fine motor, impaired cardiopulmonary response to activity, impaired skin, impaired joint extensibility.    Rehab Prognosis: Fair; patient would benefit from acute skilled OT services to address these deficits and reach maximum level of function.         Subjective   Communicated with: RN prior to session.  Patient found HOB elevated with bed alarm, blood pressure cuff, pressure relief boots, peripheral IV, pulse ox (continuous), SCD, telemetry, PureWick upon OT entry to room.    Patient: globally aphasic, [place] speaking, patient inconsistently responsive to name. [employer]  used this date.     Pain/Comfort:  · Pain Rating 1: other (see comments)(globally aphasic, no pain noted)  · Pain Rating Post-Intervention 1: 0/10    Objective:   Patient found with: bed alarm, blood pressure cuff, pressure relief boots, peripheral IV, pulse ox (continuous), SCD, telemetry, PureWick   General Precautions: Standard, Cardiac aphasia, aspiration, fall   Orthopedic Precautions:N/A   Braces: N/A   Oxygen Device: Room Air  Vitals: /64 (BP Location: Right arm, Patient Position: Lying)   Pulse 67   Temp 98.5 °F (36.9 °C) (Axillary)   Resp 20   Ht 5' (1.524 m)   Wt 51.7 kg (113 lb 15.7 oz)   SpO2 99%   Breastfeeding? No   BMI 22.26 kg/m²     Outcome Measures:  [facility] 6 Click ADL: 12    Cognition:   · Oriented X 1, inconsistently responsive to name  · Command following: Inattentive  · Fluency: expressive aphasia and receptive aphasia    Occupational Performance:  Bed Mobility:    · Patient completed Rolling/Turning to Left with  total assistance  · Scooting to HOB in supine: total assistance and 2 persons  · Patient completed Supine to Sit with total assistance and 2 persons on L side of bed  · Patient completed Sit to Supine with total assistance and 2 persons on L side of bed  · Scooting anteriorly to EOB to have both feet planted on floor: total assistance    Functional Mobility/Transfers:  · Further mobility deferred 2* impaired truncal control 2* impaired attention, impaired command follow, RSW, pushing to return to supine. Impaired attention to surroundings as patient fatigued sitting EOB with staff assistance.   · Static Sitting EOB: Max-Total A,  pushing with LUE  · Dynamic Sitting EOB: Max-Total A, pushing with LUE    Activities of Daily Living:  · Grooming: total assistance patient unable to sequence proper use of grooming tools (toothbrush, comb, washcloth), despite [place] cues, tactile cues, and max verbal cues.  Pt resistive to assistance with [place].    · Toileting: purewick in place, patient unable to notify toileting needs      AMPAC 6 Click ADL:  AMPAC Total Score: 12    Treatment & Education:  -Pt education on OT role and POC   -Importance of OOB activity with staff assistance  -Safety during functional transfer and mobility  -White board updated  -Multiple self-care tasks and functional mobility completed -- assistance level noted above  -All questions and concerns answered within OT scope of practice.       Patient left with bed in chair position with all lines intact, call button in reach, bed alarm on and RN notified    GOALS:   Multidisciplinary Problems     Occupational Therapy Goals        Problem: Occupational Therapy Goal    Goal Priority Disciplines Outcome Interventions   Occupational Therapy Goal     OT, PT/OT Ongoing, Progressing    Description:  Goals set on 4/23 with expiration date 5/8:  Patient will increase functional independence with ADLs by performing:    Supine <> Sit with Min Assistance.  Feeding with Min  Assistance.  Grooming while standing at sink with Min  Assistance.  UB Dressing with Min  Assistance.  LB Dressing with Min Assistance.  Step transfer with Min Assistance with DME as needed.  Pt will demonstrate understanding of education provided regarding energy conservation and task modification through teach-back method.   Patient will increase functional independence with ADLs by performing:  Patient will demonstrate assistance as needed with HEP for R UE weight bearing.      Patient's family / caregiver will demonstrate assistance as needed and safety with assisting patient with self-care skills and functional mobility.       Patient's family / caregiver will demonstrate assistance as needed with HEP, A/AAROM and changes in bed positioning.                               Time Tracking:     OT Date of Treatment: 04/24/20  OT Start Time: 1040  OT Stop Time: 1109  OT Total Time (min): 29 min  Additional staff present: PT      Billable Minutes:Self Care/Home Management 29      CHANDRA Siddiqi  4/24/2020

## 2020-04-24 NOTE — CONSULTS
[facility]  Physical Medicine & Rehab  Consult Note    Patient Name: Teresa Vaughan  MRN: [id number]  Admission Date: 4/22/2020  Hospital Length of Stay: 2 days  Attending Physician: Kenneth Morgan MD     Inpatient consult to Physical Medicine & Rehabilitation  Consult performed by: Sherice Blanton NP  Consult requested by:  Kenneth Morgan MD    Collaborating Physician: Azalea Parrish MD  Reason for Consult:  Assess rehabilitation needs     Consults  Subjective:     Principal Problem: Stroke due to occlusion of left carotid artery    HPI: Teresa Vaughan is a 79-year-old female with PMHx of  HTN and HLD. Patient presented to OSH for R sided weakness. CTA at OSH revealed L ICA occlusion and she was transferred to [facility] for possible IR intervention on 4/22. Patient went to IR for angioplasty and aspiration thrombectomy. MRI demonstrated involvment of L MCA, L PCA and L cerebellar aa with a R cerebellar finding as well. [facility] rec CTA pending. Hospital course complicated by HTN (cardene gtt stopped 4/23)    Functional History: Patient lives alone in a H, ramp to enter. Prior to admission, (I). DME: none.     Hospital Course:  4/23/20: Evaluated by PT & OT. Bed mobility modA- maxA x 2. Sit to stand maxA. Grooming maxA. LBD totalA.     Past Medical History:   Diagnosis Date    Essential hypertension 4/22/2020    Mixed hyperlipidemia 4/22/2020    Stroke due to occlusion of left carotid artery 4/22/2020     History reviewed. No pertinent surgical history.  Review of patient's allergies indicates:  No Known Allergies    Scheduled Medications:    aspirin  81 mg Oral Daily    atorvastatin  40 mg Oral Daily    clopidogreL  75 mg Oral Daily    heparin (porcine)  5,000 Units Subcutaneous Q8H    polyethylene glycol  17 g Oral Daily    senna-docusate 8.6-50 mg  1 tablet Oral BID       PRN Medications: acetaminophen, calcium gluconate IVPB, calcium gluconate IVPB, calcium gluconate IVPB,  magnesium sulfate IVPB, magnesium sulfate IVPB, ondansetron, potassium chloride in water **AND** potassium chloride in water **AND** potassium chloride in water, promethazine (PHENERGAN) IVPB, sodium chloride 0.9%, sodium phosphate IVPB, sodium phosphate IVPB, sodium phosphate IVPB    Family History     None        Tobacco Use    Smoking status: Unknown If Ever Smoked   Substance and Sexual Activity    Alcohol use: Not on file    Drug use: Not on file    Sexual activity: Not on file     Review of Systems   Reason unable to perform ROS: Aphasia.   Constitutional: Positive for activity change.   Neurological: Positive for weakness.     Objective:     Vital Signs (Most Recent):  Temp: 98.7 °F (37.1 °C) (04/24/20 0701)  Pulse: 67 (04/24/20 1101)  Resp: 20 (04/24/20 1101)  BP: 130/64 (04/24/20 1101)  SpO2: 99 % (04/24/20 1101)    Vital Signs (24h Range):  Temp:  [98.6 °F (37 °C)-99.7 °F (37.6 °C)] 98.7 °F (37.1 °C)  Pulse:  [56-84] 67  Resp:  [10-31] 20  SpO2:  [94 %-100 %] 99 %  BP: (118-171)/(56-72) 130/64  Arterial Line BP: ()/(53-95) 78/58     Body mass index is 22.26 kg/m².    Physical Exam   Constitutional: She appears well-developed and well-nourished.   HENT:   Head: Normocephalic and atraumatic.   Eyes: Pupils are equal, round, and reactive to light. EOM are normal.   Neck: Normal range of motion. Neck supple.   Pulmonary/Chest: Effort normal. No respiratory distress.   Abdominal: Normal appearance.   Musculoskeletal: She exhibits no tenderness or deformity.   Neurological: She is alert.   - L gaze   - Aphasia  - not following commands   Skin: Skin is warm and dry.   Psychiatric: She has a normal mood and affect.   Nursing note and vitals reviewed.        Diagnostic Results: Labs: Reviewed  ECG: Reviewed  CT: Reviewed    Assessment/Plan:     * Stroke due to occlusion of left carotid artery  - went to IR for angioplasty and aspiration thrombectomy.   - MRI demonstrated involvment of L MCA, L PCA and L  cerebellar aa with a R cerebellar finding as well.   - Vasc Neurology rec CTA pending.    - Related to prolonged/acute hospital course.     Recommendations  -  Encourage mobility, OOB in chair at least 3 hours per day, and early ambulation as appropriate  -  PT/OT evaluate and treat  -  Pain management  -  Monitor for and prevent skin breakdown and pressure ulcers  · Early mobility, repositioning/weight shifting every 20-30 minutes when sitting, turn patient every 2 hours, proper mattress/overlay and chair cushioning, pressure relief/heel protector boots  -  DVT prophylaxis    -  Reviewed discharge options (IP rehab, SNF, HH therapy, and OP therapy)    Essential hypertension  - cardene gtt stopped 4/23    Will follow progress with therapy.     Thank you for your consult.     Sherice Blanton NP  Department of Physical Medicine & Rehab  [facility]

## 2020-04-24 NOTE — NURSING
Modifiedd barium swallow completed without complication. Pt traveled to fluoroscopy room 4 via stretcher and primary RN on continuous cardiac monitor. Pt tolerated exam well. Returned to room safely at 1210. Bed locked and lowest position.  Alarms set. VSS. Neuro exam unchanged. Will continue to monitor.

## 2020-04-25 LAB
ALBUMIN SERPL BCP-MCNC: 3.1 G/DL (ref 3.5–5.2)
ALP SERPL-CCNC: 66 U/L (ref 55–135)
ALT SERPL W/O P-5'-P-CCNC: 14 U/L (ref 10–44)
ANION GAP SERPL CALC-SCNC: 11 MMOL/L (ref 8–16)
AST SERPL-CCNC: 26 U/L (ref 10–40)
BASOPHILS # BLD AUTO: 0.06 K/UL (ref 0–0.2)
BASOPHILS NFR BLD: 0.6 % (ref 0–1.9)
BILIRUB SERPL-MCNC: 0.7 MG/DL (ref 0.1–1)
BUN SERPL-MCNC: 12 MG/DL (ref 8–23)
CALCIUM SERPL-MCNC: 8.7 MG/DL (ref 8.7–10.5)
CHLORIDE SERPL-SCNC: 104 MMOL/L (ref 95–110)
CO2 SERPL-SCNC: 24 MMOL/L (ref 23–29)
CREAT SERPL-MCNC: 0.8 MG/DL (ref 0.5–1.4)
DIFFERENTIAL METHOD: ABNORMAL
EOSINOPHIL # BLD AUTO: 0.2 K/UL (ref 0–0.5)
EOSINOPHIL NFR BLD: 2.2 % (ref 0–8)
ERYTHROCYTE [DISTWIDTH] IN BLOOD BY AUTOMATED COUNT: 13.5 % (ref 11.5–14.5)
EST. GFR  (AFRICAN AMERICAN): >60 ML/MIN/1.73 M^2
EST. GFR  (NON AFRICAN AMERICAN): >60 ML/MIN/1.73 M^2
GLUCOSE SERPL-MCNC: 102 MG/DL (ref 70–110)
HCT VFR BLD AUTO: 36 % (ref 37–48.5)
HGB BLD-MCNC: 11.2 G/DL (ref 12–16)
IMM GRANULOCYTES # BLD AUTO: 0.03 K/UL (ref 0–0.04)
IMM GRANULOCYTES NFR BLD AUTO: 0.3 % (ref 0–0.5)
LYMPHOCYTES # BLD AUTO: 2.2 K/UL (ref 1–4.8)
LYMPHOCYTES NFR BLD: 23.2 % (ref 18–48)
MAGNESIUM SERPL-MCNC: 2.1 MG/DL (ref 1.6–2.6)
MCH RBC QN AUTO: 28.5 PG (ref 27–31)
MCHC RBC AUTO-ENTMCNC: 31.1 G/DL (ref 32–36)
MCV RBC AUTO: 92 FL (ref 82–98)
MONOCYTES # BLD AUTO: 1 K/UL (ref 0.3–1)
MONOCYTES NFR BLD: 10.9 % (ref 4–15)
NEUTROPHILS # BLD AUTO: 6 K/UL (ref 1.8–7.7)
NEUTROPHILS NFR BLD: 62.8 % (ref 38–73)
NRBC BLD-RTO: 0 /100 WBC
PHOSPHATE SERPL-MCNC: 3.4 MG/DL (ref 2.7–4.5)
PLATELET # BLD AUTO: 177 K/UL (ref 150–350)
PMV BLD AUTO: 11.8 FL (ref 9.2–12.9)
POCT GLUCOSE: 106 MG/DL (ref 70–110)
POCT GLUCOSE: 106 MG/DL (ref 70–110)
POTASSIUM SERPL-SCNC: 3.8 MMOL/L (ref 3.5–5.1)
PROT SERPL-MCNC: 6.6 G/DL (ref 6–8.4)
RBC # BLD AUTO: 3.93 M/UL (ref 4–5.4)
SODIUM SERPL-SCNC: 139 MMOL/L (ref 136–145)
WBC # BLD AUTO: 9.56 K/UL (ref 3.9–12.7)

## 2020-04-25 PROCEDURE — 99233 SBSQ HOSP IP/OBS HIGH 50: CPT | Mod: ,,, | Performed by: PSYCHIATRY & NEUROLOGY

## 2020-04-25 PROCEDURE — 84100 ASSAY OF PHOSPHORUS: CPT

## 2020-04-25 PROCEDURE — 83735 ASSAY OF MAGNESIUM: CPT

## 2020-04-25 PROCEDURE — 25500020 PHARM REV CODE 255: Performed by: PSYCHIATRY & NEUROLOGY

## 2020-04-25 PROCEDURE — 99233 PR SUBSEQUENT HOSPITAL CARE,LEVL III: ICD-10-PCS | Mod: ,,, | Performed by: PSYCHIATRY & NEUROLOGY

## 2020-04-25 PROCEDURE — 25000003 PHARM REV CODE 250: Performed by: INTERNAL MEDICINE

## 2020-04-25 PROCEDURE — 63600175 PHARM REV CODE 636 W HCPCS: Performed by: INTERNAL MEDICINE

## 2020-04-25 PROCEDURE — 85025 COMPLETE CBC W/AUTO DIFF WBC: CPT

## 2020-04-25 PROCEDURE — 80053 COMPREHEN METABOLIC PANEL: CPT

## 2020-04-25 PROCEDURE — 36415 COLL VENOUS BLD VENIPUNCTURE: CPT

## 2020-04-25 PROCEDURE — 20600001 HC STEP DOWN PRIVATE ROOM

## 2020-04-25 RX ADMIN — IOHEXOL 75 ML: 350 INJECTION, SOLUTION INTRAVENOUS at 06:04

## 2020-04-25 RX ADMIN — HEPARIN SODIUM 5000 UNITS: 5000 INJECTION INTRAVENOUS; SUBCUTANEOUS at 09:04

## 2020-04-25 RX ADMIN — HEPARIN SODIUM 5000 UNITS: 5000 INJECTION INTRAVENOUS; SUBCUTANEOUS at 05:04

## 2020-04-25 RX ADMIN — ACETAMINOPHEN 650 MG: 325 TABLET ORAL at 09:04

## 2020-04-25 RX ADMIN — STANDARDIZED SENNA CONCENTRATE AND DOCUSATE SODIUM 1 TABLET: 8.6; 5 TABLET ORAL at 09:04

## 2020-04-25 RX ADMIN — HEPARIN SODIUM 5000 UNITS: 5000 INJECTION INTRAVENOUS; SUBCUTANEOUS at 02:04

## 2020-04-25 NOTE — SUBJECTIVE & OBJECTIVE
Subjective:     Interval History:   Patient stepped down to stroke primary service. CTA head and neck complete- L ICA re occluded but reconstitutes distally. Neuro exam unchanged.     Review of Systems   Constitutional: Negative for chills and fever.   Respiratory: Negative for cough.    Gastrointestinal: Negative for nausea and vomiting.   Neurological: Positive for facial asymmetry, speech difficulty and weakness. Negative for numbness.   Psychiatric/Behavioral: Positive for confusion and decreased concentration. Negative for agitation.     Objective:     Vital Signs (Most Recent):  Temp: 100.2 °F (37.9 °C) (04/25/20 1517)  Pulse: 78 (04/25/20 1600)  Resp: 18 (04/25/20 1517)  BP: 132/63 (04/25/20 1517)  SpO2: (!) 93 % (04/25/20 1517)    Vital Signs Range (Last 24H):  Temp:  [97.4 °F (36.3 °C)-100.2 °F (37.9 °C)]   Pulse:  [58-80]   Resp:  [16-18]   BP: (131-149)/()   SpO2:  [93 %-97 %]     Physical Exam   Constitutional: She appears well-developed and well-nourished. No distress.   HENT:   Head: Normocephalic and atraumatic.   Eyes: EOM are normal.   Cardiovascular: Normal rate.   Pulmonary/Chest: Effort normal. No respiratory distress.   Neurological: She is alert.   Skin: Skin is warm and dry.   Vitals reviewed.      Neurological Exam:   LOC: alert  Attention Span: poor  Language: Global aphasia  Articulation: Untestable due to severe aphasia   Orientation: Untestable due to severe aphasia   Visual Fields: Full  EOM (CN III, IV, VI): Full/intact  Facial Movement (CN VII): symmetric   Motor: Arm left  Normal 5/5  Leg left  Normal 3/5  Arm right  Normal 3/5  Leg right Paresis: 4/5  Sensation: Intact to light touch, temperature and vibration  Tone: Normal tone throughout      Laboratory:  CMP:   Recent Labs   Lab 04/25/20  0322   CALCIUM 8.7   ALBUMIN 3.1*   PROT 6.6      K 3.8   CO2 24      BUN 12   CREATININE 0.8   ALKPHOS 66   ALT 14   AST 26   BILITOT 0.7     CBC:   Recent Labs   Lab  04/25/20  0322   WBC 9.56   RBC 3.93*   HGB 11.2*   HCT 36.0*      MCV 92   MCH 28.5   MCHC 31.1*     Lipid Panel:   Recent Labs   Lab 04/22/20  1720   CHOL 220*   LDLCALC 135.2   HDL 61   TRIG 119     Coagulation: No results for input(s): PT, INR, APTT in the last 168 hours.  Hgb A1C:   Recent Labs   Lab 04/22/20  1720   HGBA1C 5.6     TSH:   Recent Labs   Lab 04/22/20  1720   TSH 3.990       Diagnostic Results:      Brain imaging:    MRI brain w/o contrast 4/25/20  _    Prominent areas of acute ischemia/infarct involving the left cerebral hemisphere as detailed above with additional focal areas involving the left cerebellum and a focus of the right cerebellum.    Focal area of signal hypointensity of the left thalamus may relate to an area of focal hemorrhagic conversion a large degree of hemorrhagic conversion is not appreciated.    Signal abnormality of the left internal carotid artery likely relates to diminished flow or potentially lack of flow, correlation with the recent angiographic procedure is recommended.      CT Head. Date: 04/22/20  Ill-defined region of decreased attenuation left insula anteriorly concerning for acute/recent infarction.  No significant mass effect or evidence for hemorrhagic conversion.    Vessel Imaging:  CTA OSH. Date: 04/22/20  L ICA occlusion

## 2020-04-26 LAB
ALBUMIN SERPL BCP-MCNC: 3 G/DL (ref 3.5–5.2)
ALP SERPL-CCNC: 66 U/L (ref 55–135)
ALT SERPL W/O P-5'-P-CCNC: 14 U/L (ref 10–44)
ANION GAP SERPL CALC-SCNC: 12 MMOL/L (ref 8–16)
AST SERPL-CCNC: 29 U/L (ref 10–40)
BASOPHILS # BLD AUTO: 0.06 K/UL (ref 0–0.2)
BASOPHILS NFR BLD: 0.7 % (ref 0–1.9)
BILIRUB SERPL-MCNC: 0.6 MG/DL (ref 0.1–1)
BUN SERPL-MCNC: 18 MG/DL (ref 8–23)
CALCIUM SERPL-MCNC: 8.9 MG/DL (ref 8.7–10.5)
CHLORIDE SERPL-SCNC: 105 MMOL/L (ref 95–110)
CO2 SERPL-SCNC: 23 MMOL/L (ref 23–29)
CREAT SERPL-MCNC: 0.8 MG/DL (ref 0.5–1.4)
DIFFERENTIAL METHOD: ABNORMAL
EOSINOPHIL # BLD AUTO: 0.2 K/UL (ref 0–0.5)
EOSINOPHIL NFR BLD: 2.3 % (ref 0–8)
ERYTHROCYTE [DISTWIDTH] IN BLOOD BY AUTOMATED COUNT: 13.4 % (ref 11.5–14.5)
EST. GFR  (AFRICAN AMERICAN): >60 ML/MIN/1.73 M^2
EST. GFR  (NON AFRICAN AMERICAN): >60 ML/MIN/1.73 M^2
GLUCOSE SERPL-MCNC: 96 MG/DL (ref 70–110)
HCT VFR BLD AUTO: 34 % (ref 37–48.5)
HGB BLD-MCNC: 10.7 G/DL (ref 12–16)
IMM GRANULOCYTES # BLD AUTO: 0.02 K/UL (ref 0–0.04)
IMM GRANULOCYTES NFR BLD AUTO: 0.2 % (ref 0–0.5)
LYMPHOCYTES # BLD AUTO: 1.9 K/UL (ref 1–4.8)
LYMPHOCYTES NFR BLD: 21.3 % (ref 18–48)
MAGNESIUM SERPL-MCNC: 2.3 MG/DL (ref 1.6–2.6)
MCH RBC QN AUTO: 28.8 PG (ref 27–31)
MCHC RBC AUTO-ENTMCNC: 31.5 G/DL (ref 32–36)
MCV RBC AUTO: 91 FL (ref 82–98)
MONOCYTES # BLD AUTO: 1 K/UL (ref 0.3–1)
MONOCYTES NFR BLD: 11.7 % (ref 4–15)
NEUTROPHILS # BLD AUTO: 5.6 K/UL (ref 1.8–7.7)
NEUTROPHILS NFR BLD: 63.8 % (ref 38–73)
NRBC BLD-RTO: 0 /100 WBC
PHOSPHATE SERPL-MCNC: 4 MG/DL (ref 2.7–4.5)
PLATELET # BLD AUTO: 203 K/UL (ref 150–350)
PMV BLD AUTO: 11.2 FL (ref 9.2–12.9)
POCT GLUCOSE: 102 MG/DL (ref 70–110)
POCT GLUCOSE: 98 MG/DL (ref 70–110)
POTASSIUM SERPL-SCNC: 3.6 MMOL/L (ref 3.5–5.1)
PROT SERPL-MCNC: 6.8 G/DL (ref 6–8.4)
RBC # BLD AUTO: 3.72 M/UL (ref 4–5.4)
SODIUM SERPL-SCNC: 140 MMOL/L (ref 136–145)
WBC # BLD AUTO: 8.75 K/UL (ref 3.9–12.7)

## 2020-04-26 PROCEDURE — 63600175 PHARM REV CODE 636 W HCPCS: Performed by: INTERNAL MEDICINE

## 2020-04-26 PROCEDURE — 80053 COMPREHEN METABOLIC PANEL: CPT

## 2020-04-26 PROCEDURE — 83735 ASSAY OF MAGNESIUM: CPT

## 2020-04-26 PROCEDURE — 84100 ASSAY OF PHOSPHORUS: CPT

## 2020-04-26 PROCEDURE — 36415 COLL VENOUS BLD VENIPUNCTURE: CPT

## 2020-04-26 PROCEDURE — 85025 COMPLETE CBC W/AUTO DIFF WBC: CPT

## 2020-04-26 PROCEDURE — 20600001 HC STEP DOWN PRIVATE ROOM

## 2020-04-26 PROCEDURE — 99233 SBSQ HOSP IP/OBS HIGH 50: CPT | Mod: ,,, | Performed by: PSYCHIATRY & NEUROLOGY

## 2020-04-26 PROCEDURE — 25000003 PHARM REV CODE 250: Performed by: INTERNAL MEDICINE

## 2020-04-26 PROCEDURE — 94760 N-INVAS EAR/PLS OXIMETRY 1: CPT

## 2020-04-26 PROCEDURE — 99233 PR SUBSEQUENT HOSPITAL CARE,LEVL III: ICD-10-PCS | Mod: ,,, | Performed by: PSYCHIATRY & NEUROLOGY

## 2020-04-26 RX ADMIN — STANDARDIZED SENNA CONCENTRATE AND DOCUSATE SODIUM 1 TABLET: 8.6; 5 TABLET ORAL at 09:04

## 2020-04-26 RX ADMIN — ATORVASTATIN CALCIUM 40 MG: 20 TABLET, FILM COATED ORAL at 09:04

## 2020-04-26 RX ADMIN — ASPIRIN 81 MG: 81 TABLET, COATED ORAL at 09:04

## 2020-04-26 RX ADMIN — CLOPIDOGREL BISULFATE 75 MG: 75 TABLET ORAL at 09:04

## 2020-04-26 RX ADMIN — HEPARIN SODIUM 5000 UNITS: 5000 INJECTION INTRAVENOUS; SUBCUTANEOUS at 05:04

## 2020-04-26 RX ADMIN — POLYETHYLENE GLYCOL 3350 17 G: 17 POWDER, FOR SOLUTION ORAL at 09:04

## 2020-04-26 RX ADMIN — HEPARIN SODIUM 5000 UNITS: 5000 INJECTION INTRAVENOUS; SUBCUTANEOUS at 08:04

## 2020-04-26 RX ADMIN — HEPARIN SODIUM 5000 UNITS: 5000 INJECTION INTRAVENOUS; SUBCUTANEOUS at 02:04

## 2020-04-26 NOTE — ASSESSMENT & PLAN NOTE
Teresa Vaughan is a 79 y.o. female with PMHx of HTN and HLD who presented to OSH with RSW and aphasia. She was seen via telemedicine and not eligible for tPA (last known normal yesterday). CTA obtained at OSH with L ICA occlusion and patient transferred to [facility] for possible IR intervention. CT head on arrival to [facility] with ischemic changes in L insula. Patient went to IR for angioplasty and aspiration thrombectomy. MRI demonstrated involvment of L MCA, L PCA and L cerebellar aa with a R cerebellar finding as well. TTE demonstrated EF 70% with no other significant findings. Patient has already tested negative for COVID     Patient stepped down to stroke primary service on 4/25. CTA head and neck complete- L ICA re occluded but reconstitutes distally. Stroke etiology likely ESUS. Patient to discharge with 30 day event monitor.      Antithrombotics for secondary stroke prevention: Antiplatelets: Aspirin: 81 mg daily, Plavix 75    Statins for secondary stroke prevention and hyperlipidemia, if present:   Statins: Atorvastatin- 40 mg daily    Aggressive risk factor modification: HTN, HLD     Rehab efforts: The patient has been evaluated by a stroke team provider and the therapy needs have been fully considered based off the presenting complaints and exam findings. The following therapy evaluations are needed: PT evaluate and treat, OT evaluate and treat, SLP evaluate and treat, PM&R evaluate for appropriate placement    Diagnostics ordered/pending: none    VTE prophylaxis: Heparin 5000 units SQ every 8 hours    BP parameters: Infarct: Post sucessful thrombectomy, SBP <140

## 2020-04-26 NOTE — PLAN OF CARE
Plan of care updated with patient. No falls during shift. No skin breakdown. Maintained fall protocol. PT/OT/ST following pt. Heparin Q8H. UA orderd - no specimen has been produced at this time. Pt disoriented x4, globally aphasic, and bedbound. Addressed all issues throughout shift.

## 2020-04-26 NOTE — SUBJECTIVE & OBJECTIVE
Subjective:     Interval History:     Neuro exam stable. Stroke etiology likely ESUS. Patient to discharge with 30 day event monitor.      Review of Systems   Constitutional: Negative for chills and fever.   Respiratory: Negative for cough.    Gastrointestinal: Negative for nausea and vomiting.   Neurological: Positive for facial asymmetry, speech difficulty and weakness. Negative for numbness.   Psychiatric/Behavioral: Positive for confusion and decreased concentration. Negative for agitation.     Objective:     Vital Signs (Most Recent):  Temp: 99 °F (37.2 °C) (04/26/20 1135)  Pulse: 66 (04/26/20 1200)  Resp: 18 (04/26/20 1135)  BP: (!) 128/59 (04/26/20 1135)  SpO2: 97 % (04/26/20 1135)    Vital Signs Range (Last 24H):  Temp:  [98.3 °F (36.8 °C)-100.9 °F (38.3 °C)]   Pulse:  [61-80]   Resp:  [16-19]   BP: (103-143)/(57-77)   SpO2:  [93 %-99 %]     Physical Exam   Constitutional: She appears well-developed and well-nourished. No distress.   HENT:   Head: Normocephalic and atraumatic.   Eyes: EOM are normal.   Cardiovascular: Normal rate.   Pulmonary/Chest: Effort normal. No respiratory distress.   Neurological: She is alert.   Skin: Skin is warm and dry.   Vitals reviewed.      Neurological Exam:   LOC: alert  Attention Span: poor  Language: Global aphasia  Articulation: Untestable due to severe aphasia   Orientation: Untestable due to severe aphasia   Visual Fields: Full  EOM (CN III, IV, VI): Full/intact  Facial Movement (CN VII): symmetric   Motor: Arm left  Normal 5/5  Leg left  Normal 4/5  Arm right  Normal 3/5  Leg right Paresis: 3/5  Sensation: Intact to light touch, temperature and vibration  Tone: Normal tone throughout      Laboratory:  CMP:   Recent Labs   Lab 04/26/20  0520   CALCIUM 8.9   ALBUMIN 3.0*   PROT 6.8      K 3.6   CO2 23      BUN 18   CREATININE 0.8   ALKPHOS 66   ALT 14   AST 29   BILITOT 0.6     CBC:   Recent Labs   Lab 04/26/20  0520   WBC 8.75   RBC 3.72*   HGB 10.7*    HCT 34.0*      MCV 91   MCH 28.8   MCHC 31.5*     Lipid Panel:   Recent Labs   Lab 04/22/20  1720   CHOL 220*   LDLCALC 135.2   HDL 61   TRIG 119     Coagulation: No results for input(s): PT, INR, APTT in the last 168 hours.  Hgb A1C:   Recent Labs   Lab 04/22/20  1720   HGBA1C 5.6     TSH:   Recent Labs   Lab 04/22/20  1720   TSH 3.990       Diagnostic Results:      Brain imaging:    MRI brain w/o contrast 4/25/20  _    Prominent areas of acute ischemia/infarct involving the left cerebral hemisphere as detailed above with additional focal areas involving the left cerebellum and a focus of the right cerebellum.    Focal area of signal hypointensity of the left thalamus may relate to an area of focal hemorrhagic conversion a large degree of hemorrhagic conversion is not appreciated.    Signal abnormality of the left internal carotid artery likely relates to diminished flow or potentially lack of flow, correlation with the recent angiographic procedure is recommended.      CT Head. Date: 04/22/20  Ill-defined region of decreased attenuation left insula anteriorly concerning for acute/recent infarction.  No significant mass effect or evidence for hemorrhagic conversion.    Vessel Imaging:  CTA OSH. Date: 04/22/20  L ICA occlusion      Cardiac imaging    TTE 4/26/20  · Normal left ventricular systolic function. The estimated ejection fraction is 70%.  · No wall motion abnormalities.  · Indeterminate left ventricular diastolic function.  · Normal right ventricular systolic function.  · The estimated PA systolic pressure is 32 mmHg.  · Normal central venous pressure (3 mmHg).  · Mild left atrial enlargement.

## 2020-04-26 NOTE — PLAN OF CARE
Plan of care updated with patient. No falls during shift. No skin breakdown. Maintained fall protocol. PT/OT/ST following pt. Heparin Q8H. Pt disoriented x4 and bedbound. Addressed all issues throughout shift.

## 2020-04-26 NOTE — PROGRESS NOTES
[facility]  Vascular Neurology  Comprehensive Stroke Center  Progress Note    Assessment/Plan:     * Stroke due to occlusion of left carotid artery  Teresa Vaughan is a 79 y.o. female with PMHx of HTN and HLD who presented to OSH with RSW and aphasia. She was seen via telemedicine and not eligible for tPA (last known normal yesterday). CTA obtained at OSH with L ICA occlusion and patient transferred to [facility] for possible IR intervention. CT head on arrival to [facility] with ischemic changes in L insula. Patient went to IR for angioplasty and aspiration thrombectomy. MRI demonstrated involvment of L MCA, L PCA and L cerebellar aa with a R cerebellar finding as well. TTE demonstrated EF 70% with no other significant findings. Patient has already tested negative for COVID     Patient stepped down to stroke primary service on 4/25. CTA head and neck complete- L ICA re occluded but reconstitutes distally. Stroke etiology likely ESUS. Patient to discharge with 30 day event monitor.      Antithrombotics for secondary stroke prevention: Antiplatelets: Aspirin: 81 mg daily, Plavix 75    Statins for secondary stroke prevention and hyperlipidemia, if present:   Statins: Atorvastatin- 40 mg daily    Aggressive risk factor modification: HTN, HLD     Rehab efforts: The patient has been evaluated by a stroke team provider and the therapy needs have been fully considered based off the presenting complaints and exam findings. The following therapy evaluations are needed: PT evaluate and treat, OT evaluate and treat, SLP evaluate and treat, PM&R evaluate for appropriate placement    Diagnostics ordered/pending: none    VTE prophylaxis: Heparin 5000 units SQ every 8 hours    BP parameters: Infarct: Post sucessful thrombectomy, SBP <140          Cytotoxic cerebral edema  Area of cytotoxic cerebral edema identified when reviewing brain imaging in the territory of the L internal carotid artery. There is no mass effect  associated with it. We will continue to monitor the patients clinical exam for any worsening of symptoms which may indicate expansion of the stroke or the area of the edema resulting in the clinical change. The pattern is suggestive of ESUS etiology.        Mixed hyperlipidemia  Stroke risk factor  Lipid panel pending  Home medications unknown  Recommend starting atorvastatin 40 mg daily    Essential hypertension  Stroke risk factor  SBP <140 for successful thombectomy         Teresa Vaughan is a 79 y.o. female with PMHx of HTN and HLD who presented to [facility] with aphasia and RSW. Patient went to ED after being found outside wandering around by her neighbors. She was seen via telemedicine and was not a candidate for tPA . CTA at OSH revealed L ICA occlusion. Patient [place] [facility] [place] intervention. Angioplasty and Aspiration thrombectomy completed at that time. Patient primarily Lebanese speaking and aphasic.     4/25: Patient stepped down to stroke primary service. CTA head and neck complete- L ICA re occluded but reconstitutes distally. Neuro exam unchanged.   4/26: Neuro exam stable. Stroke etiology likely ESUS. Patient to discharge with 30 day event monitor.    STROKE DOCUMENTATION   Acute Stroke Times   Last Known Normal Date: 04/21/20  Last Known Normal Time: 1800  Symptom Onset Date: (unknown)  Symptom Onset Time: (unknown)  Stroke Team Called Date: 04/22/20  Stroke Team Called Time: 1415  Stroke Team Arrival Date: 04/22/20  Stroke Team Arrival Time: 1415  CT Interpretation Time: 1425  Decision to Treat Time for Alteplase: (decision made at OSH via telemedicine)  Decision to Treat Time for IR: 1425    NIH Scale:  1a. Level of Consciousness: 0-->Alert, keenly responsive  1b. LOC Questions: 2-->Answers neither question correctly  1c. LOC Commands: 2-->Performs neither task correctly  2. Best Gaze: 1-->Partial gaze palsy, gaze is abnormal in one or both eyes, but forced  deviation or total gaze paresis is not present  3. Visual: 2-->Complete hemianopia  4. Facial Palsy: 0-->Normal symmetrical movements  5a. Motor Arm, Left: 1-->Drift, limb holds 90 (or 45) degrees, but drifts down before full 10 seconds, does not hit bed or other support  5b. Motor Arm, Right: 3-->No effort against gravity, limb falls  6a. Motor Leg, Left: 3-->No effort against gravity, leg falls to bed immediately  6b. Motor Leg, Right: 2-->Some effort against gravity, leg falls to bed by 5 secs, but has some effort against gravity  7. Limb Ataxia: 0-->Absent  8. Sensory: 0-->Normal, no sensory loss  9. Best Language: 3-->Mute, global aphasia, no usable speech or auditory comprehension  10. Dysarthria: 1-->Mild-to-moderate dysarthria, patient slurs at least some words and, at worst, can be understood with some difficulty  11. Extinction and Inattention (formerly Neglect): 0-->No abnormality  Total (NIH Stroke Scale): 20       Modified [place]    Jennifer Coma Scale:    ABCD2 Score:    [id number] Score:   HAS -BLED Score:   ICH Score:   Hunt & Dahl Classification:      Hemorrhagic change of an Ischemic Stroke: Does this patient have an ischemic stroke with hemorrhagic changes? No         Subjective:     Interval History:     Neuro exam stable. Stroke etiology likely ESUS. Patient to discharge with 30 day event monitor.      Review of Systems   Constitutional: Negative for chills and fever.   Respiratory: Negative for cough.    Gastrointestinal: Negative for nausea and vomiting.   Neurological: Positive for facial asymmetry, speech difficulty and weakness. Negative for numbness.   Psychiatric/Behavioral: Positive for confusion and decreased concentration. Negative for agitation.     Objective:     Vital Signs (Most Recent):  Temp: 99 °F (37.2 °C) (04/26/20 1135)  Pulse: 66 (04/26/20 1200)  Resp: 18 (04/26/20 1135)  BP: (!) 128/59 (04/26/20 1135)  SpO2: 97 % (04/26/20 1135)    Vital Signs Range (Last 24H):  Temp:   [98.3 °F (36.8 °C)-100.9 °F (38.3 °C)]   Pulse:  [61-80]   Resp:  [16-19]   BP: (103-143)/(57-77)   SpO2:  [93 %-99 %]     Physical Exam   Constitutional: She appears well-developed and well-nourished. No distress.   HENT:   Head: Normocephalic and atraumatic.   Eyes: EOM are normal.   Cardiovascular: Normal rate.   Pulmonary/Chest: Effort normal. No respiratory distress.   Neurological: She is alert.   Skin: Skin is warm and dry.   Vitals reviewed.      Neurological Exam:   LOC: alert  Attention Span: poor  Language: Global aphasia  Articulation: Untestable due to severe aphasia   Orientation: Untestable due to severe aphasia   Visual Fields: Full  EOM (CN III, IV, VI): Full/intact  Facial Movement (CN VII): symmetric   Motor: Arm left  Normal 5/5  Leg left  Normal 4/5  Arm right  Normal 3/5  Leg right Paresis: 3/5  Sensation: Intact to light touch, temperature and vibration  Tone: Normal tone throughout      Laboratory:  CMP:   Recent Labs   Lab 04/26/20  0520   CALCIUM 8.9   ALBUMIN 3.0*   PROT 6.8      K 3.6   CO2 23      BUN 18   CREATININE 0.8   ALKPHOS 66   ALT 14   AST 29   BILITOT 0.6     CBC:   Recent Labs   Lab 04/26/20  0520   WBC 8.75   RBC 3.72*   HGB 10.7*   HCT 34.0*      MCV 91   MCH 28.8   MCHC 31.5*     Lipid Panel:   Recent Labs   Lab 04/22/20  1720   CHOL 220*   LDLCALC 135.2   HDL 61   TRIG 119     Coagulation: No results for input(s): PT, INR, APTT in the last 168 hours.  Hgb A1C:   Recent Labs   Lab 04/22/20  1720   HGBA1C 5.6     TSH:   Recent Labs   Lab 04/22/20  1720   TSH 3.990       Diagnostic Results:      Brain imaging:    MRI brain w/o contrast 4/25/20  _    Prominent areas of acute ischemia/infarct involving the left cerebral hemisphere as detailed above with additional focal areas involving the left cerebellum and a focus of the right cerebellum.    Focal area of signal hypointensity of the left thalamus may relate to an area of focal hemorrhagic conversion a  large degree of hemorrhagic conversion is not appreciated.    Signal abnormality of the left internal carotid artery likely relates to diminished flow or potentially lack of flow, correlation with the recent angiographic procedure is recommended.      CT Head. Date: 04/22/20  Ill-defined region of decreased attenuation left insula anteriorly concerning for acute/recent infarction.  No significant mass effect or evidence for hemorrhagic conversion.    Vessel Imaging:  CTA OSH. Date: 04/22/20  L ICA occlusion      Cardiac imaging    TTE 4/26/20  · Normal left ventricular systolic function. The estimated ejection fraction is 70%.  · No wall motion abnormalities.  · Indeterminate left ventricular diastolic function.  · Normal right ventricular systolic function.  · The estimated PA systolic pressure is 32 mmHg.  · Normal central venous pressure (3 mmHg).  · Mild left atrial enlargement.        Ester Henry NP  [facility]  Department of Vascular Neurology   [facility]

## 2020-04-26 NOTE — PLAN OF CARE
Disoriented X4, Non-verbal. NAD, VSS. All medication crushed in applesauce per diet order. Fall protocol maintained; Bedside table and call light in reach. Off-loading devices used on bilat feet. Right sided weakness noted. POC explained to pt. Will continue to monitor.

## 2020-04-26 NOTE — ASSESSMENT & PLAN NOTE
Teresa Vaughan is a 79 y.o. female with PMHx of HTN and HLD who presented to OSH with RSW and aphasia. She was seen via telemedicine and not eligible for tPA (last known normal yesterday). CTA obtained at OSH with L ICA occlusion and patient transferred to [facility] for possible IR intervention. CT head on arrival to [facility] with ischemic changes in L insula. Patient went to IR for angioplasty and aspiration thrombectomy. MRI demonstrated involvment of L MCA, L PCA and L cerebellar aa with a R cerebellar finding as well. Etiology most likely embolic in nature v possible KIM. TTE demonstrated EF 70% with no other significant findings. Patient has already tested negative for COVID    Likely embolic in nature    Patient stepped down to stroke primary service. CTA head and neck complete- L ICA re occluded but reconstitutes distally. Neuro exam unchanged.      Antithrombotics for secondary stroke prevention: Antiplatelets: Aspirin: 81 mg daily, Plavix 75    Statins for secondary stroke prevention and hyperlipidemia, if present:   Statins: Atorvastatin- 40 mg daily    Aggressive risk factor modification: HTN, HLD     Rehab efforts: The patient has been evaluated by a stroke team provider and the therapy needs have been fully considered based off the presenting complaints and exam findings. The following therapy evaluations are needed: PT evaluate and treat, OT evaluate and treat, SLP evaluate and treat, PM&R evaluate for appropriate placement    Diagnostics ordered/pending: none    VTE prophylaxis: Heparin 5000 units SQ every 8 hours    BP parameters: Infarct: Post sucessful thrombectomy, SBP <140

## 2020-04-26 NOTE — ASSESSMENT & PLAN NOTE
Area of cytotoxic cerebral edema identified when reviewing brain imaging in the territory of the L internal carotid artery. There is no mass effect associated with it. We will continue to monitor the patients clinical exam for any worsening of symptoms which may indicate expansion of the stroke or the area of the edema resulting in the clinical change. The pattern is suggestive of ESUS etiology.

## 2020-04-26 NOTE — PROGRESS NOTES
[facility]  Vascular Neurology  Comprehensive Stroke Center  Progress Note    Assessment/Plan:     * Stroke due to occlusion of left carotid artery  Teresa Vaughan is a 79 y.o. female with PMHx of HTN and HLD who presented to OSH with RSW and aphasia. She was seen via telemedicine and not eligible for tPA (last known normal yesterday). CTA obtained at OSH with L ICA occlusion and patient transferred to [facility] [place] for possible IR intervention. CT head on arrival to [facility] with ischemic changes in L insula. Patient went to IR for angioplasty and aspiration thrombectomy. MRI demonstrated involvment of L MCA, L PCA and L cerebellar aa with a R cerebellar finding as well. Etiology most likely embolic in nature v possible KIM. TTE demonstrated EF 70% with no other significant findings. Patient has already tested negative for COVID    Likely embolic in nature    Patient stepped down to stroke primary service. CTA head and neck complete- L ICA re occluded but reconstitutes distally. Neuro exam unchanged.      Antithrombotics for secondary stroke prevention: Antiplatelets: Aspirin: 81 mg daily, Plavix 75    Statins for secondary stroke prevention and hyperlipidemia, if present:   Statins: Atorvastatin- 40 mg daily    Aggressive risk factor modification: HTN, HLD     Rehab efforts: The patient has been evaluated by a stroke team provider and the therapy needs have been fully considered based off the presenting complaints and exam findings. The following therapy evaluations are needed: PT evaluate and treat, OT evaluate and treat, SLP evaluate and treat, PM&R evaluate for appropriate placement    Diagnostics ordered/pending: none    VTE prophylaxis: Heparin 5000 units SQ every 8 hours    BP parameters: Infarct: Post sucessful thrombectomy, SBP <140          Cytotoxic cerebral edema  Area of cytotoxic cerebral edema identified when reviewing brain imaging in the territory of the L internal carotid artery. There is no  mass effect associated with it. We will continue to monitor the patients clinical exam for any worsening of symptoms which may indicate expansion of the stroke or the area of the edema resulting in the clinical change. The pattern is suggestive of KIM vs ESUS etiology.        Mixed hyperlipidemia  Stroke risk factor  Lipid panel pending  Home medications unknown  Recommend starting atorvastatin 40 mg daily    Essential hypertension  Stroke risk factor  SBP <140 for successful        Teresa Vaughan is a 79 y.o. female with PMHx of HTN and HLD who presented to [facility] with aphasia and RSW. Patient went to ED after being found outside wandering around by her neighbors. She was seen via telemedicine and was not a candidate for tPA . CTA at OSH revealed L ICA occlusion. Patient transferred to [facility] for possible IR intervention. Angioplasty and Aspiration thrombectomy completed at that time. Patient primarily American speaking and aphasic.     4/25: Patient stepped down to stroke primary service. CTA head and neck complete- L ICA re occluded but reconstitutes distally. Neuro exam unchanged.     STROKE DOCUMENTATION   Acute Stroke Times   Last Known Normal Date: 04/21/20  Last Known Normal Time: 1800  Symptom Onset Date: (unknown)  Symptom Onset Time: (unknown)  Stroke Team Called Date: 04/22/20  Stroke Team Called Time: 1415  Stroke Team Arrival Date: 04/22/20  Stroke Team Arrival Time: 1415  CT Interpretation Time: 1425  Decision to Treat Time for Alteplase: (decision made at OSH via telemedicine)  Decision to Treat Time for IR: 1425    NIH Scale:  1a. Level of Consciousness: 0-->Alert, keenly responsive  1b. LOC Questions: 2-->Answers neither question correctly  1c. LOC Commands: 2-->Performs neither task correctly  2. Best Gaze: 0-->Normal  3. Visual: 0-->No visual loss  4. Facial Palsy: 0-->Normal symmetrical movements  5a. Motor Arm, Left: 3-->No effort against gravity, limb falls  5b. Motor Arm,  Right: 1-->Drift, limb holds 90 (or 45) degrees, but drifts down before full 10 secs, does not hit bed or other support  6a. Motor Leg, Left: 2-->Some effort against gravity, leg falls to bed by 5 secs, but has some effort against gravity  6b. Motor Leg, Right: 2-->Some effort against gravity, leg falls to bed by 5 secs, but has some effort against gravity  7. Limb Ataxia: 0-->Absent  8. Sensory: 0-->Normal, no sensory loss  9. Best Language: 3-->Mute, global aphasia, no usable speech or auditory comprehension  10. Dysarthria: 1-->Mild-to-moderate dysarthria, patient slurs at least some words and, at worst, can be understood with some difficulty  11. Extinction and Inattention (formerly Neglect): 0-->No abnormality  Total (NIH Stroke Scale): 16       Modified [place]    Jennifer Coma Scale:    ABCD2 Score:    [id number] Score:   HAS -BLED Score:   ICH Score:   Hunt & Dahl Classification:      Hemorrhagic change of an Ischemic Stroke: Does this patient have an ischemic stroke with hemorrhagic changes? No         Subjective:     Interval History:   Patient stepped down to stroke primary service. CTA head and neck complete- L ICA re occluded but reconstitutes distally. Neuro exam unchanged.     Review of Systems   Constitutional: Negative for chills and fever.   Respiratory: Negative for cough.    Gastrointestinal: Negative for nausea and vomiting.   Neurological: Positive for facial asymmetry, speech difficulty and weakness. Negative for numbness.   Psychiatric/Behavioral: Positive for confusion and decreased concentration. Negative for agitation.     Objective:     Vital Signs (Most Recent):  Temp: 100.2 °F (37.9 °C) (04/25/20 1517)  Pulse: 78 (04/25/20 1600)  Resp: 18 (04/25/20 1517)  BP: 132/63 (04/25/20 1517)  SpO2: (!) 93 % (04/25/20 1517)    Vital Signs Range (Last 24H):  Temp:  [97.4 °F (36.3 °C)-100.2 °F (37.9 °C)]   Pulse:  [58-80]   Resp:  [16-18]   BP: (131-149)/()   SpO2:  [93 %-97 %]     Physical  Exam   Constitutional: She appears well-developed and well-nourished. No distress.   HENT:   Head: Normocephalic and atraumatic.   Eyes: EOM are normal.   Cardiovascular: Normal rate.   Pulmonary/Chest: Effort normal. No respiratory distress.   Neurological: She is alert.   Skin: Skin is warm and dry.   Vitals reviewed.      Neurological Exam:   LOC: alert  Attention Span: poor  Language: Global aphasia  Articulation: Untestable due to severe aphasia   Orientation: Untestable due to severe aphasia   Visual Fields: Full  EOM (CN III, IV, VI): Full/intact  Facial Movement (CN VII): symmetric   Motor: Arm left  Normal 5/5  Leg left  Normal 3/5  Arm right  Normal 3/5  Leg right Paresis: 4/5  Sensation: Intact to light touch, temperature and vibration  Tone: Normal tone throughout      Laboratory:  CMP:   Recent Labs   Lab 04/25/20  0322   CALCIUM 8.7   ALBUMIN 3.1*   PROT 6.6      K 3.8   CO2 24      BUN 12   CREATININE 0.8   ALKPHOS 66   ALT 14   AST 26   BILITOT 0.7     CBC:   Recent Labs   Lab 04/25/20  0322   WBC 9.56   RBC 3.93*   HGB 11.2*   HCT 36.0*      MCV 92   MCH 28.5   MCHC 31.1*     Lipid Panel:   Recent Labs   Lab 04/22/20  1720   CHOL 220*   LDLCALC 135.2   HDL 61   TRIG 119     Coagulation: No results for input(s): PT, INR, APTT in the last 168 hours.  Hgb A1C:   Recent Labs   Lab 04/22/20  1720   HGBA1C 5.6     TSH:   Recent Labs   Lab 04/22/20  1720   TSH 3.990       Diagnostic Results:      Brain imaging:    MRI brain w/o contrast 4/25/20  _    Prominent areas of acute ischemia/infarct involving the left cerebral hemisphere as detailed above with additional focal areas involving the left cerebellum and a focus of the right cerebellum.    Focal area of signal hypointensity of the left thalamus may relate to an area of focal hemorrhagic conversion a large degree of hemorrhagic conversion is not appreciated.    Signal abnormality of the left internal carotid artery likely relates to  diminished flow or potentially lack of flow, correlation with the recent angiographic procedure is recommended.      CT Head. Date: 04/22/20  Ill-defined region of decreased attenuation left insula anteriorly concerning for acute/recent infarction.  No significant mass effect or evidence for hemorrhagic conversion.    Vessel Imaging:  CTA OSH. Date: 04/22/20  L ICA occlusion          Ester Henry NP  [facility]  Department of Vascular Neurology   [facility]

## 2020-04-27 PROBLEM — I65.21 STENOSIS OF RIGHT INTERNAL CAROTID ARTERY: Status: ACTIVE | Noted: 2020-04-27

## 2020-04-27 PROBLEM — R13.10 DYSPHAGIA: Status: ACTIVE | Noted: 2020-04-27

## 2020-04-27 LAB
ALBUMIN SERPL BCP-MCNC: 3 G/DL (ref 3.5–5.2)
ALP SERPL-CCNC: 73 U/L (ref 55–135)
ALT SERPL W/O P-5'-P-CCNC: 17 U/L (ref 10–44)
ANA SER QL IF: POSITIVE
ANA TITR SER IF: NORMAL {TITER}
ANION GAP SERPL CALC-SCNC: 15 MMOL/L (ref 8–16)
AST SERPL-CCNC: 32 U/L (ref 10–40)
BASOPHILS # BLD AUTO: 0.06 K/UL (ref 0–0.2)
BASOPHILS NFR BLD: 0.8 % (ref 0–1.9)
BILIRUB SERPL-MCNC: 0.4 MG/DL (ref 0.1–1)
BUN SERPL-MCNC: 21 MG/DL (ref 8–23)
CALCIUM SERPL-MCNC: 9.2 MG/DL (ref 8.7–10.5)
CHLORIDE SERPL-SCNC: 106 MMOL/L (ref 95–110)
CO2 SERPL-SCNC: 20 MMOL/L (ref 23–29)
CREAT SERPL-MCNC: 0.8 MG/DL (ref 0.5–1.4)
DIFFERENTIAL METHOD: ABNORMAL
EOSINOPHIL # BLD AUTO: 0.2 K/UL (ref 0–0.5)
EOSINOPHIL NFR BLD: 2.1 % (ref 0–8)
ERYTHROCYTE [DISTWIDTH] IN BLOOD BY AUTOMATED COUNT: 13.3 % (ref 11.5–14.5)
EST. GFR  (AFRICAN AMERICAN): >60 ML/MIN/1.73 M^2
EST. GFR  (NON AFRICAN AMERICAN): >60 ML/MIN/1.73 M^2
GLUCOSE SERPL-MCNC: 85 MG/DL (ref 70–110)
HCT VFR BLD AUTO: 33.9 % (ref 37–48.5)
HGB BLD-MCNC: 10.6 G/DL (ref 12–16)
IMM GRANULOCYTES # BLD AUTO: 0.01 K/UL (ref 0–0.04)
IMM GRANULOCYTES NFR BLD AUTO: 0.1 % (ref 0–0.5)
LYMPHOCYTES # BLD AUTO: 1.5 K/UL (ref 1–4.8)
LYMPHOCYTES NFR BLD: 20.7 % (ref 18–48)
MAGNESIUM SERPL-MCNC: 2.3 MG/DL (ref 1.6–2.6)
MCH RBC QN AUTO: 28.3 PG (ref 27–31)
MCHC RBC AUTO-ENTMCNC: 31.3 G/DL (ref 32–36)
MCV RBC AUTO: 90 FL (ref 82–98)
MONOCYTES # BLD AUTO: 0.8 K/UL (ref 0.3–1)
MONOCYTES NFR BLD: 10.9 % (ref 4–15)
NEUTROPHILS # BLD AUTO: 4.8 K/UL (ref 1.8–7.7)
NEUTROPHILS NFR BLD: 65.4 % (ref 38–73)
NRBC BLD-RTO: 0 /100 WBC
PHOSPHATE SERPL-MCNC: 3.9 MG/DL (ref 2.7–4.5)
PLATELET # BLD AUTO: 239 K/UL (ref 150–350)
PMV BLD AUTO: 11.9 FL (ref 9.2–12.9)
POCT GLUCOSE: 123 MG/DL (ref 70–110)
POCT GLUCOSE: 144 MG/DL (ref 70–110)
POTASSIUM SERPL-SCNC: 3.6 MMOL/L (ref 3.5–5.1)
PROT SERPL-MCNC: 7.3 G/DL (ref 6–8.4)
RBC # BLD AUTO: 3.75 M/UL (ref 4–5.4)
SODIUM SERPL-SCNC: 141 MMOL/L (ref 136–145)
WBC # BLD AUTO: 7.26 K/UL (ref 3.9–12.7)

## 2020-04-27 PROCEDURE — 85025 COMPLETE CBC W/AUTO DIFF WBC: CPT

## 2020-04-27 PROCEDURE — 99232 SBSQ HOSP IP/OBS MODERATE 35: CPT | Mod: ,,, | Performed by: NURSE PRACTITIONER

## 2020-04-27 PROCEDURE — 20600001 HC STEP DOWN PRIVATE ROOM

## 2020-04-27 PROCEDURE — 25000003 PHARM REV CODE 250: Performed by: INTERNAL MEDICINE

## 2020-04-27 PROCEDURE — 92507 TX SP LANG VOICE COMM INDIV: CPT

## 2020-04-27 PROCEDURE — 83735 ASSAY OF MAGNESIUM: CPT

## 2020-04-27 PROCEDURE — 97533 SENSORY INTEGRATION: CPT

## 2020-04-27 PROCEDURE — 84100 ASSAY OF PHOSPHORUS: CPT

## 2020-04-27 PROCEDURE — 99233 PR SUBSEQUENT HOSPITAL CARE,LEVL III: ICD-10-PCS | Mod: ,,, | Performed by: PSYCHIATRY & NEUROLOGY

## 2020-04-27 PROCEDURE — 36415 COLL VENOUS BLD VENIPUNCTURE: CPT

## 2020-04-27 PROCEDURE — 97530 THERAPEUTIC ACTIVITIES: CPT

## 2020-04-27 PROCEDURE — 99233 SBSQ HOSP IP/OBS HIGH 50: CPT | Mod: ,,, | Performed by: PSYCHIATRY & NEUROLOGY

## 2020-04-27 PROCEDURE — 92526 ORAL FUNCTION THERAPY: CPT

## 2020-04-27 PROCEDURE — 80053 COMPREHEN METABOLIC PANEL: CPT

## 2020-04-27 PROCEDURE — 63600175 PHARM REV CODE 636 W HCPCS: Performed by: INTERNAL MEDICINE

## 2020-04-27 PROCEDURE — 99232 PR SUBSEQUENT HOSPITAL CARE,LEVL II: ICD-10-PCS | Mod: ,,, | Performed by: NURSE PRACTITIONER

## 2020-04-27 PROCEDURE — 97535 SELF CARE MNGMENT TRAINING: CPT

## 2020-04-27 RX ADMIN — HEPARIN SODIUM 5000 UNITS: 5000 INJECTION INTRAVENOUS; SUBCUTANEOUS at 09:04

## 2020-04-27 RX ADMIN — STANDARDIZED SENNA CONCENTRATE AND DOCUSATE SODIUM 1 TABLET: 8.6; 5 TABLET ORAL at 08:04

## 2020-04-27 RX ADMIN — STANDARDIZED SENNA CONCENTRATE AND DOCUSATE SODIUM 1 TABLET: 8.6; 5 TABLET ORAL at 09:04

## 2020-04-27 RX ADMIN — HEPARIN SODIUM 5000 UNITS: 5000 INJECTION INTRAVENOUS; SUBCUTANEOUS at 04:04

## 2020-04-27 RX ADMIN — ASPIRIN 81 MG: 81 TABLET, COATED ORAL at 09:04

## 2020-04-27 RX ADMIN — POLYETHYLENE GLYCOL 3350 17 G: 17 POWDER, FOR SOLUTION ORAL at 09:04

## 2020-04-27 RX ADMIN — ATORVASTATIN CALCIUM 40 MG: 20 TABLET, FILM COATED ORAL at 09:04

## 2020-04-27 RX ADMIN — CLOPIDOGREL BISULFATE 75 MG: 75 TABLET ORAL at 09:04

## 2020-04-27 RX ADMIN — HEPARIN SODIUM 5000 UNITS: 5000 INJECTION INTRAVENOUS; SUBCUTANEOUS at 02:04

## 2020-04-27 NOTE — ASSESSMENT & PLAN NOTE
Stroke risk factor  CTA 4/25 demonstrated L ICA and bifurcation, as well as R ICA atherosclerotic plaquing with approx 70% stenosis  R ICA stenosis currently appears asymptomatic.  Plans for Ambulatory referral to Vascular Surgery at discharge, though pt would need to recover more before determining surgical candidacy.   DAPT, statin

## 2020-04-27 NOTE — PLAN OF CARE
Problem: Physical Therapy Goal  Goal: Physical Therapy Goal  Description  Goals to be met by: 5/3     Patient will increase functional independence with mobility by performin. Supine to sit with MInimal Assistance  2. Sit to supine with MInimal Assistance  3. Sit to stand transfer with Moderate Assistance  4. Bed to chair transfer with Moderate Assistance using squat pivot technique  5. Gait  x 5 feet with Maximum Assistance using least restrictive device or no AD.    6. Sitting at edge of bed x10 minutes with Stand-by Assistance while performing a dynamic reaching task with no UE support on bed to prepare for functional activities in sitting  7. Lower extremity exercise program x15 reps per handout, with assistance as needed to improve strength and neuromuscular control to increase independence with mobility.     Outcome: Ongoing, Progressing    Pt able to tolerate treatment fair this date but with noted deficits including global aphasia, weakness, impaired balance and inability to actively participate during session. Goals remain appropriate. Will continue to follow while in house.     Elva Otto, PT, DPT  2020

## 2020-04-27 NOTE — PLAN OF CARE
DANA received a message from DANA Harden that she spoke with Yuri ([phone]) at [facility] and he reports that if pt therapy recommendations change to rehab, they would like to receive updates to review her case for possible placement.    Tamy Cerna, DAVID  [facility]  Ext. [phone]

## 2020-04-27 NOTE — PT/OT/SLP PROGRESS
"Physical Therapy Treatment    Patient Name:  Teresa Vaughan   MRN:  [id number]  Admitting Diagnosis:  Stroke due to occlusion of left carotid artery   Recent Surgery: * No surgery found *    Admit Date: 4/22/2020  Length of Stay: 5 days    Recommendations:     Discharge Recommendations:  nursing facility, skilled   Discharge Equipment Recommendations: (TBD, pending progress)   Barriers to discharge: Decreased caregiver support at current functional status     Assessment:     Teresa Vaughan is a 79 y.o. female admitted with a medical diagnosis of Stroke due to occlusion of left carotid artery.  She presents with the following impairments/functional limitations:  weakness, impaired functional mobilty, impaired cognition, decreased safety awareness, decreased coordination, impaired endurance, gait instability, impaired fine motor, impaired balance, decreased upper extremity function, decreased lower extremity function, impaired self care skills, decreased ROM. Pt supine in bed at beginning of session, willing to work with therapy (no signs of resistance). Pt primarily limited by global aphasia but also presents with deficits including decreased insight/safety awareness, weakness, poor sitting balance/righting reactions and inability to consistently participate during session. Pt required totalA x 2 for bed mobility and partial standing this date with a "floppy" like tone. Pt continues to be SNF appropriate at this time to maximize functional potential, will continue to follow while in house.     *Translation line used during session*     Rehab Prognosis: Fair; patient would benefit from acute skilled PT services to address these deficits and reach maximum level of function.     Recent Surgery: * No surgery found *      Plan:     During this hospitalization, patient to be seen 3 x/week to address the identified rehab impairments via gait training, therapeutic activities, therapeutic exercises, neuromuscular re-education and " progress toward the following goals:    · Plan of Care Expires:  05/22/20    Subjective   Communicated with RN prior to session.  Patient found supine upon PT entry to room, agreeable to evaluation.     Chief Complaint: None stated  Patient/Family Comments/goals: Unknown at this time  Pain/Comfort:  · Pain Rating 1: (Pt aphasic but no signs of pain during session)  · Pain Rating Post-Intervention 1: (No change )      Objective:   Patient found with: bed alarm, blood pressure cuff, pulse ox (continuous), SCD, pressure relief boots, telemetry, peripheral IV, PureWick   General Precautions: Standard, Cardiac aspiration, aphasia, fall   Orthopedic Precautions:N/A   Braces: N/A   Oxygen Device: Room Air    Outcome Measures:  AM-PAC 6 CLICK MOBILITY  Turning over in bed (including adjusting bedclothes, sheets and blankets)?: 2  Sitting down on and standing up from a chair with arms (e.g., wheelchair, bedside commode, etc.): 2  Moving from lying on back to sitting on the side of the bed?: 2  Moving to and from a bed to a chair (including a wheelchair)?: 1  Need to walk in hospital room?: 1  Climbing 3-5 steps with a railing?: 1  Basic Mobility Total Score: 9     Cognition:   · Pt globally aphasic; not oriented to self, however, alert during session  · Command following: Inattentive, unable to follow simple commands this date    Functional Mobility:  Bed Mobility:   · Rolling/Turning to Left: total assistance  · Rolling/Turning to Right: total assistance  · Scooting to HOB via drawsheet: total assistance and 2 persons  · Supine to Sit: total assistance and 2 persons; from L side of bed  · Scooting anteriorly to EOB to have both feet planted on floor: total assistance and 2 persons  · Sit to Supine: total assistance and 2 persons    Sitting Balance at Edge of Bed:   Assistance Level Required: Total Assistance   Postural deviations noted: slouched posture, forward head and poor midline awareness   Encouraged: Upright  posture and neutral alignment; pt unable to attain due to tendency to push to the R   Comments: Pt with floppy trunk in sitting, no righting reactions noted    Transfers:   · Sit <> Stand Transfer: total assistance and of 2 persons x 2 trials, pt able to partially stand but with severe truncal deficits. Only able to tolerate for 10 seconds or less.   · Stand <> Sit Transfer: total assistance and of 2 persons   · Further mobility deferred due to truncal weakness and lack of control       Therapeutic Activities & Exercises:   Pt educated on: PT role/POC; safety with mobility; benefits of OOB activities; pt unable to verbalize understanding at this time.       Patient left HOB elevated, with head in midline, neutral pelvis & heels floated for skin protection with all lines intact, call button in reach and RN notified    GOALS:   Multidisciplinary Problems     Physical Therapy Goals        Problem: Physical Therapy Goal    Goal Priority Disciplines Outcome Goal Variances Interventions   Physical Therapy Goal     PT, PT/OT Ongoing, Progressing     Description:  Goals to be met by: 5/3     Patient will increase functional independence with mobility by performin. Supine to sit with MInimal Assistance  2. Sit to supine with MInimal Assistance  3. Sit to stand transfer with Moderate Assistance  4. Bed to chair transfer with Moderate Assistance using squat pivot technique  5. Gait  x 5 feet with Maximum Assistance using least restrictive device or no AD.    6. Sitting at edge of bed x10 minutes with Stand-by Assistance while performing a dynamic reaching task with no UE support on bed to prepare for functional activities in sitting  7. Lower extremity exercise program x15 reps per handout, with assistance as needed to improve strength and neuromuscular control to increase independence with mobility.                    Time Tracking:     PT Received On: 20  PT Start Time: 1034     PT Stop Time: 1113  PT Total  Time (min): 39 min       Billable Minutes:   · Therapeutic Activity 39 min    Treatment Type: Treatment  PT/PTA: PT       Elva Otto PT, DPT  4/27/2020

## 2020-04-27 NOTE — PLAN OF CARE
04/27/20 1219   Discharge Reassessment   Assessment Type Discharge Planning Reassessment   Provided patient/caregiver education on the expected discharge date and the discharge plan Yes  (By care team)   Discharge Plan A Skilled Nursing Facility   Discharge Plan B Skilled Nursing Facility   DME Needed Upon Discharge  none   Anticipated Discharge Disposition SNF     Julie Haase RN  Case Management [phone]

## 2020-04-27 NOTE — PLAN OF CARE
Problem: Occupational Therapy Goal  Goal: Occupational Therapy Goal  Description  Goals set on 4/23 with expiration date 5/8:  Patient will increase functional independence with ADLs by performing:    Supine <> Sit with Min Assistance.  Feeding with Min  Assistance.  Grooming while standing at sink with Min  Assistance.  UB Dressing with Min  Assistance.  LB Dressing with Min Assistance.  Step transfer with Min Assistance with DME as needed.  Pt will demonstrate understanding of education provided regarding energy conservation and task modification through teach-back method.   Patient will increase functional independence with ADLs by performing:  Patient will demonstrate assistance as needed with HEP for R UE weight bearing.      Patient's family / caregiver will demonstrate assistance as needed and safety with assisting patient with self-care skills and functional mobility.      Patient's family / caregiver will demonstrate assistance as needed with HEP, A/AAROM and changes in bed positioning.      Outcome: Ongoing, Progressing       Goals Remain appropriate.  Cont OT POC  CHANDRA Siddiqi  04/27/2020

## 2020-04-27 NOTE — PLAN OF CARE
Patient remains free of falls or injury. Patient does not appear to be in pain. Patient working with PT/OT/SLP. Awaiting SNF or rehab placement. Plan of care reviewed with patient.

## 2020-04-27 NOTE — ASSESSMENT & PLAN NOTE
- went to IR for angioplasty and aspiration thrombectomy.   - MRI demonstrated involvment of L MCA, L PCA and L cerebellar aa with a R cerebellar finding as well.     - Related to prolonged/acute hospital course.     Recommendations  -  Encourage mobility, OOB in chair at least 3 hours per day, and early ambulation as appropriate  -  PT/OT evaluate and treat  -  Pain management  -  Monitor for and prevent skin breakdown and pressure ulcers  · Early mobility, repositioning/weight shifting every 20-30 minutes when sitting, turn patient every 2 hours, proper mattress/overlay and chair cushioning, pressure relief/heel protector boots  -  DVT prophylaxis    -  Reviewed discharge options (IP rehab, SNF, HH therapy, and OP therapy)

## 2020-04-27 NOTE — PLAN OF CARE
Care plan reviewed  with patient, no evidence of learning noted. Oriented to self only, when her name is said, pt looks in direction of whom said her name. Global aphasia noted, non-verbal. FACEs pain scale 0/10 at rest and activity. External female catheter in place. Incontinent of bowel x1 this shift. PT/OT following. NADN. Neurocheck unchanged. R-sided hemiparesis noted. Turned/ repositioned as needed. Placed Waffle mattress on bed. NSR on telemetry. PLAN= SNF. Family members updated on care plan via telephone. SW following.

## 2020-04-27 NOTE — SUBJECTIVE & OBJECTIVE
Neurologic Chief Complaint: Right-sided weakness    Subjective:     Interval History: Patient is seen for follow-up neurological assessment and treatment recommendations: SEBASTIÁNEON. Patient remains stable; monitoring BP. Pending SNF placement. Labs qOD.    HPI, Past Medical, Family, and Social History remains the same as documented in the initial encounter.     Review of Systems   Constitutional: Negative for fatigue and fever.   Eyes: Positive for visual disturbance. Negative for discharge.   Neurological: Positive for facial asymmetry, speech difficulty and weakness.   Psychiatric/Behavioral: Positive for confusion. Negative for decreased concentration.     Scheduled Meds:   aspirin  81 mg Oral Daily    atorvastatin  40 mg Oral Daily    clopidogreL  75 mg Oral Daily    heparin (porcine)  5,000 Units Subcutaneous Q8H    polyethylene glycol  17 g Oral Daily    senna-docusate 8.6-50 mg  1 tablet Oral BID     Continuous Infusions:  PRN Meds:acetaminophen, ondansetron    Objective:     Vital Signs (Most Recent):  Temp: 97.7 °F (36.5 °C) (04/27/20 0719)  Pulse: 67 (04/27/20 0721)  Resp: 16 (04/27/20 0719)  BP: 126/65 (04/27/20 0719)  SpO2: 97 % (04/27/20 0719)  BP Location: Left arm    Vital Signs Range (Last 24H):  Temp:  [97.5 °F (36.4 °C)-99 °F (37.2 °C)]   Pulse:  [64-78]   Resp:  [14-18]   BP: (116-148)/(59-79)   SpO2:  [93 %-97 %]   BP Location: Left arm    Physical Exam   Constitutional: She appears well-developed and well-nourished. No distress.   HENT:   Head: Normocephalic and atraumatic.   Eyes: Conjunctivae are normal. No scleral icterus.   Cardiovascular: Normal rate.   Pulmonary/Chest: Effort normal. No respiratory distress.   Musculoskeletal: She exhibits no edema or deformity.   Neurological: She is alert.   Skin: Skin is warm and dry.   Psychiatric: Cognition and memory are impaired. She is attentive.       Neurological Exam:   LOC: alert  Attention Span: Good   Language: Global aphasia, expressive >  receptive  Articulation: Dysarthria  Orientation: Untestable due to severe aphasia   Visual Fields: Hemianopsia right  EOM (CN III, IV, VI): Gaze preference  left and but able to cross midline  Facial Movement (CN VII): Lower facial weakness on the Right  Motor: Arm left  Paresis: 4/5  Leg left  Paresis: 4/5  Arm right  Plegia 0/5  Leg right Plegia 0/5  Tone: Flaccid  RUE and RLE     Laboratory:  CMP:   Recent Labs   Lab 04/27/20  0357   CALCIUM 9.2   ALBUMIN 3.0*   PROT 7.3      K 3.6   CO2 20*      BUN 21   CREATININE 0.8   ALKPHOS 73   ALT 17   AST 32   BILITOT 0.4     BMP:   Recent Labs   Lab 04/27/20  0357      K 3.6      CO2 20*   BUN 21   CREATININE 0.8   CALCIUM 9.2     CBC:   Recent Labs   Lab 04/27/20  0357   WBC 7.26   RBC 3.75*   HGB 10.6*   HCT 33.9*      MCV 90   MCH 28.3   MCHC 31.3*     Lipid Panel:   Recent Labs   Lab 04/22/20  1720   CHOL 220*   LDLCALC 135.2   HDL 61   TRIG 119     Coagulation: No results for input(s): PT, INR, APTT in the last 168 hours.  Platelet Aggregation Study: No results for input(s): PLTAGG, PLTAGINTERP, PLTAGREGLACO, ADPPLTAGGREG in the last 168 hours.  Hgb A1C:   Recent Labs   Lab 04/22/20  1720   HGBA1C 5.6     TSH:   Recent Labs   Lab 04/22/20  1720   TSH 3.990       Diagnostic Results     Brain imaging:     MRI Brain w/o contrast 4/25/20  _  Prominent areas of acute ischemia/infarct involving the left cerebral hemisphere as detailed above with additional focal areas involving the left cerebellum and a focus of the right cerebellum.  Focal area of signal hypointensity of the left thalamus may relate to an area of focal hemorrhagic conversion a large degree of hemorrhagic conversion is not appreciated.  Signal abnormality of the left internal carotid artery likely relates to diminished flow or potentially lack of flow, correlation with the recent angiographic procedure is recommended.     CT Head. Date: 04/22/20  Ill-defined region of  decreased attenuation left insula anteriorly concerning for acute/recent infarction.  No significant mass effect or evidence for hemorrhagic conversion.       Vessel Imaging:    CTA Head/Neck 4/25/20  CT head: Evolving moderate to large regions of left MCA and PCA territory infarction as seen on prior MRI.  There is increased mass effect and edema particularly in left parietal and occipital components with localized mass effect without significant midline shift.  Intermediate density within the left temporal component concerning for component of petechial hemorrhage.  No evidence for extra-axial hemorrhage or hydrocephalus.  CTA head and neck: Continued occlusion left carotid artery and carotid bifurcation with reconstituted left distal cavernous ICA which may be retrograde or flow via the ophthalmic artery.  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  Taper configuration left distal P2 segment of the PCA concerning for stenosis and distal inclusion.  Atherosclerotic plaquing right carotid bifurcation proximal ICA with approximately 70 percent right proximal ICA stenosis by NASCET criteria.  Degenerative changes cervical spine with mild superior endplate C3 vertebral body deformity concerning for fracture overall age indeterminate and likely remote.    Cerebral Angiogram 4/22/20 -- Final read pending  Left ICA occlusion was treated using Avaitor Balloon angioplasty. Left terminal ICA occlusion was reperfused with TICI 2A score on there left MCA using aspiration catheter.     CTA OSH. Date: 04/22/20  L ICA occlusion        Cardiac imaging     TTE 4/23/20  · Normal left ventricular systolic function. The estimated ejection fraction is 70%.  · No wall motion abnormalities.  · Indeterminate left ventricular diastolic function.  · Normal right ventricular systolic function.  · The estimated PA systolic pressure is 32 mmHg.  · Normal  central venous pressure (3 mmHg).  · Mild left atrial enlargement.

## 2020-04-27 NOTE — PROGRESS NOTES
[facility]  Physical Medicine & Rehab  Progress Note    Patient Name: Teresa Vaughan  MRN: [id number]  Admission Date: 4/22/2020  Length of Stay: 5 days  Attending Physician: Kvng Decker DO    Subjective:     Principal Problem:Stroke due to occlusion of left carotid artery    Hospital Course:   4/23/20: Evaluated by PT & OT. Bed mobility modA- maxA x 2. Sit to stand maxA. Grooming maxA. LBD totalA.   4/24/20: Participated w/ PT & OT. Bed mobility totalA- totalA x 2 ppl. Grooming totalA.     Interval History 4/27/2020:  Patient is seen for follow-up rehab evaluation and recommendations: participating with therapy. Stepped down to floor.     HPI, Past Medical, Family, and Social History remains the same as documented in the initial encounter.    Scheduled Medications:    aspirin  81 mg Oral Daily    atorvastatin  40 mg Oral Daily    clopidogreL  75 mg Oral Daily    heparin (porcine)  5,000 Units Subcutaneous Q8H    polyethylene glycol  17 g Oral Daily    senna-docusate 8.6-50 mg  1 tablet Oral BID       Diagnostic Results: Labs: Reviewed  ECG: Reviewed  CT: Reviewed    PRN Medications: acetaminophen, ondansetron    Review of Systems   Reason unable to perform ROS: Aphasia, nonverbal    Constitutional: Positive for activity change.   Neurological: Positive for weakness.     Objective:     Vital Signs (Most Recent):  Temp: 97.7 °F (36.5 °C) (04/27/20 0719)  Pulse: 67 (04/27/20 0721)  Resp: 16 (04/27/20 0719)  BP: 126/65 (04/27/20 0719)  SpO2: 97 % (04/27/20 0719)    Vital Signs (24h Range):  Temp:  [97.5 °F (36.4 °C)-99 °F (37.2 °C)] 97.7 °F (36.5 °C)  Pulse:  [64-78] 67  Resp:  [14-18] 16  SpO2:  [93 %-97 %] 97 %  BP: (116-148)/(59-79) 126/65     Physical Exam   Constitutional: She appears well-developed and well-nourished.   HENT:   Head: Normocephalic and atraumatic.   Eyes: Pupils are equal, round, and reactive to light. EOM are normal.   Neck: Normal range of motion. Neck supple.    Pulmonary/Chest: Effort normal. No respiratory distress.   Abdominal: Normal appearance.   Musculoskeletal: She exhibits no tenderness or deformity.   Neurological: She is alert.   - L gaze   - Aphasia  - not following commands  - spontaneously moving L side   Skin: Skin is warm and dry.   Psychiatric: She has a normal mood and affect. Her behavior is normal.   Nursing note and vitals reviewed.          Assessment/Plan:      * Stroke due to occlusion of left carotid artery  - went to IR for angioplasty and aspiration thrombectomy.   - MRI demonstrated involvment of L MCA, L PCA and L cerebellar aa with a R cerebellar finding as well.     - Related to prolonged/acute hospital course.     Recommendations  -  Encourage mobility, OOB in chair at least 3 hours per day, and early ambulation as appropriate  -  PT/OT evaluate and treat  -  Pain management  -  Monitor for and prevent skin breakdown and pressure ulcers  · Early mobility, repositioning/weight shifting every 20-30 minutes when sitting, turn patient every 2 hours, proper mattress/overlay and chair cushioning, pressure relief/heel protector boots  -  DVT prophylaxis    -  Reviewed discharge options (IP rehab, SNF, HH therapy, and OP therapy)    Essential hypertension  - improved    Recommend Inpatient Rehab.      Sherice Blanton NP  Department of Physical Medicine & Rehab   [facility]

## 2020-04-27 NOTE — PROGRESS NOTES
[facility]  Vascular Neurology  Comprehensive Stroke Center  Progress Note    Assessment/Plan:     * Stroke due to occlusion of left carotid artery  Teresa Vaughan is a 79 y.o. female with PMHx of HTN and HLD who presented to OSH with R-sided weakness and aphasia. She was seen via telemedicine and not eligible for tPA due to timeline. CTA obtained at OSH with L ICA occlusion and patient transferred [place] [facility] [place]. CT head on arrival to C with ischemic changes in L insula. Patient went to IR for angioplasty and aspiration thrombectomy with subsequent TICI 2A reperfusion. MRI Brain demonstrated acute infarcts involving L MCA, L PCA, and MAICO cerebellum. TTE showed mild L atrial enlargement; otherwise findings WNL. Of note, patient tested negative for COVID-19.    Repeat CTA Head/Neck 4/25 showed re-occlusion at the L carotid bifurcation as well as small degree of petechial hemorrhage. Stroke etiology suspected ESUS; plans for 30-day cardiac event monitoring following SNF discharge.      Antithrombotics for secondary stroke prevention: Antiplatelets: Aspirin: 81 mg daily, Plavix 75mg Daily  Statins for secondary stroke prevention and hyperlipidemia, if present:   Statins: Atorvastatin- 40 mg daily  Aggressive risk factor modification: HTN, HLD, R ICA stenosis  Rehab efforts: The patient has been evaluated by a stroke team provider and the therapy needs have been fully considered based off the presenting complaints and exam findings. The following therapy evaluations are needed: PT evaluate and treat, OT evaluate and treat, SLP evaluate and treat - Dispo SNF  Diagnostics ordered/pending: none  VTE prophylaxis: Heparin 5000 units SQ every 8 hours, place SCDs  BP parameters: Infarct, Carotid stenosis: SBP <160    Stenosis of right internal carotid artery  Stroke risk factor  CTA 4/25 demonstrated L ICA and bifurcation, as well as R ICA atherosclerotic plaquing with approx 70%  stenosis  R ICA stenosis currently appears asymptomatic.  Plans for Ambulatory referral to Vascular Surgery at discharge, though pt would need to recover more before determining surgical candidacy.   DAPT, statin    Dysphagia  2/2 stroke - also in the setting of severe aphasia  SLP cleared patient for puree diet, nectar-thickened liquids  Continue aggressive SLP    Essential hypertension  Stroke risk factor  SBP < 160 in the setting of MAICO ICA atherosclerosis  Home meds unknown though pt's BP at goal/borderline low over last 24 hrs.  Will continue monitoring    Cytotoxic cerebral edema  Upon review of brain imaging, moderate area of cytotoxic cerebral edema identified in the territory of the Left middle cerebral artery. There is associated mass effect.   We will continue to monitor the patients clinical exam for any worsening of symptoms which may indicate expansion of the stroke or the area of edema resulting in such clinical change.   Pattern is suggestive of an embolic etiology.    Mixed hyperlipidemia  Stroke risk factor    Home medications unknown  Atorvastatin 40 mg daily         Teresa Vaughan is a 79 y.o. female with PMHx of HTN and HLD who presented to [facility] with aphasia and RSW. Patient went to ED after being found outside wandering around by her neighbors. She was seen via telemedicine and was not a candidate for tPA . CTA at OSH revealed L ICA occlusion. Patient transferred to [facility] for possible IR intervention. Angioplasty and Aspiration thrombectomy completed at that time. Patient primarily Greek speaking and aphasic.     4/25: Patient stepped down to stroke primary service. CTA head and neck complete- L ICA re occluded but reconstitutes distally. Neuro exam unchanged.   4/26: Neuro exam stable. Stroke etiology likely ESUS. Patient to discharge with 30 day event monitor.  4/27: Patient remains stable; monitoring BP. Pending SNF placement. Labs qOD.    STROKE DOCUMENTATION    Acute Stroke Times   Last Known Normal Date: 04/21/20  Last Known Normal Time: 1800  Symptom Onset Date: (unknown)  Symptom Onset Time: (unknown)  Stroke Team Called Date: 04/22/20  Stroke Team Called Time: 1415  Stroke Team Arrival Date: 04/22/20  Stroke Team Arrival Time: 1415  CT Interpretation Time: 1425  Decision to Treat Time for Alteplase: (decision made at OSH via telemedicine)  Decision to Treat Time for IR: 1425    NIH Scale:  1a. Level of Consciousness: 0-->Alert, keenly responsive  1b. LOC Questions: 2-->Answers neither question correctly  1c. LOC Commands: 1-->Performs one task correctly  2. Best Gaze: 1-->Partial gaze palsy, gaze is abnormal in one or both eyes, but forced deviation or total gaze paresis is not present  3. Visual: 2-->Complete hemianopia  4. Facial Palsy: 1-->Minor paralysis (flattened nasolabial fold, asymmetry on smiling)  5a. Motor Arm, Left: 0-->No drift, limb holds 90 (or 45) degrees for full 10 secs  5b. Motor Arm, Right: 4-->No movement  6a. Motor Leg, Left: 0-->No drift, leg holds 30 degree position for full 5 secs  6b. Motor Leg, Right: 4-->No movement  7. Limb Ataxia: 0-->Absent  8. Sensory: 0-->Normal, no sensory loss  9. Best Language: 2-->Severe aphasia, all communication is through fragmentary expression, great need for inference, questioning, and guessing by the listener. Range of information that can be exchanged is limited, listener carries burden of. . . (see row details)  10. Dysarthria: 2-->Severe dysarthria, patients speech is so slurred as to be unintelligible in the absence of or out of proportion to any dysphasia, or is mute/anarthric  11. Extinction and Inattention (formerly Neglect): 1-->Visual, tactile, auditory, spatial, or personal inattention or extinction to bilateral simultaneous stimulation in one of the sensory modalities  Total (NIH Stroke Scale): 20       Modified [place]    Jennifer Coma Scale:    ABCD2 Score:    [id number] Score:   HAS -BLED  Score:   ICH Score:   Hunt & Dahl Classification:      Hemorrhagic change of an Ischemic Stroke: Does this patient have an ischemic stroke with hemorrhagic changes? Yes, Grading Scale: HI Type 1 (HI-1) = small petechiae along the margins of the infarct. Is this a symptomatic change?  No - Hemorrhage is not clinically significant     Neurologic Chief Complaint: Right-sided weakness    Subjective:     Interval History: Patient is seen for follow-up neurological assessment and treatment recommendations: NAEON. Patient remains stable; monitoring BP. Pending SNF placement. Labs qOD.    HPI, Past Medical, Family, and Social History remains the same as documented in the initial encounter.     Review of Systems   Constitutional: Negative for fatigue and fever.   Eyes: Positive for visual disturbance. Negative for discharge.   Neurological: Positive for facial asymmetry, speech difficulty and weakness.   Psychiatric/Behavioral: Positive for confusion. Negative for decreased concentration.     Scheduled Meds:   aspirin  81 mg Oral Daily    atorvastatin  40 mg Oral Daily    clopidogreL  75 mg Oral Daily    heparin (porcine)  5,000 Units Subcutaneous Q8H    polyethylene glycol  17 g Oral Daily    senna-docusate 8.6-50 mg  1 tablet Oral BID     Continuous Infusions:  PRN Meds:acetaminophen, ondansetron    Objective:     Vital Signs (Most Recent):  Temp: 97.7 °F (36.5 °C) (04/27/20 0719)  Pulse: 67 (04/27/20 0721)  Resp: 16 (04/27/20 0719)  BP: 126/65 (04/27/20 0719)  SpO2: 97 % (04/27/20 0719)  BP Location: Left arm    Vital Signs Range (Last 24H):  Temp:  [97.5 °F (36.4 °C)-99 °F (37.2 °C)]   Pulse:  [64-78]   Resp:  [14-18]   BP: (116-148)/(59-79)   SpO2:  [93 %-97 %]   BP Location: Left arm    Physical Exam   Constitutional: She appears well-developed and well-nourished. No distress.   HENT:   Head: Normocephalic and atraumatic.   Eyes: Conjunctivae are normal. No scleral icterus.   Cardiovascular: Normal rate.    Pulmonary/Chest: Effort normal. No respiratory distress.   Musculoskeletal: She exhibits no edema or deformity.   Neurological: She is alert.   Skin: Skin is warm and dry.   Psychiatric: Cognition and memory are impaired. She is attentive.       Neurological Exam:   LOC: alert  Attention Span: Good   Language: Global aphasia, expressive > receptive  Articulation: Dysarthria  Orientation: Untestable due to severe aphasia   Visual Fields: Hemianopsia right  EOM (CN III, IV, VI): Gaze preference  left and but able to cross midline  Facial Movement (CN VII): Lower facial weakness on the Right  Motor: Arm left  Paresis: 4/5  Leg left  Paresis: 4/5  Arm right  Plegia 0/5  Leg right Plegia 0/5  Tone: Flaccid  RUE and RLE     Laboratory:  CMP:   Recent Labs   Lab 04/27/20  0357   CALCIUM 9.2   ALBUMIN 3.0*   PROT 7.3      K 3.6   CO2 20*      BUN 21   CREATININE 0.8   ALKPHOS 73   ALT 17   AST 32   BILITOT 0.4     BMP:   Recent Labs   Lab 04/27/20  0357      K 3.6      CO2 20*   BUN 21   CREATININE 0.8   CALCIUM 9.2     CBC:   Recent Labs   Lab 04/27/20  0357   WBC 7.26   RBC 3.75*   HGB 10.6*   HCT 33.9*      MCV 90   MCH 28.3   MCHC 31.3*     Lipid Panel:   Recent Labs   Lab 04/22/20  1720   CHOL 220*   LDLCALC 135.2   HDL 61   TRIG 119     Coagulation: No results for input(s): PT, INR, APTT in the last 168 hours.  Platelet Aggregation Study: No results for input(s): PLTAGG, PLTAGINTERP, PLTAGREGLACO, ADPPLTAGGREG in the last 168 hours.  Hgb A1C:   Recent Labs   Lab 04/22/20  1720   HGBA1C 5.6     TSH:   Recent Labs   Lab 04/22/20  1720   TSH 3.990       Diagnostic Results     Brain imaging:     MRI Brain w/o contrast 4/25/20  _  Prominent areas of acute ischemia/infarct involving the left cerebral hemisphere as detailed above with additional focal areas involving the left cerebellum and a focus of the right cerebellum.  Focal area of signal hypointensity of the left thalamus may relate to  an area of focal hemorrhagic conversion a large degree of hemorrhagic conversion is not appreciated.  Signal abnormality of the left internal carotid artery likely relates to diminished flow or potentially lack of flow, correlation with the recent angiographic procedure is recommended.     CT Head. Date: 04/22/20  Ill-defined region of decreased attenuation left insula anteriorly concerning for acute/recent infarction.  No significant mass effect or evidence for hemorrhagic conversion.       Vessel Imaging:    CTA Head/Neck 4/25/20  CT head: Evolving moderate to large regions of left MCA and PCA territory infarction as seen on prior MRI.  There is increased mass effect and edema particularly in left parietal and occipital components with localized mass effect without significant midline shift.  Intermediate density within the left temporal component concerning for component of petechial hemorrhage.  No evidence for extra-axial hemorrhage or hydrocephalus.  CTA head and neck: Continued occlusion left carotid artery and carotid bifurcation with reconstituted left distal cavernous ICA which may be retrograde or flow via the ophthalmic artery.  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  Taper configuration left distal P2 segment of the PCA concerning for stenosis and distal inclusion.  Atherosclerotic plaquing right carotid bifurcation proximal ICA with approximately 70 percent right proximal ICA stenosis by NASCET criteria.  Degenerative changes cervical spine with mild superior endplate C3 vertebral body deformity concerning for fracture overall age indeterminate and likely remote.    Cerebral Angiogram 4/22/20 -- Final read pending  Left ICA occlusion was treated using Avaitor Balloon angioplasty. Left terminal ICA occlusion was reperfused with TICI 2A score on there left MCA using aspiration catheter.     CTA OSH. Date:  04/22/20  L ICA occlusion        Cardiac imaging     TTE 4/23/20  · Normal left ventricular systolic function. The estimated ejection fraction is 70%.  · No wall motion abnormalities.  · Indeterminate left ventricular diastolic function.  · Normal right ventricular systolic function.  · The estimated PA systolic pressure is 32 mmHg.  · Normal central venous pressure (3 mmHg).  · Mild left atrial enlargement.      Kelley Sales PA-C  [facility]  Department of Vascular Neurology   [facility]

## 2020-04-27 NOTE — ASSESSMENT & PLAN NOTE
Teresa Vaughan is a 79 y.o. female with PMHx of HTN and HLD who presented to OSH with R-sided weakness and aphasia. She was seen via telemedicine and not eligible for tPA due to timeline. CTA obtained at OSH with L ICA occlusion and patient transferred to [facility] for possible IR intervention. CT head on arrival to [facility] with ischemic changes in L insula. Patient went to IR for angioplasty and aspiration thrombectomy with subsequent TICI 2A reperfusion. MRI Brain demonstrated acute infarcts involving L MCA, L PCA, and MAICO cerebellum. TTE showed mild L atrial enlargement; otherwise findings WNL. Of note, patient tested negative for COVID-19.    Repeat CTA Head/Neck 4/25 showed re-occlusion at the L carotid bifurcation as well as small degree of petechial hemorrhage. Stroke etiology suspected ESUS; plans for 30-day cardiac event monitoring following SNF discharge.      Antithrombotics for secondary stroke prevention: Antiplatelets: Aspirin: 81 mg daily, Plavix 75mg Daily  Statins for secondary stroke prevention and hyperlipidemia, if present:   Statins: Atorvastatin- 40 mg daily  Aggressive risk factor modification: HTN, HLD, R ICA stenosis  Rehab efforts: The patient has been evaluated by a stroke team provider and the therapy needs have been fully considered based off the presenting complaints and exam findings. The following therapy evaluations are needed: PT evaluate and treat, OT evaluate and treat, SLP evaluate and treat - Dispo SNF  Diagnostics ordered/pending: none  VTE prophylaxis: Heparin 5000 units SQ every 8 hours, place SCDs  BP parameters: Infarct, Carotid stenosis: SBP <160

## 2020-04-27 NOTE — PLAN OF CARE
Called [facility] and left message with my contact information for [facility] ([phone]) regarding placement. Awaiting call back.      Julie Haase RN  Case Management [phone]

## 2020-04-27 NOTE — PLAN OF CARE
SW received message from admissions at [facility] in [place] ([phone]) reporting having questions about this Pt.    WISAM AdamsW  Neurocritical Care   [facility]  [id number]

## 2020-04-27 NOTE — ASSESSMENT & PLAN NOTE
Upon review of brain imaging, moderate area of cytotoxic cerebral edema identified in the territory of the Left middle cerebral artery. There is associated mass effect.   We will continue to monitor the patients clinical exam for any worsening of symptoms which may indicate expansion of the stroke or the area of edema resulting in such clinical change.   Pattern is suggestive of an embolic etiology.

## 2020-04-27 NOTE — PT/OT/SLP PROGRESS
Speech Language Pathology Treatment    Patient Name:  Teresa Vaughan   MRN:  [id number]   345/345 A    Admitting Diagnosis: Stroke due to occlusion of left carotid artery    Recommendations:                 General Recommendations:  Dysphagia therapy, Speech/language therapy and Cognitive-linguistic therapy  Diet recommendations:  Mechanical soft, Liquid Diet Level: Nectar Thick   Aspiration Precautions: Strict aspiration precautions   -Alternate bites and sips  -1:1 assistance with meals  -Check for oral pockeintg/residue  -Continue to monitor for signs and symptoms of aspiration and discontinue oral feeding should you notice any of the following: watery eyes, reddened facial area, wet vocal quality, increased work of breathing, change in respiratory status, increased congestion, coughing, fever, etc  General Precautions: Standard, aphasia, aspiration, fall, nectar thick(mechanical soft)  Communication strategies:  go to room if call light pushed and     Subjective     Patient awake.     Objective:     Has the patient been evaluated by SLP for swallowing?   Yes  Keep patient NPO? No   Current Respiratory Status: room air      Pt seen at bedside, and SLP stood on right side. Patient with ability to attend to right side given min-mod cues. Pt followed 1 step simple commands with <50% acc. Patient did not state name or repeat single words. No voicing to command.  No response to y/n questions despite excess time and repetitions.  Mumbled unintelligible verbalizations x3. Patient assessed with bites of cheerios softened by milk, teaspoon bites of apple sauce, sips of nectar thick juice, and sips of thin water via cup and teaspoon. She presented with throat clearing following self regulated cup sips of water only. No overt s/s of aspiration with all further trials. Patient did present with slow oral transit time and excess chewing with cheerios, however, adequately cleared with liquid wash. White board  updated.     Assessment:     Teresa Vaughan is a 79 y.o. female with an SLP diagnosis of Aphasia, Dysphagia and Apraxia.      Goals:   Multidisciplinary Problems     SLP Goals        Problem: SLP Goal    Goal Priority Disciplines Outcome   SLP Goal     SLP Ongoing, Progressing   Description:  Speech Language Pathology Goals  Goals expected to be met by 4/30  1. Pt will tolerate puree diet with nectar thick liquids without overt s/s aspiration.   2. Pt will tolerate trials of thin liquids without overt s/s aspiration.   3. Pt will tolerate trials of solids without overt s/s aspiration.  4. Pt will answer simple y/n questions via any modality with 60% accy.  5. Pt will follow simple commands with 50% accy given max cues.   6. Pt will complete auto speech tasks with 30% accy given max cues.                            Plan:     · Patient to be seen:  4 x/week   · Plan of Care expires:  05/23/20  · Plan of Care reviewed with:  patient   · SLP Follow-Up:  Yes       Discharge recommendations:  nursing facility, skilled   Barriers to Discharge:  Level of Skilled Assistance Needed      Time Tracking:     SLP Treatment Date:   04/27/20  Speech Start Time:  0821  Speech Stop Time:  0839     Speech Total Time (min):  18 min    Billable Minutes: Speech Therapy Individual 10 Swallowing therapy 8    DARREN Cordero, CCC-SLP  04/27/2020

## 2020-04-27 NOTE — ASSESSMENT & PLAN NOTE
2/2 stroke - also in the setting of severe aphasia  SLP cleared patient for puree diet, nectar-thickened liquids  Continue aggressive SLP

## 2020-04-27 NOTE — SUBJECTIVE & OBJECTIVE
Interval History 4/27/2020:  Patient is seen for follow-up rehab evaluation and recommendations: participating with therapy. Stepped down to floor.     HPI, Past Medical, Family, and Social History remains the same as documented in the initial encounter.    Scheduled Medications:    aspirin  81 mg Oral Daily    atorvastatin  40 mg Oral Daily    clopidogreL  75 mg Oral Daily    heparin (porcine)  5,000 Units Subcutaneous Q8H    polyethylene glycol  17 g Oral Daily    senna-docusate 8.6-50 mg  1 tablet Oral BID       Diagnostic Results: Labs: Reviewed  ECG: Reviewed  CT: Reviewed    PRN Medications: acetaminophen, ondansetron    Review of Systems   Reason unable to perform ROS: Aphasia, nonverbal    Constitutional: Positive for activity change.   Neurological: Positive for weakness.     Objective:     Vital Signs (Most Recent):  Temp: 97.7 °F (36.5 °C) (04/27/20 0719)  Pulse: 67 (04/27/20 0721)  Resp: 16 (04/27/20 0719)  BP: 126/65 (04/27/20 0719)  SpO2: 97 % (04/27/20 0719)    Vital Signs (24h Range):  Temp:  [97.5 °F (36.4 °C)-99 °F (37.2 °C)] 97.7 °F (36.5 °C)  Pulse:  [64-78] 67  Resp:  [14-18] 16  SpO2:  [93 %-97 %] 97 %  BP: (116-148)/(59-79) 126/65     Physical Exam   Constitutional: She appears well-developed and well-nourished.   HENT:   Head: Normocephalic and atraumatic.   Eyes: Pupils are equal, round, and reactive to light. EOM are normal.   Neck: Normal range of motion. Neck supple.   Pulmonary/Chest: Effort normal. No respiratory distress.   Abdominal: Normal appearance.   Musculoskeletal: She exhibits no tenderness or deformity.   Neurological: She is alert.   - L gaze   - Aphasia  - not following commands  - spontaneously moving L side   Skin: Skin is warm and dry.   Psychiatric: She has a normal mood and affect. Her behavior is normal.   Nursing note and vitals reviewed.    NEUROLOGICAL EXAMINATION:     CRANIAL NERVES     CN III, IV, VI   Pupils are equal, round, and reactive to  light.  Extraocular motions are normal.

## 2020-04-27 NOTE — PT/OT/SLP PROGRESS
Occupational Therapy Treatment    Patient Name:  Teresa Vaughan   MRN:  [id number]  Admit Date: 4/22/2020  Admitting Diagnosis:  Stroke due to occlusion of left carotid artery   Length of Stay: 5 days  Recent Surgery: * No surgery found *      Recommendations:     Discharge Recommendations: nursing facility, skilled  Discharge Equipment Recommendations:  other (see comments)(TBD)  Barriers to discharge:  Inaccessible home environment, Decreased caregiver support    Plan:     Patient to be seen 3 x/week to address the above listed problems via self-care/home management, therapeutic activities, therapeutic exercises, neuromuscular re-education, cognitive retraining, sensory integration  · Plan of Care Expires: 05/23/20  · Plan of Care Reviewed with: patient    Assessment:     Teresa Vaughan is a 79 y.o. female admitted with a medical diagnosis of Stroke due to occlusion of left carotid artery.     Patient presented this date with continued global aphasia and RSW, inattentiveness to commands and attempts to orient and was unable to participate in ADLs assessed this date.   Pt continues to benefit from a collaborative PT/OT/SLP program to improve quality of life and focus on recovery of impairments.   [place] [employer]  Used during today's session    Problem List: weakness, impaired endurance, impaired sensation, impaired self care skills, impaired functional mobilty, gait instability, impaired balance, visual deficits, impaired cognition, decreased coordination, decreased upper extremity function, decreased lower extremity function, decreased safety awareness, decreased ROM, impaired coordination, impaired fine motor, impaired cardiopulmonary response to activity.    Rehab Prognosis: Fair; patient would benefit from acute skilled OT services to address these deficits and reach maximum level of function.        Subjective   Communicated with: RN prior to session.  Patient found HOB elevated with bed alarm, blood  pressure cuff, pulse ox (continuous), SCD, telemetry, peripheral IV, pressure relief boots, PureWick upon OT entry to room.    Patient: vocalizations, unable to comprehend,  used, unable to understand. Patient not responsive to questions, followed 0/5 commands, inattentive during session, eye contact occasionally to name, scanning to L, inattentive to R.     Pain/Comfort:  · Pain Rating 1: other (see comments)(aphasic, no indication of pain or discomfort)  · Pain Addressed 1: Reposition, Distraction, Cessation of Activity  · Pain Rating Post-Intervention 1: 0/10    Objective:   Patient found with: bed alarm, blood pressure cuff, pulse ox (continuous), SCD, telemetry, peripheral IV, pressure relief boots, PureWick   General Precautions: Standard, Cardiac aphasia, aspiration, fall, nectar thick   Orthopedic Precautions:N/A   Braces: N/A   Oxygen Device: Room Air  Vitals: /71 (BP Location: Left arm, Patient Position: Sitting)   Pulse 73   Temp 98.5 °F (36.9 °C) (Oral)   Resp 17   Ht 5' (1.524 m)   Wt 51 kg (112 lb 7 oz)   SpO2 (!) 92%   Breastfeeding? No   BMI 21.96 kg/m²     Outcome Measures:  [facility] 6 Click ADL: 6    Cognition:   · Inattentive, aphasic, occasionally responsive to name, inattentive to R side  · Command following: Inattentive  · Fluency: expressive aphasia, receptive aphasia and dysarthria    Occupational Performance:  Bed Mobility:    · Patient completed Rolling/Turning to Left with  total assistance  · Patient completed Rolling/Turning to Right with total assistance  · Scooting to HOB in supine: total assistance and 2 persons  · Patient completed Supine to Sit with total assistance on L side of bed  · Patient completed Sit to Supine with total assistance and 2 persons on L side of bed  · Scooting anteriorly to EOB to have both feet planted on floor: total assistance    Functional Mobility/Transfers:  -Squat Pivot along EOB to get closer to HOB: Total A, patient unable  to assist, Rside flaccidity.   - Static Sitting EOB: Total A, pushing with L to R  - Functional Mobility: Further mobility deferred 2* impaired truncal control 2* aphasia and lack of command follow and impaired arousal as patient fatigued sitting EOB with staff assistance.     Activities of Daily Living:  · Grooming: total assistance washing face, patient inattentive, [place] with RUE, RUE flaccid, Hand over hand donning lotion to hands and face.   · Lower Body Dressing: total assistance donning socks  · Toileting: total assistance changing brief at supine level, patient unable to assist in bed moblity 2* global aphasia    AMPA 6 Click ADL:  AMPA Total Score: 6    Treatment & Education:  -Pt education on OT role and POC   -Importance of OOB activity with staff assistance  -Safety during functional transfer and mobility  -White board updated  -Multiple self-care tasks and functional mobility completed -- assistance level noted above  -All questions and concerns answered within OT scope of practice.       Patient left HOB elevated with all lines intact, call button in reach, bed alarm on and RN notified    GOALS:   Multidisciplinary Problems     Occupational Therapy Goals        Problem: Occupational Therapy Goal    Goal Priority Disciplines Outcome Interventions   Occupational Therapy Goal     OT, PT/OT Ongoing, Progressing    Description:  Goals set on 4/23 with expiration date 5/8:  Patient will increase functional independence with ADLs by performing:    Supine <> Sit with Min Assistance.  Feeding with Min  Assistance.  Grooming while standing at sink with Min  Assistance.  UB Dressing with Min  Assistance.  LB Dressing with Min Assistance.  Step transfer with Min Assistance with DME as needed.  Pt will demonstrate understanding of education provided regarding energy conservation and task modification through teach-back method.   Patient will increase functional independence with ADLs by performing:  Patient will  demonstrate assistance as needed with HEP for R UE weight bearing.      Patient's family / caregiver will demonstrate assistance as needed and safety with assisting patient with self-care skills and functional mobility.      Patient's family / caregiver will demonstrate assistance as needed with HEP, A/AAROM and changes in bed positioning.                               Time Tracking:     OT Date of Treatment: 04/27/20  OT Start Time: 1034  OT Stop Time: 1113  OT Total Time (min): 39 min  Additional staff present: PT      Billable Minutes:Self Care/Home Management 30  Sensory Integration 9      CHANDRA Siddiqi  4/27/2020

## 2020-04-27 NOTE — ASSESSMENT & PLAN NOTE
Stroke risk factor  SBP < 160 in the setting of MAICO ICA atherosclerosis  Home meds unknown though pt's BP at goal/borderline low over last 24 hrs.  Will continue monitoring

## 2020-04-28 LAB
ANTI SM ANTIBODY: 0.12 RATIO (ref 0–0.99)
ANTI SM/RNP ANTIBODY: 0.08 RATIO (ref 0–0.99)
ANTI-SM INTERPRETATION: NEGATIVE
ANTI-SM/RNP INTERPRETATION: NEGATIVE
ANTI-SSA ANTIBODY: 0.07 RATIO (ref 0–0.99)
ANTI-SSA INTERPRETATION: NEGATIVE
ANTI-SSB ANTIBODY: 0.11 RATIO (ref 0–0.99)
ANTI-SSB INTERPRETATION: NEGATIVE
DSDNA AB SER-ACNC: NORMAL [IU]/ML
POCT GLUCOSE: 111 MG/DL (ref 70–110)
POCT GLUCOSE: 116 MG/DL (ref 70–110)

## 2020-04-28 PROCEDURE — 20600001 HC STEP DOWN PRIVATE ROOM

## 2020-04-28 PROCEDURE — 99232 PR SUBSEQUENT HOSPITAL CARE,LEVL II: ICD-10-PCS | Mod: ,,, | Performed by: NURSE PRACTITIONER

## 2020-04-28 PROCEDURE — 99233 PR SUBSEQUENT HOSPITAL CARE,LEVL III: ICD-10-PCS | Mod: ,,, | Performed by: PSYCHIATRY & NEUROLOGY

## 2020-04-28 PROCEDURE — 97535 SELF CARE MNGMENT TRAINING: CPT

## 2020-04-28 PROCEDURE — 92507 TX SP LANG VOICE COMM INDIV: CPT

## 2020-04-28 PROCEDURE — 63600175 PHARM REV CODE 636 W HCPCS: Performed by: INTERNAL MEDICINE

## 2020-04-28 PROCEDURE — 99232 SBSQ HOSP IP/OBS MODERATE 35: CPT | Mod: ,,, | Performed by: NURSE PRACTITIONER

## 2020-04-28 PROCEDURE — 97112 NEUROMUSCULAR REEDUCATION: CPT

## 2020-04-28 PROCEDURE — 25000003 PHARM REV CODE 250: Performed by: INTERNAL MEDICINE

## 2020-04-28 PROCEDURE — 92526 ORAL FUNCTION THERAPY: CPT

## 2020-04-28 PROCEDURE — 99233 SBSQ HOSP IP/OBS HIGH 50: CPT | Mod: ,,, | Performed by: PSYCHIATRY & NEUROLOGY

## 2020-04-28 RX ORDER — ATORVASTATIN CALCIUM 40 MG/1
40 TABLET, FILM COATED ORAL DAILY
Qty: 90 TABLET | Refills: 3
Start: 2020-04-29 | End: 2021-04-29

## 2020-04-28 RX ORDER — CLOPIDOGREL BISULFATE 75 MG/1
75 TABLET ORAL DAILY
Qty: 30 TABLET | Refills: 11
Start: 2020-04-29 | End: 2021-04-29

## 2020-04-28 RX ORDER — AMOXICILLIN 250 MG
1 CAPSULE ORAL 2 TIMES DAILY
Start: 2020-04-28

## 2020-04-28 RX ORDER — POLYETHYLENE GLYCOL 3350 17 G/17G
17 POWDER, FOR SOLUTION ORAL DAILY
Refills: 0
Start: 2020-04-29

## 2020-04-28 RX ORDER — ASPIRIN 81 MG/1
81 TABLET ORAL DAILY
Refills: 0
Start: 2020-04-29 | End: 2021-04-29

## 2020-04-28 RX ADMIN — HEPARIN SODIUM 5000 UNITS: 5000 INJECTION INTRAVENOUS; SUBCUTANEOUS at 05:04

## 2020-04-28 RX ADMIN — ATORVASTATIN CALCIUM 40 MG: 20 TABLET, FILM COATED ORAL at 09:04

## 2020-04-28 RX ADMIN — HEPARIN SODIUM 5000 UNITS: 5000 INJECTION INTRAVENOUS; SUBCUTANEOUS at 09:04

## 2020-04-28 RX ADMIN — HEPARIN SODIUM 5000 UNITS: 5000 INJECTION INTRAVENOUS; SUBCUTANEOUS at 01:04

## 2020-04-28 RX ADMIN — STANDARDIZED SENNA CONCENTRATE AND DOCUSATE SODIUM 1 TABLET: 8.6; 5 TABLET ORAL at 09:04

## 2020-04-28 RX ADMIN — CLOPIDOGREL BISULFATE 75 MG: 75 TABLET ORAL at 09:04

## 2020-04-28 RX ADMIN — ASPIRIN 81 MG: 81 TABLET, COATED ORAL at 09:04

## 2020-04-28 NOTE — PLAN OF CARE
DANA received a call from Ana with [facility] ([phone]) and she reports they are interested in accepting pt; however, need additional documentation. DANA sent over updated progress notes. Ana reports she received the information and will review and provide DANA with an update by this afternoon.    Tamy Cerna LMSW  [facility]  Ext. [phone]

## 2020-04-28 NOTE — ASSESSMENT & PLAN NOTE
2/2 stroke - also in the setting of severe aphasia  Diet had been upgraded to mechanical soft on 4/27 however per SLP 4/28 AM, pt not tolerating well; changed diet back to puree with nectar-thick liquids.   Continue aggressive SLP
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
## 2020-04-28 NOTE — PLAN OF CARE
Care plan reviewed  with patient, no evidence of learning noted. Oriented to self only, when her name is said, pt looks in direction of whom said her name. Global aphasia noted, incomprehensible speech noted. FACEs pain scale 0/10 at rest and activity. External female catheter in place. PT/OT following. NADN. Neurochecks unchanged. R-sided hemiparesis noted. Turned/ repositioned as needed. Waffle mattress on bed. NSR on telemetry. PLAN= SNF. Family members updated on care plan via telephone. SW following. Diet= Dysphagia pureed, NTL- tolerating well. Total feed required. Unable to feed self effectively.

## 2020-04-28 NOTE — PT/OT/SLP PROGRESS
Speech Language Pathology Treatment    Patient Name:  Teresa Vaughan   MRN:  [id number]  Admitting Diagnosis: Stroke due to occlusion of left carotid artery    Recommendations:                 General Recommendations:  Dysphagia therapy, Speech/language therapy and Cognitive-linguistic therapy  Diet recommendations:  Puree, Liquid Diet Level: Nectar Thick   Aspiration Precautions: Strict aspiration precautions   -Alternate bites and sips  -1:1 assistance with meals  -Check for oral pockeintg/residue  -Continue to monitor for signs and symptoms of aspiration and discontinue oral feeding should you notice any of the following: watery eyes, reddened facial area, wet vocal quality, increased work of breathing, change in respiratory status, increased congestion, coughing, fever, etc   General Precautions: Standard, aspiration, aphasia, fall  Communication strategies:  provide increased time to answer, go to room if call light pushed and     Subjective     Pt with strings of words though per , not true words, inappropriate.    #[id number] via language line utilized during session     Pain/Comfort:  · Pain Rating 1: 0/10(NAD)  · Pain Rating Post-Intervention 1: 0/10    Objective:     Has the patient been evaluated by SLP for swallowing?   Yes  Keep patient NPO? No   Current Respiratory Status: room air      Pt seen bedside, alert and cooperative.  She followed simple commands with 40% accy given min cues.  No response to y.n questions, vowel repetition or auto speech tasks.  Pt verbalizing t/o session though appeared to be jargon per .  She visually attended to SLP on R indptly given increased time when named called.  Pt's breakfast tray at bedside.  SLP assisted with feeding mechanical soft solids and nectar thick liquids.  Mastication times were prolonged with pocketing bilaterally with cues to attend to mastication task.  Finger sweep utilized to clear stasis  from R buccal cavity.  Liquid wash required to clear stasis.  Intake was very slow and appeared effortful which is concerning for adequate nutrition.  Pt readily drinks nectar thick liquids without difficulty.  Pt's PCT reports difficulty with solids intake last night as well though reports good intake of puree.  SLP discussed downgrade back to puree for maximum intake with pt's nurse and stroke team.  Pt did no respond to education re: ongoing SLP POC, diet downgrade, swallow precs and POC.  Education to be ongoing.      Assessment:     Teresa Vaughan is a 79 y.o. female with an SLP diagnosis of Aphasia, Dysphagia, Cognitive-Linguistic Impairment and Visio-Spatial Impairment.     Goals:   Multidisciplinary Problems     SLP Goals        Problem: SLP Goal    Goal Priority Disciplines Outcome   SLP Goal     SLP Ongoing, Progressing   Description:  Speech Language Pathology Goals  Goals expected to be met by 4/30  1. Pt will tolerate puree diet with nectar thick liquids without overt s/s aspiration.   2. Pt will tolerate trials of thin liquids without overt s/s aspiration.   3. Pt will tolerate trials of solids without overt s/s aspiration.  4. Pt will answer simple y/n questions via any modality with 60% accy.  5. Pt will follow simple commands with 50% accy given max cues.   6. Pt will complete auto speech tasks with 30% accy given max cues.                            Plan:     · Patient to be seen:  4 x/week   · Plan of Care expires:  05/23/20  · Plan of Care reviewed with:  patient   · SLP Follow-Up:  Yes       Discharge recommendations:  nursing facility, skilled   Barriers to Discharge:  Level of Skilled Assistance Needed      Time Tracking:     SLP Treatment Date:   04/28/20  Speech Start Time:  0726  Speech Stop Time:  0753     Speech Total Time (min):  27 min    Billable Minutes: Speech Therapy Individual 10 and Treatment Swallowing Dysfunction 17    DARREN Box, CCC-SLP  04/28/2020

## 2020-04-28 NOTE — PLAN OF CARE
Pt free of falls/injuries. Pt appears free of pain or respiratory distress. VSS. NSR on tele. Q6H BG monitoring continued, no SSI administered. Neuro checks q4h maintained. VTE med administered. Plan is to discharge to SNF. Call light in reach. Bed alarm activated. Bed  @ lowest position. Will continue to monitor.

## 2020-04-28 NOTE — PT/OT/SLP PROGRESS
Occupational Therapy   Treatment    Name: Teresa Vaughan  MRN: [id number]  Admitting Diagnosis:  Stroke due to occlusion of left carotid artery       Recommendations:     Discharge Recommendations: nursing facility, skilled  Discharge Equipment Recommendations:  (TBD at next level of care)  Barriers to discharge:  Decreased caregiver support, Inaccessible home environment    Assessment:     Teresa Vaughan is a 79 y.o. female with a medical diagnosis of Stroke due to occlusion of left carotid artery.  She presents with some improvement of sitting balance and trunk control w/ positioning assistance.  She was also scanning more frequently to the right side.  OT attempted some proprioceptive orientation exercises that decreased pushing while seated w/ some pulling from left side, but she was eventually able to sit with both hands in her lap for >3 mins w/ no pushing, pulling or assistance from BUE. Performance deficits affecting function are weakness, impaired endurance, impaired self care skills, impaired functional mobilty, impaired balance, decreased upper extremity function, decreased lower extremity function, impaired cognition, visual deficits, gait instability, impaired fine motor, impaired cardiopulmonary response to activity, abnormal tone, decreased ROM.     Rehab Prognosis:  Fair; patient would benefit from acute skilled OT services to address these deficits and reach maximum level of function.       Plan:     Patient to be seen 3 x/week to address the above listed problems via self-care/home management, therapeutic activities, therapeutic exercises, neuromuscular re-education, cognitive retraining, sensory integration  · Plan of Care Expires: 05/23/20  · Plan of Care Reviewed with: patient    Subjective   Pt was responsive when [place] was used, but her words were unintelligible and her voice was very soft and breathy.   Pain/Comfort:  · Pain Rating 1: other (see comments)(NAD)  · Pain Rating Post-Intervention  1: other (see comments)(no change)    Objective:     Communicated with: RN prior to session.  Patient found HOB elevated with bed alarm, pressure relief boots, telemetry, PureWick upon OT entry to room.    General Precautions: Standard, aspiration, aphasia, fall   Orthopedic Precautions:N/A   Braces: N/A     Occupational Performance:     Bed Mobility:    · Patient completed Rolling/Turning to Left with  total assistance and 1  persons  · Patient completed Rolling/Turning to Right with total assistance  · Patient completed Scooting/Bridging with total assistance  · Patient completed Supine to Sit with total assistance and 1 persons  · Patient completed Sit to Supine with total assistance and 1 persons     Functional Mobility/Transfers:  · Patient completed Sit <> Stand Transfer with moderate assistance and of 1 persons  with  hand-held assist and grab bars(s)  x2 attempts   · Functional Mobility: Pt was able to stand at bedside for 4 mins total [id number] wb thru RUE and w/ Max A, and HHAx1.    Activities of Daily Living:  · Bathing: maximal assistance and of 1 persons using [place] w/ left hand to wash face.       [facility] 6 Click ADL: 6    Treatment & Education:  -Pt edu on OT role/POC  -Importance of OOB activity with staff assistance  -Safety during functional t/f and mobility  - White board updated  - Multiple self care tasks/functional mobility completed-- assistance level noted above  - All questions/concerns answered within OT scope of practice      Patient left HOB elevated with all lines intact, call button in reach, bed alarm on and RN notifiedEducation:      GOALS:   Multidisciplinary Problems     Occupational Therapy Goals        Problem: Occupational Therapy Goal    Goal Priority Disciplines Outcome Interventions   Occupational Therapy Goal     OT, PT/OT Ongoing, Progressing    Description:  Goals set on 4/23 with expiration date 5/8/2020:  Patient will increase functional independence with ADLs by  performing:    Supine <> Sit with Min Assistance.  Feeding with Min  Assistance.  Grooming while standing at sink with Min  Assistance.  UB Dressing with Min  Assistance.  LB Dressing with Min Assistance.  Step transfer with Min Assistance with DME as needed.  Pt will demonstrate understanding of education provided regarding energy conservation and task modification through teach-back method.   Patient will increase functional independence with ADLs by performing:  Patient will demonstrate assistance as needed with HEP for R UE weight bearing.      Patient's family / caregiver will demonstrate assistance as needed and safety with assisting patient with self-care skills and functional mobility.      Patient's family / caregiver will demonstrate assistance as needed with HEP, A/AAROM and changes in bed positioning.                                Time Tracking:     OT Date of Treatment: 04/28/20  OT Start Time: 1414  OT Stop Time: 1439  OT Total Time (min): 25 min    Billable Minutes:Self Care/Home Management 10 mins  Neuromuscular Re-education 15 mins    Rocco Devine, OT  4/28/2020

## 2020-04-28 NOTE — SUBJECTIVE & OBJECTIVE
Interval History 4/28/2020:  Patient is seen for follow-up rehab evaluation and recommendations: Alert and participating with therapy.     HPI, Past Medical, Family, and Social History remains the same as documented in the initial encounter.    Scheduled Medications:    aspirin  81 mg Oral Daily    atorvastatin  40 mg Oral Daily    clopidogreL  75 mg Oral Daily    heparin (porcine)  5,000 Units Subcutaneous Q8H    polyethylene glycol  17 g Oral Daily    senna-docusate 8.6-50 mg  1 tablet Oral BID       Diagnostic Results:   Labs; reviewed     PRN Medications: acetaminophen, ondansetron    Review of Systems   Reason unable to perform ROS: Aphasia, spoke a couple words.   Constitutional: Positive for activity change.   Neurological: Positive for speech difficulty and weakness.     Objective:     Vital Signs (Most Recent):  Temp: 98.1 °F (36.7 °C) (04/28/20 1106)  Pulse: 69 (04/28/20 1140)  Resp: 18 (04/28/20 1106)  BP: (!) 141/64 (04/28/20 1106)  SpO2: (!) 94 % (04/28/20 1106)    Vital Signs (24h Range):  Temp:  [97.7 °F (36.5 °C)-99 °F (37.2 °C)] 98.1 °F (36.7 °C)  Pulse:  [58-76] 69  Resp:  [16-18] 18  SpO2:  [92 %-98 %] 94 %  BP: (126-159)/(60-73) 141/64     Physical Exam   Constitutional: She appears well-developed and well-nourished.   HENT:   Head: Normocephalic and atraumatic.   Eyes: Pupils are equal, round, and reactive to light. EOM are normal.   Neck: Normal range of motion. Neck supple.   Pulmonary/Chest: Effort normal. No respiratory distress.   Abdominal: Normal appearance.   Musculoskeletal: She exhibits no tenderness or deformity.   Neurological: She is alert.   - L gaze   - Aphasia  - spoke a few words  - made eye contact and picked up L arm as a simple commands     Skin: Skin is warm and dry.   Psychiatric: She has a normal mood and affect. Her behavior is normal.   Nursing note and vitals reviewed.    NEUROLOGICAL EXAMINATION:     CRANIAL NERVES     CN III, IV, VI   Pupils are equal, round,  and reactive to light.  Extraocular motions are normal.

## 2020-04-28 NOTE — SUBJECTIVE & OBJECTIVE
Neurologic Chief Complaint: Right-sided weakness    Subjective:     Interval History: Patient is seen for follow-up neurological assessment and treatment recommendations: NAEON. Diet had been upgraded yesterday however per SLP this AM, pt not tolerating well; changed diet back to puree with nectar-thick liquids. Neuro exam remains stable. CM/SW continuing efforts toward placement.     HPI, Past Medical, Family, and Social History remains the same as documented in the initial encounter.     Review of Systems   Constitutional: Negative for fatigue and fever.   Eyes: Positive for visual disturbance. Negative for discharge.   Neurological: Positive for facial asymmetry, speech difficulty and weakness.   Psychiatric/Behavioral: Positive for confusion. Negative for decreased concentration.     Scheduled Meds:   aspirin  81 mg Oral Daily    atorvastatin  40 mg Oral Daily    clopidogreL  75 mg Oral Daily    heparin (porcine)  5,000 Units Subcutaneous Q8H    polyethylene glycol  17 g Oral Daily    senna-docusate 8.6-50 mg  1 tablet Oral BID     Continuous Infusions:  PRN Meds:acetaminophen, ondansetron    Objective:     Vital Signs (Most Recent):  Temp: 97.7 °F (36.5 °C) (04/28/20 0752)  Pulse: 70 (04/28/20 0752)  Resp: 16 (04/28/20 0752)  BP: (!) 159/73 (04/28/20 0752)  SpO2: 98 % (04/28/20 0752)  BP Location: Left arm    Vital Signs Range (Last 24H):  Temp:  [97.7 °F (36.5 °C)-99 °F (37.2 °C)]   Pulse:  [58-76]   Resp:  [16-18]   BP: (116-159)/(60-73)   SpO2:  [92 %-98 %]   BP Location: Left arm    Physical Exam   Constitutional: She appears well-developed and well-nourished. No distress.   HENT:   Head: Normocephalic and atraumatic.   Eyes: Conjunctivae are normal. No scleral icterus.   Cardiovascular: Normal rate.   Pulmonary/Chest: Effort normal. No respiratory distress.   Musculoskeletal: She exhibits no edema or deformity.   Neurological: She is alert.   Skin: Skin is warm and dry.   Psychiatric: Cognition and  memory are impaired. She is attentive.       Neurological Exam:   LOC: alert  Attention Span: Good   Language: Global aphasia  Articulation: Dysarthria  Orientation: Untestable due to severe aphasia   Visual Fields: Hemianopsia right  EOM (CN III, IV, VI): Gaze preference  left and but able to cross midline  Facial Movement (CN VII): Lower facial weakness on the Right  Motor: Arm left  Paresis: 4/5  Leg left  Paresis: 4/5  Arm right  Plegia 0/5  Leg right Plegia 0/5  Tone: Flaccid  RUE and RLE     Laboratory:  CMP:   No results for input(s): GLUCOSE, CALCIUM, ALBUMIN, PROT, NA, K, CO2, CL, BUN, CREATININE, ALKPHOS, ALT, AST, BILITOT in the last 24 hours.  BMP:   Recent Labs   Lab 04/27/20  0357      K 3.6      CO2 20*   BUN 21   CREATININE 0.8   CALCIUM 9.2     CBC:   Recent Labs   Lab 04/27/20  0357   WBC 7.26   RBC 3.75*   HGB 10.6*   HCT 33.9*      MCV 90   MCH 28.3   MCHC 31.3*     Lipid Panel:   Recent Labs   Lab 04/22/20  1720   CHOL 220*   LDLCALC 135.2   HDL 61   TRIG 119     Coagulation: No results for input(s): PT, INR, APTT in the last 168 hours.  Platelet Aggregation Study: No results for input(s): PLTAGG, PLTAGINTERP, PLTAGREGLACO, ADPPLTAGGREG in the last 168 hours.  Hgb A1C:   Recent Labs   Lab 04/22/20  1720   HGBA1C 5.6     TSH:   Recent Labs   Lab 04/22/20  1720   TSH 3.990       Diagnostic Results     Brain imaging:     MRI Brain w/o contrast 4/25/20    Prominent areas of acute ischemia/infarct involving the left cerebral hemisphere as detailed above with additional focal areas involving the left cerebellum and a focus of the right cerebellum.  Focal area of signal hypointensity of the left thalamus may relate to an area of focal hemorrhagic conversion a large degree of hemorrhagic conversion is not appreciated.  Signal abnormality of the left internal carotid artery likely relates to diminished flow or potentially lack of flow, correlation with the recent angiographic procedure  is recommended.     CT Head. Date: 04/22/20  Ill-defined region of decreased attenuation left insula anteriorly concerning for acute/recent infarction.  No significant mass effect or evidence for hemorrhagic conversion.       Vessel Imaging:    CTA Head/Neck 4/25/20  CT head: Evolving moderate to large regions of left MCA and PCA territory infarction as seen on prior MRI.  There is increased mass effect and edema particularly in left parietal and occipital components with localized mass effect without significant midline shift.  Intermediate density within the left temporal component concerning for component of petechial hemorrhage.  No evidence for extra-axial hemorrhage or hydrocephalus.  CTA head and neck: Continued occlusion left carotid artery and carotid bifurcation with reconstituted left distal cavernous ICA which may be retrograde or flow via the ophthalmic artery.  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  Taper configuration left distal P2 segment of the PCA concerning for stenosis and distal inclusion.  Atherosclerotic plaquing right carotid bifurcation proximal ICA with approximately 70 percent right proximal ICA stenosis by NASCET criteria.  Degenerative changes cervical spine with mild superior endplate C3 vertebral body deformity concerning for fracture overall age indeterminate and likely remote.    Cerebral Angiogram 4/22/20  1.  Occluded left internal carotid artery at the origin treated by balloon angioplasty to less than 30% stenosis.  No embolization protection device used because distal ICA was occluded.  2.  Aspiration thrombectomy performed at left ICA terminus with TICI 2A reperfusion.    CTA OSH. Date: 04/22/20  L ICA occlusion        Cardiac imaging     TTE 4/23/20  · Normal left ventricular systolic function. The estimated ejection fraction is 70%.  · No wall motion abnormalities.  · Indeterminate  left ventricular diastolic function.  · Normal right ventricular systolic function.  · The estimated PA systolic pressure is 32 mmHg.  · Normal central venous pressure (3 mmHg).  · Mild left atrial enlargement.

## 2020-04-28 NOTE — PROGRESS NOTES
[facility]  Vascular Neurology  Comprehensive Stroke Center  Progress Note    Assessment/Plan:     * Stroke due to occlusion of left carotid artery  Teresa Vaughan is a 79 y.o. female with PMHx of HTN and HLD who presented to OSH with R-sided weakness and aphasia. She was seen via telemedicine and not eligible for tPA due to timeline. CTA obtained at OSH with L ICA occlusion and patient transferred to [facility] for possible IR intervention. CT head on arrival to C with ischemic changes in L insula. Patient went to IR for angioplasty and aspiration thrombectomy with subsequent TICI 2A reperfusion. MRI Brain demonstrated acute infarcts involving L MCA, L PCA, and MAICO cerebellum. TTE showed mild L atrial enlargement; otherwise findings WNL. Of note, patient tested negative for COVID-19.    Repeat CTA Head/Neck 4/25 showed re-occlusion at the L carotid bifurcation as well as small degree of petechial hemorrhage. Stroke etiology suspected ESUS; plans for 30-day cardiac event monitoring following SNF discharge.    Neuro exam remains stable. CM/SW continuing efforts toward placement.       Antithrombotics for secondary stroke prevention: Antiplatelets: Aspirin: 81 mg daily, Plavix 75mg Daily  Statins for secondary stroke prevention and hyperlipidemia, if present:   Statins: Atorvastatin- 40 mg daily  Aggressive risk factor modification: HTN, HLD, R ICA stenosis  Rehab efforts: The patient has been evaluated by a stroke team provider and the therapy needs have been fully considered based off the presenting complaints and exam findings. The following therapy evaluations are needed: PT evaluate and treat, OT evaluate and treat, SLP evaluate and treat - Dispo SNF (pt was living alone previously)  Diagnostics ordered/pending: none  VTE prophylaxis: Heparin 5000 units SQ every 8 hours, place SCDs  BP parameters: Infarct, Carotid stenosis: SBP <160    Stenosis of right internal carotid artery  Stroke risk  factor  CTA 4/25 demonstrated L ICA and bifurcation, as well as R ICA atherosclerotic plaquing with approx 70% stenosis  R ICA stenosis currently appears asymptomatic.  Plans for Ambulatory referral to Vascular Surgery at discharge, though pt would need to recover more before determining surgical candidacy.   DAPT, statin    Dysphagia  2/2 stroke - also in the setting of severe aphasia  Diet had been upgraded to mechanical soft on 4/27 however per SLP 4/28 AM, pt not tolerating well; changed diet back to puree with nectar-thick liquids.   Continue aggressive SLP    Essential hypertension  Stroke risk factor  SBP < 160 in the setting of MAICO ICA atherosclerosis  Home meds unknown though pt's BP at goal over last 24 hrs without use of any anti-hypertensives.  Will continue monitoring    Cytotoxic cerebral edema  Upon review of brain imaging, moderate area of cytotoxic cerebral edema identified in the territory of the Left middle cerebral artery. There is associated mass effect.   We will continue to monitor the patients clinical exam for any worsening of symptoms which may indicate expansion of the stroke or the area of edema resulting in such clinical change.   Pattern is suggestive of an embolic etiology.    Mixed hyperlipidemia  Stroke risk factor    Home medications unknown  Atorvastatin 40 mg daily         Teresa Vaughan is a 79 y.o. female with PMHx of HTN and HLD who presented to [facility] with aphasia and RSW. Patient went to ED after being found outside wandering around by her neighbors. She was seen via telemedicine and was not a candidate for tPA . CTA at OSH revealed L ICA occlusion. Patient transferred to [facility] for possible IR intervention. Angioplasty and Aspiration thrombectomy completed at that time. Patient primarily English speaking and aphasic.     4/25: Patient stepped down to stroke primary service. CTA head and neck complete- L ICA re occluded but reconstitutes distally.  Neuro exam unchanged.   4/26: Neuro exam stable. Stroke etiology likely ESUS. Patient to discharge with 30 day event monitor.  4/27: Patient remains stable; monitoring BP. Pending SNF placement. Labs qOD.  4/28: Diet had been upgraded yesterday however per SLP this AM, pt not tolerating well; changed diet back to puree with nectar-thick liquids. Neuro exam remains stable. CM/SW continuing efforts toward placement.     STROKE DOCUMENTATION   Acute Stroke Times   Last Known Normal Date: 04/21/20  Last Known Normal Time: 1800  Symptom Onset Date: (unknown)  Symptom Onset Time: (unknown)  Stroke Team Called Date: 04/22/20  Stroke Team Called Time: 1415  Stroke Team Arrival Date: 04/22/20  Stroke Team Arrival Time: 1415  CT Interpretation Time: 1425  Decision to Treat Time for Alteplase: (decision made at OSH via telemedicine)  Decision to Treat Time for IR: 1425    NIH Scale:  1a. Level of Consciousness: 0-->Alert, keenly responsive  1b. LOC Questions: 2-->Answers neither question correctly  1c. LOC Commands: 1-->Performs one task correctly  2. Best Gaze: 1-->Partial gaze palsy, gaze is abnormal in one or both eyes, but forced deviation or total gaze paresis is not present  3. Visual: 2-->Complete hemianopia  4. Facial Palsy: 2-->Partial paralysis (total or near-total paralysis of lower face)  5a. Motor Arm, Left: 0-->No drift, limb holds 90 (or 45) degrees for full 10 secs  5b. Motor Arm, Right: 4-->No movement  6a. Motor Leg, Left: 0-->No drift, leg holds 30 degree position for full 5 secs  6b. Motor Leg, Right: 4-->No movement  7. Limb Ataxia: 0-->Absent  8. Sensory: 0-->Normal, no sensory loss  9. Best Language: 2-->Severe aphasia, all communication is through fragmentary expression, great need for inference, questioning, and guessing by the listener. Range of information that can be exchanged is limited, listener carries burden of. . . (see row details)  10. Dysarthria: 2-->Severe dysarthria, patients speech is so  slurred as to be unintelligible in the absence of or out of proportion to any dysphasia, or is mute/anarthric  11. Extinction and Inattention (formerly Neglect): 1-->Visual, tactile, auditory, spatial, or personal inattention or extinction to bilateral simultaneous stimulation in one of the sensory modalities  Total (NIH Stroke Scale): 21       Modified Delmis    [place] Coma Scale:    ABCD2 Score:    [id number] Score:   HAS -BLED Score:   ICH Score:   Hunt & Dahl Classification:      Hemorrhagic change of an Ischemic Stroke: Does this patient have an ischemic stroke with hemorrhagic changes? Yes, Grading Scale: HI Type 1 (HI-1) = small petechiae along the margins of the infarct. Is this a symptomatic change?  No - Hemorrhage is not clinically significant     Neurologic Chief Complaint: Right-sided weakness    Subjective:     Interval History: Patient is seen for follow-up neurological assessment and treatment recommendations: NAEON. Diet had been upgraded yesterday however per SLP this AM, pt not tolerating well; changed diet back to puree with nectar-thick liquids. Neuro exam remains stable. CM/SW continuing efforts toward placement.     HPI, Past Medical, Family, and Social History remains the same as documented in the initial encounter.     Review of Systems   Constitutional: Negative for fatigue and fever.   Eyes: Positive for visual disturbance. Negative for discharge.   Neurological: Positive for facial asymmetry, speech difficulty and weakness.   Psychiatric/Behavioral: Positive for confusion. Negative for decreased concentration.     Scheduled Meds:   aspirin  81 mg Oral Daily    atorvastatin  40 mg Oral Daily    clopidogreL  75 mg Oral Daily    heparin (porcine)  5,000 Units Subcutaneous Q8H    polyethylene glycol  17 g Oral Daily    senna-docusate 8.6-50 mg  1 tablet Oral BID     Continuous Infusions:  PRN Meds:acetaminophen, ondansetron    Objective:     Vital Signs (Most Recent):  Temp: 97.7 °F  (36.5 °C) (04/28/20 0752)  Pulse: 70 (04/28/20 0752)  Resp: 16 (04/28/20 0752)  BP: (!) 159/73 (04/28/20 0752)  SpO2: 98 % (04/28/20 0752)  BP Location: Left arm    Vital Signs Range (Last 24H):  Temp:  [97.7 °F (36.5 °C)-99 °F (37.2 °C)]   Pulse:  [58-76]   Resp:  [16-18]   BP: (116-159)/(60-73)   SpO2:  [92 %-98 %]   BP Location: Left arm    Physical Exam   Constitutional: She appears well-developed and well-nourished. No distress.   HENT:   Head: Normocephalic and atraumatic.   Eyes: Conjunctivae are normal. No scleral icterus.   Cardiovascular: Normal rate.   Pulmonary/Chest: Effort normal. No respiratory distress.   Musculoskeletal: She exhibits no edema or deformity.   Neurological: She is alert.   Skin: Skin is warm and dry.   Psychiatric: Cognition and memory are impaired. She is attentive.       Neurological Exam:   LOC: alert  Attention Span: Good   Language: Global aphasia  Articulation: Dysarthria  Orientation: Untestable due to severe aphasia   Visual Fields: Hemianopsia right  EOM (CN III, IV, VI): Gaze preference  left and but able to cross midline  Facial Movement (CN VII): Lower facial weakness on the Right  Motor: Arm left  Paresis: 4/5  Leg left  Paresis: 4/5  Arm right  Plegia 0/5  Leg right Plegia 0/5  Tone: Flaccid  RUE and RLE     Laboratory:  CMP:   No results for input(s): GLUCOSE, CALCIUM, ALBUMIN, PROT, NA, K, CO2, CL, BUN, CREATININE, ALKPHOS, ALT, AST, BILITOT in the last 24 hours.  BMP:   Recent Labs   Lab 04/27/20  0357      K 3.6      CO2 20*   BUN 21   CREATININE 0.8   CALCIUM 9.2     CBC:   Recent Labs   Lab 04/27/20  0357   WBC 7.26   RBC 3.75*   HGB 10.6*   HCT 33.9*      MCV 90   MCH 28.3   MCHC 31.3*     Lipid Panel:   Recent Labs   Lab 04/22/20  1720   CHOL 220*   LDLCALC 135.2   HDL 61   TRIG 119     Coagulation: No results for input(s): PT, INR, APTT in the last 168 hours.  Platelet Aggregation Study: No results for input(s): PLTAGG, PLTAGINTERP,  PLTAGREGLACO, ADPPLTAGGREG in the last 168 hours.  Hgb A1C:   Recent Labs   Lab 04/22/20  1720   HGBA1C 5.6     TSH:   Recent Labs   Lab 04/22/20  1720   TSH 3.990       Diagnostic Results     Brain imaging:     MRI Brain w/o contrast 4/25/20    Prominent areas of acute ischemia/infarct involving the left cerebral hemisphere as detailed above with additional focal areas involving the left cerebellum and a focus of the right cerebellum.  Focal area of signal hypointensity of the left thalamus may relate to an area of focal hemorrhagic conversion a large degree of hemorrhagic conversion is not appreciated.  Signal abnormality of the left internal carotid artery likely relates to diminished flow or potentially lack of flow, correlation with the recent angiographic procedure is recommended.     CT Head. Date: 04/22/20  Ill-defined region of decreased attenuation left insula anteriorly concerning for acute/recent infarction.  No significant mass effect or evidence for hemorrhagic conversion.       Vessel Imaging:    CTA Head/Neck 4/25/20  CT head: Evolving moderate to large regions of left MCA and PCA territory infarction as seen on prior MRI.  There is increased mass effect and edema particularly in left parietal and occipital components with localized mass effect without significant midline shift.  Intermediate density within the left temporal component concerning for component of petechial hemorrhage.  No evidence for extra-axial hemorrhage or hydrocephalus.  CTA head and neck: Continued occlusion left carotid artery and carotid bifurcation with reconstituted left distal cavernous ICA which may be retrograde or flow via the ophthalmic artery.  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  Taper configuration left distal P2 segment of the PCA concerning for stenosis and distal inclusion.  Atherosclerotic plaquing right carotid  bifurcation proximal ICA with approximately 70 percent right proximal ICA stenosis by NASCET criteria.  Degenerative changes cervical spine with mild superior endplate C3 vertebral body deformity concerning for fracture overall age indeterminate and likely remote.    Cerebral Angiogram 4/22/20  1.  Occluded left internal carotid artery at the origin treated by balloon angioplasty to less than 30% stenosis.  No embolization protection device used because distal ICA was occluded.  2.  Aspiration thrombectomy performed at left ICA terminus with TICI 2A reperfusion.    CTA OSH. Date: 04/22/20  L ICA occlusion        Cardiac imaging     TTE 4/23/20  · Normal left ventricular systolic function. The estimated ejection fraction is 70%.  · No wall motion abnormalities.  · Indeterminate left ventricular diastolic function.  · Normal right ventricular systolic function.  · The estimated PA systolic pressure is 32 mmHg.  · Normal central venous pressure (3 mmHg).  · Mild left atrial enlargement.      Kelley Sales PA-C  Comprehensive Stroke Center  Department of Vascular Neurology   [facility]

## 2020-04-28 NOTE — PROGRESS NOTES
[facility]  Adult Nutrition  Progress Note    SUMMARY       Recommendations  1.) Continue textured modified diet per SLP.   2.) Add Boost Pudding TID.   3.) Suggest MVI.   4.) Daily weights    Goals: 1.) Pt to consume/tolerate >75% of meals by follow up.   Nutrition Goal Status: goal not met  Communication of RD Recs: other (comment)(POC)    Reason for Assessment    Reason For Assessment: RD follow-up  Diagnosis: stroke/CVA  Relevant Medical History: HTN, HLD  Interdisciplinary Rounds: did not attend  General Information Comments: RD working remotely. Pt Lithuanian speaking, unable to provide translation services via phone at this time. Per RN, pt consumed 25% of breakfast and lunch today, appeared to prefer sweeter foods. 8# wt gain since last RD note, potentially fluid related. NFPE to be completed at a later date 2/2 COVID-19 precautions. Pt is at risk for malnutrition due to poor PO intake.   Nutrition Discharge Planning: Adequate intake to meet nutritional needs.     Nutrition Risk Screen    Nutrition Risk Screen: no indicators present    Nutrition/Diet History    Spiritual, Cultural Beliefs, Gnosticist Practices, Values that Affect Care: (Lithuanian speaking)  Food Allergies: NKFA  Factors Affecting Nutritional Intake: difficulty/impaired swallowing    Anthropometrics    Temp: 98.1 °F (36.7 °C)  Height: 5' (152.4 cm)  Height (inches): 60 in  Weight Method: Bed Scale  Weight: 52.3 kg (115 lb 4.8 oz)  Weight (lb): 115.3 lb  Ideal Body Weight (IBW), Female: 100 lb  % Ideal Body Weight, Female (lb): 107 %  BMI (Calculated): 22.5  BMI Grade: 18.5-24.9 - normal    Lab/Procedures/Meds    Pertinent Labs Reviewed: reviewed  Pertinent Labs Comments: Alb 3, CRP 52.4  Pertinent Medications Reviewed: reviewed  Pertinent Medications Comments: aspirin, statin, heparin, ondansetron, senna-docusate    Estimated/Assessed Needs    Weight Used For Calorie Calculations: 48.5 kg (106 lb 14.8 oz)  Energy Calorie  Requirements (kcal): 1103  Energy Need Method: [place]-St Jeor(x 1.25 PAL)  Protein Requirements: 58-68(g/day)  Weight Used For Protein Calculations: 48.5 kg (106 lb 14.8 oz)(1.2-1.4 g/kg)     Estimated Fluid Requirement Method: RDA Method(or per MD)  RDA Method (mL): 1103    Nutrition Prescription Ordered    Current Diet Order: puree  Nutrition Order Comments: nectar thickened liquids    Evaluation of Received Nutrient/Fluid Intake    I/O: -119mL since admit  Comments: LBM 4/27  % Intake of Estimated Energy Needs: 25 - 50 %  % Meal Intake: 25 - 50 %    Nutrition Risk    Level of Risk/Frequency of Follow-up: moderate(x1/week)     Assessment and Plan    Nutrition Problem  Swallowing/Chewing difficulty     Related to (etiology):   Stroke     Signs and Symptoms (as evidenced by):   Need for puree, nectar thickened liquids     Interventions(treatment strategy):  Collaboration of care with providers  Referral of care  Modified diet-puree, nectar thick     Nutrition Diagnosis Status:   Continues    Monitor and Evaluation    Food and Nutrient Intake: energy intake, food and beverage intake  Food and Nutrient Adminstration: diet order  Knowledge/Beliefs/Attitudes: food and nutrition knowledge/skill  Physical Activity and Function: nutrition-related ADLs and IADLs  Anthropometric Measurements: weight, weight change, body mass index  Biochemical Data, Medical Tests and Procedures: electrolyte and renal panel, gastrointestinal profile, glucose/endocrine profile  Nutrition-Focused Physical Findings: overall appearance     Malnutrition Assessment  Due to recent hospital wide restrictions to limit the transfer of COVID-19, we are not performing any physical exams at this time. All S/S will be observational; NFPE to be performed at a future date.    Nutrition Follow-Up    RD Follow-up?: Yes

## 2020-04-28 NOTE — PLAN OF CARE
Problem: SLP Goal  Goal: SLP Goal  Description  Speech Language Pathology Goals  Goals expected to be met by 4/30  1. Pt will tolerate puree diet with nectar thick liquids without overt s/s aspiration.   2. Pt will tolerate trials of thin liquids without overt s/s aspiration.   3. Pt will tolerate trials of solids without overt s/s aspiration.  4. Pt will answer simple y/n questions via any modality with 60% accy.  5. Pt will follow simple commands with 50% accy given max cues.   6. Pt will complete auto speech tasks with 30% accy given max cues.      Outcome: Ongoing, Progressing    Rec downgrade diet to puree with continued nectar thick liquids.  Pt may benefit from nutritional supplement per Dietary recs.  DARREN Box, CCC/SLP  4/28/2020

## 2020-04-28 NOTE — PLAN OF CARE
Pt goals remain appropriate, continue w/ OT POC.    Problem: Occupational Therapy Goal  Goal: Occupational Therapy Goal  Description  Goals set on 4/23 with expiration date 5/8/2020:  Patient will increase functional independence with ADLs by performing:    Supine <> Sit with Min Assistance.  Feeding with Min  Assistance.  Grooming while standing at sink with Min  Assistance.  UB Dressing with Min  Assistance.  LB Dressing with Min Assistance.  Step transfer with Min Assistance with DME as needed.  Pt will demonstrate understanding of education provided regarding energy conservation and task modification through teach-back method.   Patient will increase functional independence with ADLs by performing:  Patient will demonstrate assistance as needed with HEP for R UE weight bearing.      Patient's family / caregiver will demonstrate assistance as needed and safety with assisting patient with self-care skills and functional mobility.      Patient's family / caregiver will demonstrate assistance as needed with HEP, A/AAROM and changes in bed positioning.               Outcome: Ongoing, Progressing    Rocco ARTIS  4/28/2020

## 2020-04-28 NOTE — ASSESSMENT & PLAN NOTE
Stroke risk factor  SBP < 160 in the setting of MAICO ICA atherosclerosis  Home meds unknown though pt's BP at goal over last 24 hrs without use of any anti-hypertensives.  Will continue monitoring

## 2020-04-28 NOTE — PLAN OF CARE
Spoke with Harmony at [facility] in [place].  They are a non Medicaid facility and could only accept her for 20 days before she had a $175 copay per day. They don't they can meet her needs in that short time, so they have declined patient.    Julie Haase RN  Case Management [phone]

## 2020-04-28 NOTE — PROGRESS NOTES
[facility]  Physical Medicine & Rehab  Progress Note    Patient Name: Teresa Vaughan  MRN: [id number]  Admission Date: 4/22/2020  Length of Stay: 6 days  Attending Physician: Kvng Decker DO    Subjective:     Principal Problem:Stroke due to occlusion of left carotid artery    Hospital Course:   4/23/20: Evaluated by PT & OT. Bed mobility modA- maxA x 2. Sit to stand maxA. Grooming maxA. LBD totalA.   4/24/20: Participated w/ PT & OT. Bed mobility totalA- totalA x 2 ppl. Grooming totalA.   4/27/20: Participated w/ PT. Bed mobility totalA- totalA x 2 ppl. Sit to stand totalA x 2ppl.     Interval History 4/28/2020:  Patient is seen for follow-up rehab evaluation and recommendations: Alert and participating with therapy.     HPI, Past Medical, Family, and Social History remains the same as documented in the initial encounter.    Scheduled Medications:    aspirin  81 mg Oral Daily    atorvastatin  40 mg Oral Daily    clopidogreL  75 mg Oral Daily    heparin (porcine)  5,000 Units Subcutaneous Q8H    polyethylene glycol  17 g Oral Daily    senna-docusate 8.6-50 mg  1 tablet Oral BID       Diagnostic Results:   Labs; reviewed     PRN Medications: acetaminophen, ondansetron    Review of Systems   Reason unable to perform ROS: Aphasia, spoke a couple words.   Constitutional: Positive for activity change.   Neurological: Positive for speech difficulty and weakness.     Objective:     Vital Signs (Most Recent):  Temp: 98.1 °F (36.7 °C) (04/28/20 1106)  Pulse: 69 (04/28/20 1140)  Resp: 18 (04/28/20 1106)  BP: (!) 141/64 (04/28/20 1106)  SpO2: (!) 94 % (04/28/20 1106)    Vital Signs (24h Range):  Temp:  [97.7 °F (36.5 °C)-99 °F (37.2 °C)] 98.1 °F (36.7 °C)  Pulse:  [58-76] 69  Resp:  [16-18] 18  SpO2:  [92 %-98 %] 94 %  BP: (126-159)/(60-73) 141/64     Physical Exam   Constitutional: She appears well-developed and well-nourished.   HENT:   Head: Normocephalic and atraumatic.   Eyes: Pupils are  equal, round, and reactive to light. EOM are normal.   Neck: Normal range of motion. Neck supple.   Pulmonary/Chest: Effort normal. No respiratory distress.   Abdominal: Normal appearance.   Musculoskeletal: She exhibits no tenderness or deformity.   Neurological: She is alert.   - L gaze   - Aphasia  - spoke a few words  - made eye contact and picked up L arm as a simple commands   Skin: Skin is warm and dry.   Psychiatric: She has a normal mood and affect. Her behavior is normal.   Nursing note and vitals reviewed.          Assessment/Plan:      * Stroke due to occlusion of left carotid artery  - went to IR for angioplasty and aspiration thrombectomy.   - MRI demonstrated involvment of L MCA, L PCA and L cerebellar aa with a R cerebellar finding as well.     - Related to prolonged/acute hospital course.     Recommendations  -  Encourage mobility, OOB in chair at least 3 hours per day, and early ambulation as appropriate  -  PT/OT evaluate and treat  -  Pain management  -  Monitor for and prevent skin breakdown and pressure ulcers  · Early mobility, repositioning/weight shifting every 20-30 minutes when sitting, turn patient every 2 hours, proper mattress/overlay and chair cushioning, pressure relief/heel protector boots  -  DVT prophylaxis    -  Reviewed discharge options (IP rehab, SNF, HH therapy, and OP therapy)    Essential hypertension  - improved    Recommend Inpatient Rehab.      Sherice Blanton NP  Department of Physical Medicine & Rehab   [facility]

## 2020-04-28 NOTE — ASSESSMENT & PLAN NOTE
Teresa Vaughan is a 79 y.o. female with PMHx of HTN and HLD who presented to OSH with R-sided weakness and aphasia. She was seen via telemedicine and not eligible for tPA due to timeline. CTA obtained at OSH with L ICA occlusion and [place] [facility] [place]. [place] head on arrival to [facility] with ischemic changes in L insula. Patient went to IR for angioplasty and aspiration thrombectomy with subsequent TICI 2A reperfusion. MRI Brain demonstrated acute infarcts involving L MCA, L PCA, and MAICO cerebellum. TTE showed mild L atrial enlargement; otherwise findings WNL. Of note, patient tested negative for COVID-19.    Repeat CTA Head/Neck 4/25 showed re-occlusion at the L carotid bifurcation as well as small degree of petechial hemorrhage. Stroke etiology suspected ESUS; plans for 30-day cardiac event monitoring following SNF discharge.    Neuro exam remains stable. CM/SW continuing efforts toward placement.       Antithrombotics for secondary stroke prevention: Antiplatelets: Aspirin: 81 mg daily, Plavix 75mg Daily  Statins for secondary stroke prevention and hyperlipidemia, if present:   Statins: Atorvastatin- 40 mg daily  Aggressive risk factor modification: HTN, HLD, R ICA stenosis  Rehab efforts: The patient has been evaluated by a stroke team provider and the therapy needs have been fully considered based off the presenting complaints and exam findings. The following therapy evaluations are needed: PT evaluate and treat, OT evaluate and treat, SLP evaluate and treat - Dispo SNF (pt was living alone previously)  Diagnostics ordered/pending: none  VTE prophylaxis: Heparin 5000 units SQ every 8 hours, place SCDs  BP parameters: Infarct, Carotid stenosis: SBP <160

## 2020-04-29 LAB
ANION GAP SERPL CALC-SCNC: 9 MMOL/L (ref 8–16)
BASOPHILS # BLD AUTO: 0.05 K/UL (ref 0–0.2)
BASOPHILS NFR BLD: 0.7 % (ref 0–1.9)
BUN SERPL-MCNC: 17 MG/DL (ref 8–23)
CALCIUM SERPL-MCNC: 8.8 MG/DL (ref 8.7–10.5)
CHLORIDE SERPL-SCNC: 107 MMOL/L (ref 95–110)
CO2 SERPL-SCNC: 25 MMOL/L (ref 23–29)
CREAT SERPL-MCNC: 0.7 MG/DL (ref 0.5–1.4)
DIFFERENTIAL METHOD: ABNORMAL
EOSINOPHIL # BLD AUTO: 0.3 K/UL (ref 0–0.5)
EOSINOPHIL NFR BLD: 4 % (ref 0–8)
ERYTHROCYTE [DISTWIDTH] IN BLOOD BY AUTOMATED COUNT: 13.1 % (ref 11.5–14.5)
EST. GFR  (AFRICAN AMERICAN): >60 ML/MIN/1.73 M^2
EST. GFR  (NON AFRICAN AMERICAN): >60 ML/MIN/1.73 M^2
GLUCOSE SERPL-MCNC: 106 MG/DL (ref 70–110)
HCT VFR BLD AUTO: 31.6 % (ref 37–48.5)
HGB BLD-MCNC: 10 G/DL (ref 12–16)
IMM GRANULOCYTES # BLD AUTO: 0.02 K/UL (ref 0–0.04)
IMM GRANULOCYTES NFR BLD AUTO: 0.3 % (ref 0–0.5)
LYMPHOCYTES # BLD AUTO: 1.5 K/UL (ref 1–4.8)
LYMPHOCYTES NFR BLD: 20.5 % (ref 18–48)
MAGNESIUM SERPL-MCNC: 2 MG/DL (ref 1.6–2.6)
MCH RBC QN AUTO: 28.7 PG (ref 27–31)
MCHC RBC AUTO-ENTMCNC: 31.6 G/DL (ref 32–36)
MCV RBC AUTO: 91 FL (ref 82–98)
MONOCYTES # BLD AUTO: 0.8 K/UL (ref 0.3–1)
MONOCYTES NFR BLD: 11.5 % (ref 4–15)
NEUTROPHILS # BLD AUTO: 4.6 K/UL (ref 1.8–7.7)
NEUTROPHILS NFR BLD: 63 % (ref 38–73)
NRBC BLD-RTO: 0 /100 WBC
PHOSPHATE SERPL-MCNC: 3.3 MG/DL (ref 2.7–4.5)
PLATELET # BLD AUTO: 290 K/UL (ref 150–350)
PMV BLD AUTO: 11.1 FL (ref 9.2–12.9)
POTASSIUM SERPL-SCNC: 3.7 MMOL/L (ref 3.5–5.1)
RBC # BLD AUTO: 3.48 M/UL (ref 4–5.4)
SARS-COV-2 RNA RESP QL NAA+PROBE: NOT DETECTED
SODIUM SERPL-SCNC: 141 MMOL/L (ref 136–145)
WBC # BLD AUTO: 7.32 K/UL (ref 3.9–12.7)

## 2020-04-29 PROCEDURE — 25000003 PHARM REV CODE 250: Performed by: INTERNAL MEDICINE

## 2020-04-29 PROCEDURE — 25000003 PHARM REV CODE 250: Performed by: PHYSICIAN ASSISTANT

## 2020-04-29 PROCEDURE — 63600175 PHARM REV CODE 636 W HCPCS: Performed by: INTERNAL MEDICINE

## 2020-04-29 PROCEDURE — 85025 COMPLETE CBC W/AUTO DIFF WBC: CPT

## 2020-04-29 PROCEDURE — 80048 BASIC METABOLIC PNL TOTAL CA: CPT

## 2020-04-29 PROCEDURE — 84100 ASSAY OF PHOSPHORUS: CPT

## 2020-04-29 PROCEDURE — 36415 COLL VENOUS BLD VENIPUNCTURE: CPT

## 2020-04-29 PROCEDURE — 83735 ASSAY OF MAGNESIUM: CPT

## 2020-04-29 PROCEDURE — 94760 N-INVAS EAR/PLS OXIMETRY 1: CPT

## 2020-04-29 PROCEDURE — U0002 COVID-19 LAB TEST NON-CDC: HCPCS

## 2020-04-29 PROCEDURE — 97112 NEUROMUSCULAR REEDUCATION: CPT

## 2020-04-29 PROCEDURE — 99233 SBSQ HOSP IP/OBS HIGH 50: CPT | Mod: ,,, | Performed by: PSYCHIATRY & NEUROLOGY

## 2020-04-29 PROCEDURE — 20600001 HC STEP DOWN PRIVATE ROOM

## 2020-04-29 PROCEDURE — 97535 SELF CARE MNGMENT TRAINING: CPT

## 2020-04-29 PROCEDURE — 99233 PR SUBSEQUENT HOSPITAL CARE,LEVL III: ICD-10-PCS | Mod: ,,, | Performed by: PSYCHIATRY & NEUROLOGY

## 2020-04-29 RX ADMIN — STANDARDIZED SENNA CONCENTRATE AND DOCUSATE SODIUM 1 TABLET: 8.6; 5 TABLET ORAL at 08:04

## 2020-04-29 RX ADMIN — HEPARIN SODIUM 5000 UNITS: 5000 INJECTION INTRAVENOUS; SUBCUTANEOUS at 09:04

## 2020-04-29 RX ADMIN — ASPIRIN 81 MG: 81 TABLET, COATED ORAL at 09:04

## 2020-04-29 RX ADMIN — ATORVASTATIN CALCIUM 40 MG: 20 TABLET, FILM COATED ORAL at 09:04

## 2020-04-29 RX ADMIN — THERA TABS 1 TABLET: TAB at 09:04

## 2020-04-29 RX ADMIN — HEPARIN SODIUM 5000 UNITS: 5000 INJECTION INTRAVENOUS; SUBCUTANEOUS at 06:04

## 2020-04-29 RX ADMIN — STANDARDIZED SENNA CONCENTRATE AND DOCUSATE SODIUM 1 TABLET: 8.6; 5 TABLET ORAL at 09:04

## 2020-04-29 RX ADMIN — CLOPIDOGREL BISULFATE 75 MG: 75 TABLET ORAL at 09:04

## 2020-04-29 RX ADMIN — HEPARIN SODIUM 5000 UNITS: 5000 INJECTION INTRAVENOUS; SUBCUTANEOUS at 02:04

## 2020-04-29 RX ADMIN — POLYETHYLENE GLYCOL 3350 17 G: 17 POWDER, FOR SOLUTION ORAL at 09:04

## 2020-04-29 NOTE — PLAN OF CARE
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       Patient is homebound due to:  Stroke due to occlusion of left carotid artery    Allergies:Review of patient's allergies indicates:  No Known Allergies    Vitals:  Every shift (Skilled Nursing patients)    Diet: Cardiac Puree diet, [place]-thickened liquids    Supplement:  1 container three times a day with meals                         Type:  Boost pudding    Acitivities: As tolerated       LABS:  Per facility protocol    Nursing Precautions:    - Aspiration precautions:             - Total assistance with meals            -  Upright 90 degrees befor during and after meals             -  Suction at bedside          - Fall precautions per nursing home protocol   - Seizure precaution per [facility] protocol   - Decubitus precautions:        -  for positioning   - Pressure reducing foam mattress   - Turn patient every two hours. Use wedge pillows to anchor patient    CONSULTS:     Physical Therapy to evaluate and treat     Occupational Therapy to evaluate and treat     Speech Therapy  to evaluate and treat     Nutrition to evaluate and recommend diet     Psychiatry to evaluate and follow patients for delirium, if needed    MISCELLANEOUS CARE:       Please ensure that patient has follow up appointments  scheduled. Necessary appointments are listed below. Patient will also need a  30 day cardiac event monitor.     Routine Skin for Bedridden Patients:  Apply moisture barrier cream to all    skin folds and wet areas in perineal area daily and after baths and                           all bowel movements.    Medications: Discontinue all previous medication orders, if any. See new list below.   Teresa Vaughan   Home Medication Instructions URDY:[id number]    Printed on:04/28/20 4132   Medication Information                      aspirin (ECOTRIN) 81 MG EC tablet  Take 1 tablet (81 mg total) by mouth once daily.             atorvastatin (LIPITOR) 40 MG tablet  Take 1 tablet (40 mg total) by mouth once daily.             clopidogreL (PLAVIX) 75 mg tablet  Take 1 tablet (75 mg total) by mouth once daily.             multivitamin capsule  Take 1 capsule by mouth once daily.             polyethylene glycol (GLYCOLAX) 17 gram PwPk  Take 17 g by mouth once daily.             senna-docusate 8.6-50 mg (PERICOLACE) 8.6-50 mg per tablet  Take 1 tablet by mouth 2 (two) times daily.                   Follow-up With  Details  Why  Contact Info     Kori Richardson NP  In 1 week  Call your PCP to set up hospital follow-up appt within 1 week of discharge.  [address] [place] [id number]  [phone]     [facility] - Vascular Surgery  In 3 weeks  Someone will contact you to schedule your appointment with vascular surgery. Please contact phone number listed if you do not receive a phone call within 1 week  [address]  [facility] [phone]   [facility] VASCULAR NEUROLOGY  In 6 weeks  Someone will call you to set up hospital follow-up in 4-8 weeks. If nobody calls within 1 week, call number above to schedule an appt.  [address]  [facility] [address]  [phone]             Sherice Marroquin PA-C  04/29/2020

## 2020-04-29 NOTE — PROGRESS NOTES
[facility]  Vascular Neurology  Comprehensive Stroke Center  Progress Note    Assessment/Plan:     * Stroke due to occlusion of left carotid artery  Teresa Vaughan is a 79 y.o. female with PMHx of HTN and HLD who presented to OSH with R-sided weakness and aphasia. She was seen via telemedicine and not eligible for tPA due to timeline. CTA obtained at OSH with L ICA occlusion and patient transferred to [facility] for possible IR intervention. CT head on arrival to [facility] with ischemic changes in L insula. Patient went to IR for angioplasty and aspiration thrombectomy with subsequent TICI 2A reperfusion. MRI Brain demonstrated acute infarcts involving L MCA, L PCA, and MAICO cerebellum. TTE showed mild L atrial enlargement; otherwise findings WNL. Of note, patient tested negative for COVID-19.    Repeat CTA Head/Neck 4/25 showed re-occlusion at the L carotid bifurcation as well as small degree of petechial hemorrhage. Stroke etiology suspected ESUS; plans for 30-day cardiac event monitoring following SNF discharge.    Neuro exam remains stable. CM/SW continuing efforts toward SNF placement.       Antithrombotics for secondary stroke prevention: Antiplatelets: Aspirin: 81 mg daily, Plavix 75mg Daily  Statins for secondary stroke prevention and hyperlipidemia, if present:   Statins: Atorvastatin- 40 mg daily  Aggressive risk factor modification: HTN, HLD, R ICA stenosis  Rehab efforts: The patient has been evaluated by a stroke team provider and the therapy needs have been fully considered based off the presenting complaints and exam findings. The following therapy evaluations are needed: PT evaluate and treat, OT evaluate and treat, SLP evaluate and treat - Dispo SNF (pt was living alone previously)  Diagnostics ordered/pending: none  VTE prophylaxis: Heparin 5000 units SQ every 8 hours, place SCDs  BP parameters: Infarct, Carotid stenosis: SBP <160    Stenosis of right internal carotid artery  Stroke risk  factor  CTA 4/25 demonstrated L ICA and bifurcation, as well as R ICA atherosclerotic plaquing with approx 70% stenosis  R ICA stenosis currently appears asymptomatic.  Plans for Ambulatory referral to Vascular Surgery at discharge, though pt would need to recover more before determining surgical candidacy.   DAPT, statin    Dysphagia  2/2 stroke - also in the setting of severe aphasia  Diet had been upgraded to mechanical soft on 4/27 however per SLP 4/28 AM, pt not tolerating well; changed diet back to puree with nectar-thick liquids.   Continue aggressive SLP    Cytotoxic cerebral edema  Upon review of brain imaging, moderate area of cytotoxic cerebral edema identified in the territory of the Left middle cerebral artery. There is associated mass effect.   We will continue to monitor the patients clinical exam for any worsening of symptoms which may indicate expansion of the stroke or the area of edema resulting in such clinical change.   Pattern is suggestive of an embolic etiology.    Mixed hyperlipidemia  Stroke risk factor    Home medications unknown  Atorvastatin 40 mg daily    Essential hypertension  Stroke risk factor  SBP < 160 in the setting of MAICO ICA atherosclerosis  Home meds unknown though pt's BP at goal over last 24 hrs without use of any anti-hypertensives.  Will continue monitoring         Teresa Vaughan is a 79 y.o. female with PMHx of HTN and HLD who presented to [facility] with aphasia and RSW. Patient went to ED after being found outside wandering around by her neighbors. She was seen via telemedicine and was not a candidate for tPA . CTA at OSH revealed L ICA occlusion. [place] [facility] [place]. Angioplasty and Aspiration thrombectomy completed at that time. Patient primarily Nepali speaking and aphasic.     4/25: Patient stepped down to stroke primary service. CTA head and neck complete- L ICA re occluded but reconstitutes distally.  Neuro exam unchanged.   4/26: Neuro exam stable. Stroke etiology likely ESUS. Patient to discharge with 30 day event monitor.  4/27: Patient remains stable; monitoring BP. Pending SNF placement. Labs qOD.  4/28: Diet had been upgraded yesterday however per SLP this AM, pt not tolerating well; changed diet back to puree with nectar-thick liquids. Neuro exam remains stable. CM/SW continuing efforts toward placement.   4/29: arranging placement at SNF, patient medically stable for discharge    STROKE DOCUMENTATION   Acute Stroke Times   Last Known Normal Date: 04/21/20  Last Known Normal Time: 1800  Symptom Onset Date: (unknown)  Symptom Onset Time: (unknown)  Stroke Team Called Date: 04/22/20  Stroke Team Called Time: 1415  Stroke Team Arrival Date: 04/22/20  Stroke Team Arrival Time: 1415  CT Interpretation Time: 1425  Decision to Treat Time for Alteplase: (decision made at OSH via telemedicine)  Decision to Treat Time for IR: 1425    NIH Scale:  1a. Level of Consciousness: 0-->Alert, keenly responsive  1b. LOC Questions: 2-->Answers neither question correctly  1c. LOC Commands: 1-->Performs one task correctly  2. Best Gaze: 1-->Partial gaze palsy, gaze is abnormal in one or both eyes, but forced deviation or total gaze paresis is not present  3. Visual: 2-->Complete hemianopia  4. Facial Palsy: 2-->Partial paralysis (total or near-total paralysis of lower face)  5a. Motor Arm, Left: 0-->No drift, limb holds 90 (or 45) degrees for full 10 secs  5b. Motor Arm, Right: 4-->No movement  6a. Motor Leg, Left: 0-->No drift, leg holds 30 degree position for full 5 secs  6b. Motor Leg, Right: 4-->No movement  7. Limb Ataxia: 0-->Absent  8. Sensory: 0-->Normal, no sensory loss  9. Best Language: 3-->Mute, global aphasia, no usable speech or auditory comprehension  10. Dysarthria: 2-->Severe dysarthria, patients speech is so slurred as to be unintelligible in the absence of or out of proportion to any dysphasia, or is  mute/anarthric  11. Extinction and Inattention (formerly Neglect): 0-->No abnormality  Total (NIH Stroke Scale): 21       Modified Delmis    [place] Coma Scale:    ABCD2 Score:    [id number] Score:   HAS -BLED Score:   ICH Score:   Hunt & Dahl Classification:      Hemorrhagic change of an Ischemic Stroke: Does this patient have an ischemic stroke with hemorrhagic changes? Yes, Grading Scale: HI Type 1 (HI-1) = small petechiae along the margins of the infarct. Is this a symptomatic change?  No - Hemorrhage is not clinically significant     Neurologic Chief Complaint: Right-sided weakness    Subjective:     Interval History: Patient is seen for follow-up neurological assessment and treatment recommendations: SEBASTIÁNEON. Patient waiting SNF placement, medically ready for discharge     HPI, Past Medical, Family, and Social History remains the same as documented in the initial encounter.     Review of Systems   Constitutional: Negative for chills, fatigue and fever.   Eyes: Positive for visual disturbance. Negative for discharge.   Respiratory: Negative for cough.    Gastrointestinal: Negative for vomiting.   Neurological: Positive for facial asymmetry, speech difficulty and weakness.     Scheduled Meds:   aspirin  81 mg Oral Daily    atorvastatin  40 mg Oral Daily    clopidogreL  75 mg Oral Daily    heparin (porcine)  5,000 Units Subcutaneous Q8H    multivitamin  1 tablet Oral Daily    polyethylene glycol  17 g Oral Daily    senna-docusate 8.6-50 mg  1 tablet Oral BID     Continuous Infusions:  PRN Meds:acetaminophen, ondansetron    Objective:     Vital Signs (Most Recent):  Temp: 98.2 °F (36.8 °C) (04/29/20 0738)  Pulse: 70 (04/29/20 0738)  Resp: 16 (04/29/20 0738)  BP: 133/70 (04/29/20 0738)  SpO2: 98 % (04/29/20 0738)  BP Location: Left arm    Vital Signs Range (Last 24H):  Temp:  [97 °F (36.1 °C)-99.5 °F (37.5 °C)]   Pulse:  [66-79]   Resp:  [16-18]   BP: (133-153)/(64-73)   SpO2:  [92 %-98 %]   BP Location:  Left arm    Physical Exam   Constitutional: She appears well-developed and well-nourished. No distress.   HENT:   Head: Normocephalic and atraumatic.   Cardiovascular: Normal rate.   Pulmonary/Chest: Effort normal. No respiratory distress.   Neurological: She is alert.   Skin: Skin is warm and dry.   Psychiatric: Cognition and memory are impaired. She is attentive.   Vitals reviewed.      Neurological Exam:   LOC: alert  Attention Span: Good   Language: Global aphasia  Articulation: Dysarthria  Orientation: Untestable due to severe aphasia   Visual Fields: Hemianopsia right  EOM (CN III, IV, VI): Gaze preference  left and but able to cross midline  Facial Movement (CN VII): Lower facial weakness on the Right  Motor: Arm left  Paresis: 4/5  Leg left  Paresis: 4/5  Arm right  Plegia 0/5  Leg right Plegia 0/5  Tone: Flaccid  RUE and RLE     Laboratory:  CMP:   Recent Labs   Lab 04/29/20  0633   CALCIUM 8.8      K 3.7   CO2 25      BUN 17   CREATININE 0.7     BMP:   Recent Labs   Lab 04/29/20  0633      K 3.7      CO2 25   BUN 17   CREATININE 0.7   CALCIUM 8.8     CBC:   Recent Labs   Lab 04/29/20  0633   WBC 7.32   RBC 3.48*   HGB 10.0*   HCT 31.6*      MCV 91   MCH 28.7   MCHC 31.6*     Lipid Panel:   Recent Labs   Lab 04/22/20  1720   CHOL 220*   LDLCALC 135.2   HDL 61   TRIG 119     Coagulation: No results for input(s): PT, INR, APTT in the last 168 hours.  Platelet Aggregation Study: No results for input(s): PLTAGG, PLTAGINTERP, PLTAGREGLACO, ADPPLTAGGREG in the last 168 hours.  Hgb A1C:   Recent Labs   Lab 04/22/20  1720   HGBA1C 5.6     TSH:   Recent Labs   Lab 04/22/20  1720   TSH 3.990       Diagnostic Results     Brain imaging:     MRI Brain w/o contrast 4/25/20    Prominent areas of acute ischemia/infarct involving the left cerebral hemisphere as detailed above with additional focal areas involving the left cerebellum and a focus of the right cerebellum.  Focal area of signal  hypointensity of the left thalamus may relate to an area of focal hemorrhagic conversion a large degree of hemorrhagic conversion is not appreciated.  Signal abnormality of the left internal carotid artery likely relates to diminished flow or potentially lack of flow, correlation with the recent angiographic procedure is recommended.     CT Head. Date: 04/22/20  Ill-defined region of decreased attenuation left insula anteriorly concerning for acute/recent infarction.  No significant mass effect or evidence for hemorrhagic conversion.       Vessel Imaging:    CTA Head/Neck 4/25/20  CT head: Evolving moderate to large regions of left MCA and PCA territory infarction as seen on prior MRI.  There is increased mass effect and edema particularly in left parietal and occipital components with localized mass effect without significant midline shift.  Intermediate density within the left temporal component concerning for component of petechial hemorrhage.  No evidence for extra-axial hemorrhage or hydrocephalus.  CTA head and neck: Continued occlusion left carotid artery and carotid bifurcation with reconstituted left distal cavernous ICA which may be retrograde or flow via the ophthalmic artery.  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  Taper configuration left distal P2 segment of the PCA concerning for stenosis and distal inclusion.  Atherosclerotic plaquing right carotid bifurcation proximal ICA with approximately 70 percent right proximal ICA stenosis by NASCET criteria.  Degenerative changes cervical spine with mild superior endplate C3 vertebral body deformity concerning for fracture overall age indeterminate and likely remote.    Cerebral Angiogram 4/22/20  1.  Occluded left internal carotid artery at the origin treated by balloon angioplasty to less than 30% stenosis.  No embolization protection device used because distal ICA  was occluded.  2.  Aspiration thrombectomy performed at left ICA terminus with TICI 2A reperfusion.    CTA OSH. Date: 04/22/20  L ICA occlusion        Cardiac imaging     TTE 4/23/20  · Normal left ventricular systolic function. The estimated ejection fraction is 70%.  · No wall motion abnormalities.  · Indeterminate left ventricular diastolic function.  · Normal right ventricular systolic function.  · The estimated PA systolic pressure is 32 mmHg.  · Normal central venous pressure (3 mmHg).  · Mild left atrial enlargement.      Sherice Marroquin PA-C  Comprehensive Stroke Center  Department of Vascular Neurology   [facility]

## 2020-04-29 NOTE — PLAN OF CARE
Patient was alert and nonverbal to nursing care. Gabonese Speaking.  Sleeping in bed comfortably Vital signs WNL NSR 60s & 70s. No c/o of pain or discomfort. No cardiopulmonary distress.  No chest pain. No SOB. Breathing even and unlabored. IV patent and saline locked. Comfort/Safety measure maintained. Right sided weakness.  Total care. ADL care provided during the shift. Fall precaution maintained. Fluids encouraged. Call light/bedside table within reach.  Will continue to monitor patient for change of condition.

## 2020-04-29 NOTE — PLAN OF CARE
Pt goals remain appropriate, continue w/ OT POC.    Problem: Occupational Therapy Goal  Goal: Occupational Therapy Goal  Description  Goals set on 4/23 with expiration date 5/8/2020:  Patient will increase functional independence with ADLs by performing:    Supine <> Sit with Min Assistance.  Feeding with Min  Assistance.  Grooming while standing at sink with Min  Assistance.  UB Dressing with Min  Assistance.  LB Dressing with Min Assistance.  Step transfer with Min Assistance with DME as needed.  Pt will demonstrate understanding of education provided regarding energy conservation and task modification through teach-back method.   Patient will increase functional independence with ADLs by performing:  Patient will demonstrate assistance as needed with HEP for R UE weight bearing.      Patient's family / caregiver will demonstrate assistance as needed and safety with assisting patient with self-care skills and functional mobility.      Patient's family / caregiver will demonstrate assistance as needed with HEP, A/AAROM and changes in bed positioning.               Outcome: Ongoing, Progressing  Rocco ARTIS  4/29/2020

## 2020-04-29 NOTE — SUBJECTIVE & OBJECTIVE
Neurologic Chief Complaint: Right-sided weakness    Subjective:     Interval History: Patient is seen for follow-up neurological assessment and treatment recommendations: CARLOS EDUARDO. Patient waiting SNF placement, medically ready for discharge     HPI, Past Medical, Family, and Social History remains the same as documented in the initial encounter.     Review of Systems   Constitutional: Negative for chills, fatigue and fever.   Eyes: Positive for visual disturbance. Negative for discharge.   Respiratory: Negative for cough.    Gastrointestinal: Negative for vomiting.   Neurological: Positive for facial asymmetry, speech difficulty and weakness.     Scheduled Meds:   aspirin  81 mg Oral Daily    atorvastatin  40 mg Oral Daily    clopidogreL  75 mg Oral Daily    heparin (porcine)  5,000 Units Subcutaneous Q8H    multivitamin  1 tablet Oral Daily    polyethylene glycol  17 g Oral Daily    senna-docusate 8.6-50 mg  1 tablet Oral BID     Continuous Infusions:  PRN Meds:acetaminophen, ondansetron    Objective:     Vital Signs (Most Recent):  Temp: 98.2 °F (36.8 °C) (04/29/20 0738)  Pulse: 70 (04/29/20 0738)  Resp: 16 (04/29/20 0738)  BP: 133/70 (04/29/20 0738)  SpO2: 98 % (04/29/20 0738)  BP Location: Left arm    Vital Signs Range (Last 24H):  Temp:  [97 °F (36.1 °C)-99.5 °F (37.5 °C)]   Pulse:  [66-79]   Resp:  [16-18]   BP: (133-153)/(64-73)   SpO2:  [92 %-98 %]   BP Location: Left arm    Physical Exam   Constitutional: She appears well-developed and well-nourished. No distress.   HENT:   Head: Normocephalic and atraumatic.   Cardiovascular: Normal rate.   Pulmonary/Chest: Effort normal. No respiratory distress.   Neurological: She is alert.   Skin: Skin is warm and dry.   Psychiatric: Cognition and memory are impaired. She is attentive.   Vitals reviewed.      Neurological Exam:   LOC: alert  Attention Span: Good   Language: Global aphasia  Articulation: Dysarthria  Orientation: Untestable due to severe aphasia    Visual Fields: Hemianopsia right  EOM (CN III, IV, VI): Gaze preference  left and but able to cross midline  Facial Movement (CN VII): Lower facial weakness on the Right  Motor: Arm left  Paresis: 4/5  Leg left  Paresis: 4/5  Arm right  Plegia 0/5  Leg right Plegia 0/5  Tone: Flaccid  RUE and RLE     Laboratory:  CMP:   Recent Labs   Lab 04/29/20  0633   CALCIUM 8.8      K 3.7   CO2 25      BUN 17   CREATININE 0.7     BMP:   Recent Labs   Lab 04/29/20  0633      K 3.7      CO2 25   BUN 17   CREATININE 0.7   CALCIUM 8.8     CBC:   Recent Labs   Lab 04/29/20  0633   WBC 7.32   RBC 3.48*   HGB 10.0*   HCT 31.6*      MCV 91   MCH 28.7   MCHC 31.6*     Lipid Panel:   Recent Labs   Lab 04/22/20  1720   CHOL 220*   LDLCALC 135.2   HDL 61   TRIG 119     Coagulation: No results for input(s): PT, INR, APTT in the last 168 hours.  Platelet Aggregation Study: No results for input(s): PLTAGG, PLTAGINTERP, PLTAGREGLACO, ADPPLTAGGREG in the last 168 hours.  Hgb A1C:   Recent Labs   Lab 04/22/20  1720   HGBA1C 5.6     TSH:   Recent Labs   Lab 04/22/20  1720   TSH 3.990       Diagnostic Results     Brain imaging:     MRI Brain w/o contrast 4/25/20    Prominent areas of acute ischemia/infarct involving the left cerebral hemisphere as detailed above with additional focal areas involving the left cerebellum and a focus of the right cerebellum.  Focal area of signal hypointensity of the left thalamus may relate to an area of focal hemorrhagic conversion a large degree of hemorrhagic conversion is not appreciated.  Signal abnormality of the left internal carotid artery likely relates to diminished flow or potentially lack of flow, correlation with the recent angiographic procedure is recommended.     CT Head. Date: 04/22/20  Ill-defined region of decreased attenuation left insula anteriorly concerning for acute/recent infarction.  No significant mass effect or evidence for hemorrhagic  conversion.       Vessel Imaging:    CTA Head/Neck 4/25/20  CT head: Evolving moderate to large regions of left MCA and PCA territory infarction as seen on prior MRI.  There is increased mass effect and edema particularly in left parietal and occipital components with localized mass effect without significant midline shift.  Intermediate density within the left temporal component concerning for component of petechial hemorrhage.  No evidence for extra-axial hemorrhage or hydrocephalus.  CTA head and neck: Continued occlusion left carotid artery and carotid bifurcation with reconstituted left distal cavernous ICA which may be retrograde or flow via the ophthalmic artery.  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  Taper configuration left distal P2 segment of the PCA concerning for stenosis and distal inclusion.  Atherosclerotic plaquing right carotid bifurcation proximal ICA with approximately 70 percent right proximal ICA stenosis by NASCET criteria.  Degenerative changes cervical spine with mild superior endplate C3 vertebral body deformity concerning for fracture overall age indeterminate and likely remote.    Cerebral Angiogram 4/22/20  1.  Occluded left internal carotid artery at the origin treated by balloon angioplasty to less than 30% stenosis.  No embolization protection device used because distal ICA was occluded.  2.  Aspiration thrombectomy performed at left ICA terminus with TICI 2A reperfusion.    CTA OSH. Date: 04/22/20  L ICA occlusion        Cardiac imaging     TTE 4/23/20  · Normal left ventricular systolic function. The estimated ejection fraction is 70%.  · No wall motion abnormalities.  · Indeterminate left ventricular diastolic function.  · Normal right ventricular systolic function.  · The estimated PA systolic pressure is 32 mmHg.  · Normal central venous pressure (3 mmHg).  · Mild left atrial  enlargement.

## 2020-04-29 NOTE — PLAN OF CARE
DANA received a call from Lisa with [facility] ([phone]) and she reports they received all of the documents needed for pt to be accepted. Lisa reports pt will be going to room 301, and she will contact DANA back after her nurse reviews the information to let her know if transportation can be scheduled.    10:10AM    DANA received a call from Mattie, the  at [facility] with concerns about them being able to accept pt. Mattie reports that pt has two follow up appointments in [place] and they are unable to get her there. Mattie also reports she had concerns about pt only being tested for COVID-19 once and wanted to know if she was experiencing any symptoms. DANA informed Mattie that pt has not experienced any symptoms of COVID. DANA also informed Mattie that she will find out from the medical team if pt's follow up appointments can be changed.    DANA spoke with the medical team regarding follow up appointments and contacted Mattie back. DANA informed Mattie that pt would be able to do virtual follow up visits. Mattie reports they will be able to accommodate that; however, pt will have to be retested for COVID-19 and once the results come back, she can be reassessed to come to their facility.    DANA updated medical team and was informed that another COVID-19 has been scheduled for pt. Once results are in DANA will provide them to [facility].    Tamy Cerna, DAVID  [facility]  Ext. [phone]

## 2020-04-29 NOTE — ASSESSMENT & PLAN NOTE
Teresa Vaughan is a 79 y.o. female with PMHx of HTN and HLD who presented to OSH with R-sided weakness and aphasia. She was seen via telemedicine and not eligible for tPA due to timeline. CTA obtained at OSH with L ICA occlusion and patient transferred to [facility] for possible IR intervention. CT head on arrival to [facility] with ischemic changes in L insula. Patient went to IR for angioplasty and aspiration thrombectomy with subsequent TICI 2A reperfusion. MRI Brain demonstrated acute infarcts involving L MCA, L PCA, and MAICO cerebellum. TTE showed mild L atrial enlargement; otherwise findings WNL. Of note, patient tested negative for COVID-19.    Repeat CTA Head/Neck 4/25 showed re-occlusion at the L carotid bifurcation as well as small degree of petechial hemorrhage. Stroke etiology suspected ESUS; plans for 30-day cardiac event monitoring following SNF discharge.    Neuro exam remains stable. CM/SW continuing efforts toward SNF placement.       Antithrombotics for secondary stroke prevention: Antiplatelets: Aspirin: 81 mg daily, Plavix 75mg Daily  Statins for secondary stroke prevention and hyperlipidemia, if present:   Statins: Atorvastatin- 40 mg daily  Aggressive risk factor modification: HTN, HLD, R ICA stenosis  Rehab efforts: The patient has been evaluated by a stroke team provider and the therapy needs have been fully considered based off the presenting complaints and exam findings. The following therapy evaluations are needed: PT evaluate and treat, OT evaluate and treat, SLP evaluate and treat - Dispo SNF (pt was living alone previously)  Diagnostics ordered/pending: none  VTE prophylaxis: Heparin 5000 units SQ every 8 hours, place SCDs  BP parameters: Infarct, Carotid stenosis: SBP <160

## 2020-04-29 NOTE — PT/OT/SLP PROGRESS
Occupational Therapy   Treatment    Name: Teresa Vaughan  MRN: [id number]  Admitting Diagnosis:  Stroke due to occlusion of left carotid artery       Recommendations:     Discharge Recommendations: nursing facility, skilled  Discharge Equipment Recommendations:  none  Barriers to discharge:  Decreased caregiver support, Inaccessible home environment    Assessment:     Teresa Vaughan is a 79 y.o. female with a medical diagnosis of Stroke due to occlusion of left carotid artery.  She presents with some response to usage of Hebrew phrases, and seems oriented to person.  Today her pushing on the left was increased and she was not able to control her bladder in standing. Performance deficits affecting function are weakness, impaired endurance, impaired sensation, impaired self care skills, impaired functional mobilty, impaired balance, decreased upper extremity function, decreased lower extremity function, impaired cardiopulmonary response to activity, decreased ROM, gait instability, visual deficits, impaired cognition, decreased safety awareness, impaired coordination, impaired fine motor, impaired joint extensibility.     Rehab Prognosis:  Fair; patient would benefit from acute skilled OT services to address these deficits and reach maximum level of function.       Plan:     Patient to be seen 3 x/week to address the above listed problems via self-care/home management, therapeutic activities, therapeutic exercises, neuromuscular re-education, cognitive retraining, sensory integration  · Plan of Care Expires: 05/23/20  · Plan of Care Reviewed with: patient    Subjective     Pain/Comfort:  · Pain Rating 1: 0/10  · Pain Rating Post-Intervention 2: 0/10    Objective:     Communicated with: RN prior to session.  Patient found HOB elevated with bed alarm, pressure relief boots, telemetry, PureWick upon OT entry to room.    General Precautions: Standard, aspiration, aphasia, fall   Orthopedic Precautions:N/A   Braces: N/A      Occupational Performance:     Bed Mobility:    · Patient completed Rolling/Turning to Left with  total assistance  · Patient completed Rolling/Turning to Right with total assistance  · Patient completed Scooting/Bridging with total assistance  · Patient completed Supine to Sit with total assistance  · Patient completed Sit to Supine with total assistance     Functional Mobility/Transfers:  · Patient completed Sit <> Stand Transfer with moderate assistance  with  hand-held assist   · Functional Mobility: Able to stand for ~ 3mins w/ Mod A for poor RLE support and pushing from the left side.     Activities of Daily Living:  · Toileting: total assistance when standing patient had an uncontrolled urination and bowel movement so Pt was returned to supine and clean and changed in bed.       [facility] 6 Click ADL: 6    Treatment & Education:  -Pt edu on OT role/POC  -Importance of OOB activity with staff assistance  -Safety during functional t/f and mobility  - White board updated  - Multiple self care tasks/functional mobility completed-- assistance level noted above  - All questions/concerns answered within OT scope of practice      Patient left HOB elevated with all lines intact, call button in reach, bed alarm on and RN notifiedEducation:      GOALS:   Multidisciplinary Problems     Occupational Therapy Goals        Problem: Occupational Therapy Goal    Goal Priority Disciplines Outcome Interventions   Occupational Therapy Goal     OT, PT/OT Ongoing, Progressing    Description:  Goals set on 4/23 with expiration date 5/8/2020:  Patient will increase functional independence with ADLs by performing:    Supine <> Sit with Min Assistance.  Feeding with Min  Assistance.  Grooming while standing at sink with Min  Assistance.  UB Dressing with Min  Assistance.  LB Dressing with Min Assistance.  Step transfer with Min Assistance with DME as needed.  Pt will demonstrate understanding of education provided regarding energy  conservation and task modification through teach-back method.   Patient will increase functional independence with ADLs by performing:  Patient will demonstrate assistance as needed with HEP for R UE weight bearing.      Patient's family / caregiver will demonstrate assistance as needed and safety with assisting patient with self-care skills and functional mobility.      Patient's family / caregiver will demonstrate assistance as needed with HEP, A/AAROM and changes in bed positioning.                                Time Tracking:     OT Date of Treatment: 04/29/20  OT Start Time: 1055  OT Stop Time: 1133  OT Total Time (min): 38 min    Billable Minutes:Self Care/Home Management 30 mins  Neuromuscular Re-education 8 mins    Rocco Devine, OT  4/29/2020

## 2020-04-29 NOTE — PLAN OF CARE
Plan of care ongoing. No falls and precautions maintained. No wounds or skin breakdown present. Incontinent but patient does not understand purpose of pure wick and removes it so use pads and a brief instead to keep patient dry. Neuro checks completed. VSS and NSR on tele. COVID test pending before discharge back to [facility]. Right groin thrombectomy on 4/22 no hematoma present. Significant right sided weakness. Difficult to differentiate between language barrier and communication deficit. Patient has poor appetite when feeding assistance offered. Plan of care ongoing.   1825- COVID test came back negative

## 2020-04-30 VITALS
BODY MASS INDEX: 21.9 KG/M2 | RESPIRATION RATE: 16 BRPM | HEIGHT: 60 IN | SYSTOLIC BLOOD PRESSURE: 122 MMHG | TEMPERATURE: 98 F | OXYGEN SATURATION: 90 % | HEART RATE: 63 BPM | DIASTOLIC BLOOD PRESSURE: 58 MMHG | WEIGHT: 111.56 LBS

## 2020-04-30 PROCEDURE — 63600175 PHARM REV CODE 636 W HCPCS: Performed by: INTERNAL MEDICINE

## 2020-04-30 PROCEDURE — 25000003 PHARM REV CODE 250: Performed by: PHYSICIAN ASSISTANT

## 2020-04-30 PROCEDURE — 92526 ORAL FUNCTION THERAPY: CPT

## 2020-04-30 PROCEDURE — 99238 PR HOSPITAL DISCHARGE DAY,<30 MIN: ICD-10-PCS | Mod: ,,, | Performed by: PSYCHIATRY & NEUROLOGY

## 2020-04-30 PROCEDURE — 99238 HOSP IP/OBS DSCHRG MGMT 30/<: CPT | Mod: ,,, | Performed by: PSYCHIATRY & NEUROLOGY

## 2020-04-30 PROCEDURE — 25000003 PHARM REV CODE 250: Performed by: INTERNAL MEDICINE

## 2020-04-30 PROCEDURE — 92507 TX SP LANG VOICE COMM INDIV: CPT

## 2020-04-30 RX ADMIN — STANDARDIZED SENNA CONCENTRATE AND DOCUSATE SODIUM 1 TABLET: 8.6; 5 TABLET ORAL at 08:04

## 2020-04-30 RX ADMIN — CLOPIDOGREL BISULFATE 75 MG: 75 TABLET ORAL at 08:04

## 2020-04-30 RX ADMIN — HEPARIN SODIUM 5000 UNITS: 5000 INJECTION INTRAVENOUS; SUBCUTANEOUS at 06:04

## 2020-04-30 RX ADMIN — THERA TABS 1 TABLET: TAB at 08:04

## 2020-04-30 RX ADMIN — POLYETHYLENE GLYCOL 3350 17 G: 17 POWDER, FOR SOLUTION ORAL at 08:04

## 2020-04-30 RX ADMIN — ASPIRIN 81 MG: 81 TABLET, COATED ORAL at 08:04

## 2020-04-30 RX ADMIN — ATORVASTATIN CALCIUM 40 MG: 20 TABLET, FILM COATED ORAL at 08:04

## 2020-04-30 NOTE — PT/OT/SLP PROGRESS
"Speech Language Pathology Treatment    Patient Name:  Teresa Vaughan   MRN:  [id number]  Admitting Diagnosis: Stroke due to occlusion of left carotid artery    Recommendations:                 General Recommendations:  Dysphagia therapy, Speech/language therapy and Cognitive-linguistic therapy  Diet recommendations:  Puree, Liquid Diet Level: Nectar Thick   Aspiration Precautions: Strict aspiration precautions   General Precautions: Standard, aspiration, aphasia, fall  Communication strategies:  provide increased time to answer and     Subjective     Language line utilized,  # [id number]     Pain/Comfort:  · Pain Rating 1: 0/10  · Pain Rating Post-Intervention 1: 0/10    Objective:     Has the patient been evaluated by SLP for swallowing?   Yes  Keep patient NPO? No   Current Respiratory Status: room air      Pt alert and cooperative with increased communication attempts though jargon per .   reports verbalizations "do not even sound like Ukrainian".  Low vocal intensity with imprecise articulation observed.  "yes" response elicited only in response to y/n questions.  Simple commands followed with 50% accy given min cues.  She was able to demonstrate use of objects with 50% accy given min cues.  No response to auto speech tasks or confrontation naming.  Pt accepted puree and nectar thick liquids from breakfast tray with fairly timely a-p transit and no overt /s aspiration.  Pt tolerated soft solid trials x3 prior to expectorating solid portions following prolonged mastication.  She accepted thin liquids via cup x2 with no overt s/s aspiration prior to refusing further trials.  SLP reviewed ongoing diet recs and POC though no response by pt.       Assessment:     Teresa Vaughan is a 79 y.o. female with an SLP diagnosis of Aphasia, Dysphagia, Apraxia, Cognitive-Linguistic Impairment and Visio-Spatial Impairment.      Goals:   Multidisciplinary Problems     SLP Goals        " Problem: SLP Goal    Goal Priority Disciplines Outcome   SLP Goal     SLP Ongoing, Progressing   Description:  Speech Language Pathology Goals  Goals expected to be met by 4/30  1. Pt will tolerate puree diet with nectar thick liquids without overt s/s aspiration.   2. Pt will tolerate trials of thin liquids without overt s/s aspiration.   3. Pt will tolerate trials of solids without overt s/s aspiration.  4. Pt will answer simple y/n questions via any modality with 60% accy.  5. Pt will follow simple commands with 50% accy given max cues.   6. Pt will complete auto speech tasks with 30% accy given max cues.                            Plan:     · Patient to be seen:  4 x/week   · Plan of Care expires:  05/23/20  · Plan of Care reviewed with:  patient   · SLP Follow-Up:  Yes       Discharge recommendations:  nursing facility, skilled   Barriers to Discharge:  Level of Skilled Assistance Needed      Time Tracking:     SLP Treatment Date:   04/30/20  Speech Start Time:  0730  Speech Stop Time:  0755     Speech Total Time (min):  25 min    Billable Minutes: Speech Therapy Individual 15 and Treatment Swallowing Dysfunction 10    DARREN Box, CCC-SLP  04/30/2020

## 2020-04-30 NOTE — ASSESSMENT & PLAN NOTE
Stroke risk factor  CTA 4/25 demonstrated L ICA and bifurcation, as well as R ICA atherosclerotic plaquing with approx 70% stenosis  R ICA stenosis currently appears asymptomatic.  Placed Ambulatory referral to Vascular Surgery at discharge, though pt would need to recover more before determining surgical candidacy.   DAPT, statin   I have personally seen and examined this patient.  I have fully participated in the care of this patient. I have reviewed all pertinent clinical information, including history, physical exam, plan and the Resident’s note and agree except as noted.

## 2020-04-30 NOTE — PROGRESS NOTES
[facility]  Vascular Neurology  Comprehensive Stroke Center  Progress Note    Assessment/Plan:     * Stroke due to occlusion of left carotid artery  Teresa Vaughan is a 79 y.o. female with PMHx of HTN and HLD who presented to OSH with R-sided weakness and aphasia. She was seen via telemedicine and not eligible for tPA due to timeline. CTA obtained at OSH with L ICA occlusion and patient transferred to [facility] for possible IR intervention. CT head on arrival to [facility] with ischemic changes in L insula. Patient went to IR for angioplasty and aspiration thrombectomy with subsequent TICI 2A reperfusion. MRI Brain demonstrated acute infarcts involving L MCA, L PCA, and MAICO cerebellum. TTE showed mild L atrial enlargement; otherwise findings WNL. Of note, patient tested negative for COVID-19.    Repeat CTA Head/Neck 4/25 showed re-occlusion at the L carotid bifurcation as well as small degree of petechial hemorrhage. Stroke etiology suspected ESUS; plans for 30-day cardiac event monitoring following SNF discharge.    Neuro exam remains stable. CM/SW continuing efforts toward SNF placement.       Antithrombotics for secondary stroke prevention: Antiplatelets: Aspirin: 81 mg daily, Plavix 75mg Daily  Statins for secondary stroke prevention and hyperlipidemia, if present:   Statins: Atorvastatin- 40 mg daily  Aggressive risk factor modification: HTN, HLD, R ICA stenosis  Rehab efforts: The patient has been evaluated by a stroke team provider and the therapy needs have been fully considered based off the presenting complaints and exam findings. The following therapy evaluations are needed: PT evaluate and treat, OT evaluate and treat, SLP evaluate and treat - Dispo SNF (pt was living alone previously)  Diagnostics ordered/pending: none  VTE prophylaxis: Heparin 5000 units SQ every 8 hours, place SCDs  BP parameters: Infarct, Carotid stenosis: SBP <160    Stenosis of right internal carotid artery  Stroke risk  factor  CTA 4/25 demonstrated L ICA and bifurcation, as well as R ICA atherosclerotic plaquing with approx 70% stenosis  R ICA stenosis currently appears asymptomatic.  Placed Ambulatory referral to Vascular Surgery at discharge, though pt would need to recover more before determining surgical candidacy.   DAPT, statin    Dysphagia  2/2 stroke - also in the setting of severe aphasia  Diet had been upgraded to mechanical soft on 4/27 however per SLP 4/28 AM, pt not tolerating well; changed diet back to puree with nectar-thick liquids.   Continue aggressive SLP    Cytotoxic cerebral edema  Upon review of brain imaging, moderate area of cytotoxic cerebral edema identified in the territory of the Left middle cerebral artery. There is associated mass effect.   We will continue to monitor the patients clinical exam for any worsening of symptoms which may indicate expansion of the stroke or the area of edema resulting in such clinical change.   Pattern is suggestive of an embolic etiology.    Mixed hyperlipidemia  Stroke risk factor    Home medications unknown  Atorvastatin 40 mg daily    Essential hypertension  Stroke risk factor  SBP < 160 in the setting of MAICO ICA atherosclerosis  Home meds unknown though pt's BP at goal over last 24 hrs without use of any anti-hypertensives.  Will continue monitoring         Teresa Vaughan is a 79 y.o. female with PMHx of HTN and HLD who presented to [facility] with aphasia and RSW. Patient went to ED after being found outside wandering around by her neighbors. She was seen via telemedicine and was not a candidate for tPA . CTA at OSH revealed L ICA occlusion. Patient transferred to [facility] for possible IR intervention. Angioplasty and Aspiration thrombectomy completed at that time. Patient primarily [place] speaking and aphasic.     4/25: Patient stepped down to stroke primary service. CTA head and neck complete- L ICA re occluded but reconstitutes distally. Neuro  exam unchanged.   4/26: Neuro exam stable. Stroke etiology likely ESUS. Patient to discharge with 30 day event monitor.  4/27: Patient remains stable; monitoring BP. Pending SNF placement. Labs qOD.  4/28: Diet had been upgraded yesterday however per SLP this AM, pt not tolerating well; changed diet back to puree with nectar-thick liquids. Neuro exam remains stable. CM/SW continuing efforts toward placement.   4/29: arranging placement at SNF, patient medically stable for discharge  4/30: NAEON. Patient to discharge to SNF today.     STROKE DOCUMENTATION   Acute Stroke Times   Last Known Normal Date: 04/21/20  Last Known Normal Time: 1800  Symptom Onset Date: (unknown)  Symptom Onset Time: (unknown)  Stroke Team Called Date: 04/22/20  Stroke Team Called Time: 1415  Stroke Team Arrival Date: 04/22/20  Stroke Team Arrival Time: 1415  CT Interpretation Time: 1425  Decision to Treat Time for Alteplase: (decision made at OSH via telemedicine)  Decision to Treat Time for IR: 1425    NIH Scale:  1a. Level of Consciousness: 0-->Alert, keenly responsive  1b. LOC Questions: 2-->Answers neither question correctly  1c. LOC Commands: 1-->Performs one task correctly  2. Best Gaze: 1-->Partial gaze palsy, gaze is abnormal in one or both eyes, but forced deviation or total gaze paresis is not present  3. Visual: 2-->Complete hemianopia  4. Facial Palsy: 2-->Partial paralysis (total or near-total paralysis of lower face)  5a. Motor Arm, Left: 0-->No drift, limb holds 90 (or 45) degrees for full 10 secs  5b. Motor Arm, Right: 4-->No movement  6a. Motor Leg, Left: 0-->No drift, leg holds 30 degree position for full 5 secs  6b. Motor Leg, Right: 4-->No movement  7. Limb Ataxia: 0-->Absent  8. Sensory: 0-->Normal, no sensory loss  9. Best Language: 3-->Mute, global aphasia, no usable speech or auditory comprehension  10. Dysarthria: 2-->Severe dysarthria, patients speech is so slurred as to be unintelligible in the absence of or out of  proportion to any dysphasia, or is mute/anarthric  11. Extinction and Inattention (formerly Neglect): 0-->No abnormality  Total (NIH Stroke Scale): 21       Modified [place]    Jennifer Coma Scale:    ABCD2 Score:    [id number] Score:   HAS -BLED Score:   ICH Score:   Hunt & Dahl Classification:      Hemorrhagic change of an Ischemic Stroke: Does this patient have an ischemic stroke with hemorrhagic changes? No     Neurologic Chief Complaint: Right-sided weakness    Subjective:     Interval History: Patient is seen for follow-up neurological assessment and treatment recommendations:     VIJAYON. Patient to discharge to SNF today.     HPI, Past Medical, Family, and Social History remains the same as documented in the initial encounter.     Review of Systems   Constitutional: Negative for chills, fatigue and fever.   Eyes: Positive for visual disturbance. Negative for discharge.   Respiratory: Negative for cough.    Gastrointestinal: Negative for vomiting.   Neurological: Positive for facial asymmetry, speech difficulty and weakness.     Scheduled Meds:   aspirin  81 mg Oral Daily    atorvastatin  40 mg Oral Daily    clopidogreL  75 mg Oral Daily    heparin (porcine)  5,000 Units Subcutaneous Q8H    multivitamin  1 tablet Oral Daily    polyethylene glycol  17 g Oral Daily    senna-docusate 8.6-50 mg  1 tablet Oral BID     Continuous Infusions:  PRN Meds:acetaminophen, ondansetron    Objective:     Vital Signs (Most Recent):  Temp: 97.8 °F (36.6 °C) (04/30/20 0756)  Pulse: 63 (04/30/20 1000)  Resp: 16 (04/30/20 0756)  BP: (!) 122/58 (04/30/20 0756)  SpO2: (!) 90 % (04/30/20 0756)  BP Location: Left arm    Vital Signs Range (Last 24H):  Temp:  [97.8 °F (36.6 °C)-99.7 °F (37.6 °C)]   Pulse:  [63-81]   Resp:  [16-18]   BP: (122-140)/(58-66)   SpO2:  [90 %-99 %]   BP Location: Left arm    Physical Exam   Constitutional: She appears well-developed and well-nourished. No distress.   HENT:   Head: Normocephalic and  atraumatic.   Cardiovascular: Normal rate.   Pulmonary/Chest: Effort normal. No respiratory distress.   Neurological: She is alert.   Skin: Skin is warm and dry.   Psychiatric: Cognition and memory are impaired. She is attentive.   Vitals reviewed.      Neurological Exam:   LOC: alert  Attention Span: Good   Language: Global aphasia  Articulation: Dysarthria  Orientation: Untestable due to severe aphasia   Visual Fields: Hemianopsia right  EOM (CN III, IV, VI): Gaze preference  left and but able to cross midline  Facial Movement (CN VII): Lower facial weakness on the Right  Motor: Arm left  Paresis: 4/5  Leg left  Paresis: 4/5  Arm right  Plegia 0/5  Leg right Plegia 0/5  Tone: Flaccid  RUE and RLE     Laboratory:  CMP:   No results for input(s): GLUCOSE, CALCIUM, ALBUMIN, PROT, NA, K, CO2, CL, BUN, CREATININE, ALKPHOS, ALT, AST, BILITOT in the last 24 hours.  BMP:   Recent Labs   Lab 04/29/20  0633      K 3.7      CO2 25   BUN 17   CREATININE 0.7   CALCIUM 8.8     CBC:   Recent Labs   Lab 04/29/20  0633   WBC 7.32   RBC 3.48*   HGB 10.0*   HCT 31.6*      MCV 91   MCH 28.7   MCHC 31.6*     Lipid Panel:   No results for input(s): CHOL, LDLCALC, HDL, TRIG in the last 168 hours.  Coagulation: No results for input(s): PT, INR, APTT in the last 168 hours.  Platelet Aggregation Study: No results for input(s): PLTAGG, PLTAGINTERP, PLTAGREGLACO, ADPPLTAGGREG in the last 168 hours.  Hgb A1C:   No results for input(s): HGBA1C in the last 168 hours.  TSH:   No results for input(s): TSH in the last 168 hours.    Diagnostic Results     Brain imaging:     MRI Brain w/o contrast 4/25/20    Prominent areas of acute ischemia/infarct involving the left cerebral hemisphere as detailed above with additional focal areas involving the left cerebellum and a focus of the right cerebellum.  Focal area of signal hypointensity of the left thalamus may relate to an area of focal hemorrhagic conversion a large degree of  hemorrhagic conversion is not appreciated.  Signal abnormality of the left internal carotid artery likely relates to diminished flow or potentially lack of flow, correlation with the recent angiographic procedure is recommended.     CT Head. Date: 04/22/20  Ill-defined region of decreased attenuation left insula anteriorly concerning for acute/recent infarction.  No significant mass effect or evidence for hemorrhagic conversion.       Vessel Imaging:    CTA Head/Neck 4/25/20  CT head: Evolving moderate to large regions of left MCA and PCA territory infarction as seen on prior MRI.  There is increased mass effect and edema particularly in left parietal and occipital components with localized mass effect without significant midline shift.  Intermediate density within the left temporal component concerning for component of petechial hemorrhage.  No evidence for extra-axial hemorrhage or hydrocephalus.  CTA head and neck: Continued occlusion left carotid artery and carotid bifurcation with reconstituted left distal cavernous ICA which may be retrograde or flow via the ophthalmic artery.  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  Taper configuration left distal P2 segment of the PCA concerning for stenosis and distal inclusion.  Atherosclerotic plaquing right carotid bifurcation proximal ICA with approximately 70 percent right proximal ICA stenosis by NASCET criteria.  Degenerative changes cervical spine with mild superior endplate C3 vertebral body deformity concerning for fracture overall age indeterminate and likely remote.    Cerebral Angiogram 4/22/20  1.  Occluded left internal carotid artery at the origin treated by balloon angioplasty to less than 30% stenosis.  No embolization protection device used because distal ICA was occluded.  2.  Aspiration thrombectomy performed at left ICA terminus with TICI 2A reperfusion.    CTA  OS. Date: 04/22/20  L ICA occlusion        Cardiac imaging     TTE 4/23/20  · Normal left ventricular systolic function. The estimated ejection fraction is 70%.  · No wall motion abnormalities.  · Indeterminate left ventricular diastolic function.  · Normal right ventricular systolic function.  · The estimated PA systolic pressure is 32 mmHg.  · Normal central venous pressure (3 mmHg).  · Mild left atrial enlargement.      Vashti Haynes NP  [facility]  Department of Vascular Neurology   [facility]

## 2020-04-30 NOTE — PLAN OF CARE
04/30/20 0952   Post-Acute Status   Post-Acute Authorization Placement   Post-Acute Placement Status Set-up Complete     DANA sent over COVID test results to [facility] via .    DANA spoke with Lisa and was informed that pt can be scheduled for transfer. Pt will be assigned to room 301 and KEMAL Nolasco can call report to [phone], ask for the [place] Nurse.    DANA arranged stretcher transport via [facility]. Requested  time is 10:45am. Requested  time does not guarantee arrival time.    Tamy Cerna LMSW  [facility]  Ext. [phone]

## 2020-04-30 NOTE — SUBJECTIVE & OBJECTIVE
Neurologic Chief Complaint: Right-sided weakness    Subjective:     Interval History: Patient is seen for follow-up neurological assessment and treatment recommendations:     CARLOS EDUARDO. Patient to discharge to SNF today.     HPI, Past Medical, Family, and Social History remains the same as documented in the initial encounter.     Review of Systems   Constitutional: Negative for chills, fatigue and fever.   Eyes: Positive for visual disturbance. Negative for discharge.   Respiratory: Negative for cough.    Gastrointestinal: Negative for vomiting.   Neurological: Positive for facial asymmetry, speech difficulty and weakness.     Scheduled Meds:   aspirin  81 mg Oral Daily    atorvastatin  40 mg Oral Daily    clopidogreL  75 mg Oral Daily    heparin (porcine)  5,000 Units Subcutaneous Q8H    multivitamin  1 tablet Oral Daily    polyethylene glycol  17 g Oral Daily    senna-docusate 8.6-50 mg  1 tablet Oral BID     Continuous Infusions:  PRN Meds:acetaminophen, ondansetron    Objective:     Vital Signs (Most Recent):  Temp: 97.8 °F (36.6 °C) (04/30/20 0756)  Pulse: 63 (04/30/20 1000)  Resp: 16 (04/30/20 0756)  BP: (!) 122/58 (04/30/20 0756)  SpO2: (!) 90 % (04/30/20 0756)  BP Location: Left arm    Vital Signs Range (Last 24H):  Temp:  [97.8 °F (36.6 °C)-99.7 °F (37.6 °C)]   Pulse:  [63-81]   Resp:  [16-18]   BP: (122-140)/(58-66)   SpO2:  [90 %-99 %]   BP Location: Left arm    Physical Exam   Constitutional: She appears well-developed and well-nourished. No distress.   HENT:   Head: Normocephalic and atraumatic.   Cardiovascular: Normal rate.   Pulmonary/Chest: Effort normal. No respiratory distress.   Neurological: She is alert.   Skin: Skin is warm and dry.   Psychiatric: Cognition and memory are impaired. She is attentive.   Vitals reviewed.      Neurological Exam:   LOC: alert  Attention Span: Good   Language: Global aphasia  Articulation: Dysarthria  Orientation: Untestable due to severe aphasia   Visual Fields:  Hemianopsia right  EOM (CN III, IV, VI): Gaze preference  left and but able to cross midline  Facial Movement (CN VII): Lower facial weakness on the Right  Motor: Arm left  Paresis: 4/5  Leg left  Paresis: 4/5  Arm right  Plegia 0/5  Leg right Plegia 0/5  Tone: Flaccid  RUE and RLE     Laboratory:  CMP:   No results for input(s): GLUCOSE, CALCIUM, ALBUMIN, PROT, NA, K, CO2, CL, BUN, CREATININE, ALKPHOS, ALT, AST, BILITOT in the last 24 hours.  BMP:   Recent Labs   Lab 04/29/20  0633      K 3.7      CO2 25   BUN 17   CREATININE 0.7   CALCIUM 8.8     CBC:   Recent Labs   Lab 04/29/20  0633   WBC 7.32   RBC 3.48*   HGB 10.0*   HCT 31.6*      MCV 91   MCH 28.7   MCHC 31.6*     Lipid Panel:   No results for input(s): CHOL, LDLCALC, HDL, TRIG in the last 168 hours.  Coagulation: No results for input(s): PT, INR, APTT in the last 168 hours.  Platelet Aggregation Study: No results for input(s): PLTAGG, PLTAGINTERP, PLTAGREGLACO, ADPPLTAGGREG in the last 168 hours.  Hgb A1C:   No results for input(s): HGBA1C in the last 168 hours.  TSH:   No results for input(s): TSH in the last 168 hours.    Diagnostic Results     Brain imaging:     MRI Brain w/o contrast 4/25/20    Prominent areas of acute ischemia/infarct involving the left cerebral hemisphere as detailed above with additional focal areas involving the left cerebellum and a focus of the right cerebellum.  Focal area of signal hypointensity of the left thalamus may relate to an area of focal hemorrhagic conversion a large degree of hemorrhagic conversion is not appreciated.  Signal abnormality of the left internal carotid artery likely relates to diminished flow or potentially lack of flow, correlation with the recent angiographic procedure is recommended.     CT Head. Date: 04/22/20  Ill-defined region of decreased attenuation left insula anteriorly concerning for acute/recent infarction.  No significant mass effect or evidence for hemorrhagic  conversion.       Vessel Imaging:    CTA Head/Neck 4/25/20  CT head: Evolving moderate to large regions of left MCA and PCA territory infarction as seen on prior MRI.  There is increased mass effect and edema particularly in left parietal and occipital components with localized mass effect without significant midline shift.  Intermediate density within the left temporal component concerning for component of petechial hemorrhage.  No evidence for extra-axial hemorrhage or hydrocephalus.  CTA head and neck: Continued occlusion left carotid artery and carotid bifurcation with reconstituted left distal cavernous ICA which may be retrograde or flow via the ophthalmic artery.  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  Taper configuration left distal P2 segment of the PCA concerning for stenosis and distal inclusion.  Atherosclerotic plaquing right carotid bifurcation proximal ICA with approximately 70 percent right proximal ICA stenosis by NASCET criteria.  Degenerative changes cervical spine with mild superior endplate C3 vertebral body deformity concerning for fracture overall age indeterminate and likely remote.    Cerebral Angiogram 4/22/20  1.  Occluded left internal carotid artery at the origin treated by balloon angioplasty to less than 30% stenosis.  No embolization protection device used because distal ICA was occluded.  2.  Aspiration thrombectomy performed at left ICA terminus with TICI 2A reperfusion.    CTA OSH. Date: 04/22/20  L ICA occlusion        Cardiac imaging     TTE 4/23/20  · Normal left ventricular systolic function. The estimated ejection fraction is 70%.  · No wall motion abnormalities.  · Indeterminate left ventricular diastolic function.  · Normal right ventricular systolic function.  · The estimated PA systolic pressure is 32 mmHg.  · Normal central venous pressure (3 mmHg).  · Mild left atrial  enlargement.

## 2020-04-30 NOTE — PLAN OF CARE
04/30/20 0948   Final Note   Assessment Type Final Discharge Note   Anticipated Discharge Disposition SNF     Pt scheduled for d/c today. Spoke with Lisa at [facility] in [place]. Pt accepted. Pt's nurse notified of discharge orders. Report will be called  to nurse at [facility] ([phone] room [id number]).     CM spoke with DANA to arrange transportation. CM will continue to follow up.    Kori Richardson NP    No Pharmacies Listed     Trent Mak RN Case Manager

## 2020-04-30 NOTE — DISCHARGE SUMMARY
[facility]  Vascular Neurology  Comprehensive Stroke Center  Discharge Summary     Summary:     Admit Date: 4/22/2020  2:21 PM    Discharge Date and Time:  04/30/2020 12:10 PM    Attending Physician: Kvng Decker DO     Discharge Provider: Vashti Haynes NP    History of Present Illness: Teresa Vaughan is a 79 y.o. female with PMHx of HTN and HLD who presented to [facility] with aphasia and RSW. Patient went to ED after being found outside wandering around by her neighbors. She was seen via telemedicine and was not a candidate for tPA (last known normal yesterday). CTA at OSH revealed L ICA occlusion. Patient transferred [place] [facility] for possible IR intervention. Patient primarily Indonesian speaking and aphasic. Therefore, history obtained via chart review.     Hospital Course (synopsis of major diagnoses, care, treatment, and services provided during the course of the hospital stay): Teresa Vaughan is a 79 y.o. female with PMHx of HTN and HLD who presented to [facility] with aphasia and RSW. Patient went to ED after being found outside wandering around by her neighbors. She was seen via telemedicine and was not a candidate for tPA . CTA at OSH revealed L ICA occlusion. Patient transferred to [facility] for possible IR intervention. Angioplasty and Aspiration thrombectomy completed at that time. CTA head and neck was repeated 4/25 demonstrating re-occlusion at the L carotid bifurcation as well as small degree of petechial hemorrhage. Etiology suspected to be ESUS. A cardiac event monitor was ordered following SNF discharged. Her R ICA was noted to have approx. 70% stenosis. Ambulatory referral to Vascular Surgery was placed at discharge. Patient was started on DAPT + Atorvastatin 40 mg for secondary stroke prevention. Therapy teams recommended SNF. For detailed hospital course please see below.     4/25: Patient stepped down to stroke primary service. CTA head and neck  complete- L ICA re occluded but reconstitutes distally. Neuro exam unchanged.   4/26: Neuro exam stable. Stroke etiology likely ESUS. Patient to discharge with 30 day event monitor.  4/27: Patient remains stable; monitoring BP. Pending SNF placement. Labs qOD.  4/28: Diet had been upgraded yesterday however per SLP this AM, pt not tolerating well; changed diet back to puree with nectar-thick liquids. Neuro exam remains stable. CM/SW continuing efforts toward placement.   4/29: arranging placement at SNF, patient medically stable for discharge  4/30: NAEON. Patient to discharge to SNF today.     Stroke Etiology: Undetermined Cause. Cryptogenic Embolism / ESUS (Embolic Stroke of Undetermined Source)    STROKE DOCUMENTATION   Acute Stroke Times   Last Known Normal Date: 04/21/20  Last Known Normal Time: 1800  Symptom Onset Date: (unknown)  Symptom Onset Time: (unknown)  Stroke Team Called Date: 04/22/20  Stroke Team Called Time: 1415  Stroke Team Arrival Date: 04/22/20  Stroke Team Arrival Time: 1415  CT Interpretation Time: 1425  Decision to Treat Time for Alteplase: (decision made at OSH via telemedicine)  Decision to Treat Time for IR: 1425     NIH Scale:  Interval: 7 days or at discharge (whichever comes first)  1a. Level of Consciousness: 0-->Alert, keenly responsive  1b. LOC Questions: 2-->Answers neither question correctly  1c. LOC Commands: 1-->Performs one task correctly  2. Best Gaze: 1-->Partial gaze palsy, gaze is abnormal in one or both eyes, but forced deviation or total gaze paresis is not present  3. Visual: 2-->Complete hemianopia  4. Facial Palsy: 2-->Partial paralysis (total or near-total paralysis of lower face)  5a. Motor Arm, Left: 0-->No drift, limb holds 90 (or 45) degrees for full 10 secs  5b. Motor Arm, Right: 4-->No movement  6a. Motor Leg, Left: 0-->No drift, leg holds 30 degree position for full 5 secs  6b. Motor Leg, Right: 4-->No movement  7. Limb Ataxia: 0-->Absent  8. Sensory:  0-->Normal, no sensory loss  9. Best Language: 3-->Mute, global aphasia, no usable speech or auditory comprehension  10. Dysarthria: 2-->Severe dysarthria, patients speech is so slurred as to be unintelligible in the absence of or out of proportion to any dysphasia, or is mute/anarthric  11. Extinction and Inattention (formerly Neglect): 0-->No abnormality  Total (NIH Stroke Scale): 21        Modified [place] Score: 5  Jennifer Coma Scale:    ABCD2 Score:    [id number] Score:   HAS -BLED Score:   ICH Score:   Hunt & Dahl Classification:       Assessment/Plan:     Diagnostic Results:    Brain imaging:     MRI Brain w/o contrast 4/25/20    Prominent areas of acute ischemia/infarct involving the left cerebral hemisphere as detailed above with additional focal areas involving the left cerebellum and a focus of the right cerebellum.  Focal area of signal hypointensity of the left thalamus may relate to an area of focal hemorrhagic conversion a large degree of hemorrhagic conversion is not appreciated.  Signal abnormality of the left internal carotid artery likely relates to diminished flow or potentially lack of flow, correlation with the recent angiographic procedure is recommended.     CT Head. Date: 04/22/20  Ill-defined region of decreased attenuation left insula anteriorly concerning for acute/recent infarction.  No significant mass effect or evidence for hemorrhagic conversion.        Vessel Imaging:     CTA Head/Neck 4/25/20  CT head: Evolving moderate to large regions of left MCA and PCA territory infarction as seen on prior MRI.  There is increased mass effect and edema particularly in left parietal and occipital components with localized mass effect without significant midline shift.  Intermediate density within the left temporal component concerning for component of petechial hemorrhage.  No evidence for extra-axial hemorrhage or hydrocephalus.  CTA head and neck: Continued occlusion left carotid artery and  carotid bifurcation with reconstituted left distal cavernous ICA which may be retrograde or flow via the ophthalmic artery.  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  Taper configuration left distal P2 segment of the PCA concerning for stenosis and distal inclusion.  Atherosclerotic plaquing right carotid bifurcation proximal ICA with approximately 70 percent right proximal ICA stenosis by NASCET criteria.  Degenerative changes cervical spine with mild superior endplate C3 vertebral body deformity concerning for fracture overall age indeterminate and likely remote.     Cerebral Angiogram 4/22/20  1.  Occluded left internal carotid artery at the origin treated by balloon angioplasty to less than 30% stenosis.  No embolization protection device used because distal ICA was occluded.  2.  Aspiration thrombectomy performed at left ICA terminus with TICI 2A reperfusion.     CTA OSH. Date: 04/22/20  L ICA occlusion        Cardiac imaging     TTE 4/23/20  · Normal left ventricular systolic function. The estimated ejection fraction is 70%.  · No wall motion abnormalities.  · Indeterminate left ventricular diastolic function.  · Normal right ventricular systolic function.  · The estimated PA systolic pressure is 32 mmHg.  · Normal central venous pressure (3 mmHg).  · Mild left atrial enlargement.    Interventions: Thrombectomy    Complications: None    Disposition: Skilled Nursing Facility    Final Active Diagnoses:    Diagnosis Date Noted POA    PRINCIPAL PROBLEM:  Stroke due to occlusion of left carotid artery [I63.232] 04/22/2020 Yes    Dysphagia [R13.10] 04/27/2020 Yes    Stenosis of right internal carotid artery [I65.21] 04/27/2020 Yes    Essential hypertension [I10] 04/22/2020 Yes    Mixed hyperlipidemia [E78.2] 04/22/2020 Yes    Cytotoxic cerebral edema [G93.6] 04/22/2020 Yes      Problems Resolved During this Admission:      * Stroke due to occlusion of left carotid artery  Teresa Vaughan is a 79 y.o. female with PMHx of HTN and HLD who presented to OSH with R-sided weakness and aphasia. She was seen via telemedicine and not eligible for tPA due to timeline. CTA obtained at OSH with L ICA occlusion and patient transferred to [facility] [place] possible IR intervention. CT head on arrival to C with ischemic changes in L insula. Patient went to IR for angioplasty and aspiration thrombectomy with subsequent TICI 2A reperfusion. MRI Brain demonstrated acute infarcts involving L MCA, L PCA, and MAICO cerebellum. TTE showed mild L atrial enlargement; otherwise findings WNL. Of note, patient tested negative for COVID-19.    Repeat CTA Head/Neck 4/25 showed re-occlusion at the L carotid bifurcation as well as small degree of petechial hemorrhage. Stroke etiology suspected ESUS; 30 day cardiac event monitor ordered at discharge. Patient discharged to SNF.    Antithrombotics for secondary stroke prevention: Antiplatelets: Aspirin: 81 mg daily, Plavix 75mg Daily  Statins for secondary stroke prevention and hyperlipidemia, if present:   Statins: Atorvastatin- 40 mg daily  Aggressive risk factor modification: HTN, HLD, R ICA stenosis  Rehab efforts: The patient has been evaluated by a stroke team provider and the therapy needs have been fully considered based off the presenting complaints and exam findings. The following therapy evaluations are needed: PT evaluate and treat, OT evaluate and treat, SLP evaluate and treat - Dispo SNF (pt was living alone previously)  Diagnostics ordered/pending: none  VTE prophylaxis: Heparin 5000 units SQ every 8 hours, place SCDs  BP parameters: Infarct, Carotid stenosis: SBP <160    Stenosis of right internal carotid artery  Stroke risk factor  CTA 4/25 demonstrated L ICA and bifurcation, as well as R ICA atherosclerotic plaquing with approx 70% stenosis  R ICA stenosis currently appears asymptomatic.  Placed Ambulatory  referral to Vascular Surgery at discharge, though pt would need to recover more before determining surgical candidacy.   DAPT, statin    Dysphagia  2/2 stroke - also in the setting of severe aphasia  Diet had been upgraded to mechanical soft on 4/27 however per SLP 4/28 AM, pt not tolerating well; changed diet back to puree with nectar-thick liquids.   Continue aggressive SLP    Cytotoxic cerebral edema  Upon review of brain imaging, moderate area of cytotoxic cerebral edema identified in the territory of the Left middle cerebral artery. There is associated mass effect.   We will continue to monitor the patients clinical exam for any worsening of symptoms which may indicate expansion of the stroke or the area of edema resulting in such clinical change.   Pattern is suggestive of an embolic etiology.    Mixed hyperlipidemia  Stroke risk factor    Home medications unknown  Atorvastatin 40 mg daily    Essential hypertension  Stroke risk factor  SBP < 160 in the setting of MAICO ICA atherosclerosis  Home meds unknown though pt's BP at goal over last 24 hrs without use of any anti-hypertensives.  Will continue monitoring        Recommendations:     Post-discharge complication risks: Falls, Skin breakdown    Stroke Education given to: patient and friend over telephone     Follow-up in Stroke Clinic in 4-6 weeks     Discharge Plan:  Antithrombotics: Aspirin 81mg, Clopidogrel 75mg  Statin: Atorvastatin 40mg  Aggresive risk factor modification:  Hypertension  High Cholesterol  Diet  Exercise    Follow Up:  Follow-up Information     Kori Richardson NP In 1 week.    Specialty:  Nurse Practitioner  Why:  Call your PCP to set up hospital follow-up appt within 1 week of discharge.  Contact information:  [address] [address] [phone]             [facility] VASCULAR NEUROLOGY In 6 weeks.    Specialty:  Vascular Neurology  Why:  Someone will call you to set up hospital follow-up in 4-8 weeks. If nobody  calls within 1 week, call number above to schedule an appt.  Contact information:  Giuliana Garza  [facility] [id number] [phone]           Leonel Garza - Vascular Surgery.    Specialty:  Vascular Surgery  Why:  Someone will contact you to schedule your appointment with vascular surgery. Please contact phone number listed if you do not receive a phone call within 1 week  Contact information:  Giuliana Garza  [facility] [address] [phone]  Additional information:  [facility] [place]                 Patient Instructions:      Ambulatory referral/consult to Vascular Surgery   Standing Status: Future   Referral Priority: Routine Referral Type: Consultation   Referral Reason: Specialty Services Required   Requested Specialty: Vascular Surgery   Number of Visits Requested: 1       Medications:  Reconciled Home Medications:      Medication List      START taking these medications    aspirin 81 MG EC tablet  Commonly known as:  ECOTRIN  Take 1 tablet (81 mg total) by mouth once daily.     atorvastatin 40 MG tablet  Commonly known as:  LIPITOR  Take 1 tablet (40 mg total) by mouth once daily.     clopidogreL 75 mg tablet  Commonly known as:  PLAVIX  Take 1 tablet (75 mg total) by mouth once daily.     multivitamin capsule  Take 1 capsule by mouth once daily.     polyethylene glycol 17 gram Pwpk  Commonly known as:  GLYCOLAX  Take 17 g by mouth once daily.     senna-docusate 8.6-50 mg 8.6-50 mg per tablet  Commonly known as:  PERICOLACE  Take 1 tablet by mouth 2 (two) times daily.            Vashti Haynes NP  [facility]  Department of Vascular Neurology   [facility]

## 2020-04-30 NOTE — PLAN OF CARE
Patient was alert and nonverbal to nursing care. Tunisian Speaking. Mubbles you have really listen to what she says when she talks to you.   Vital signs WNL . No c/o of pain or discomfort. No cardiopulmonary distress.   IV patent and saline locked. Right sided weakness.  Total care.  Fall precaution maintained. Fluids encouraged. Call light/bedside table within reach.  Will continue to monitor patient for change of condition.

## 2020-04-30 NOTE — ASSESSMENT & PLAN NOTE
Teresa Vaughan is a 79 y.o. female with PMHx of HTN and HLD who presented to OSH with R-sided weakness and aphasia. She was seen via telemedicine and not eligible for tPA due to timeline. CTA obtained at OSH with L ICA occlusion and patient transferred to [facility] [place] possible IR intervention. CT head on arrival to [facility] with ischemic changes in L insula. Patient went to IR for angioplasty and aspiration thrombectomy with subsequent TICI 2A reperfusion. MRI Brain demonstrated acute infarcts involving L MCA, L PCA, and MAICO cerebellum. TTE showed mild L atrial enlargement; otherwise findings WNL. Of note, patient tested negative for COVID-19.    Repeat CTA Head/Neck 4/25 showed re-occlusion at the L carotid bifurcation as well as small degree of petechial hemorrhage. Stroke etiology suspected ESUS; plans for 30-day cardiac event monitoring following SNF discharge.    Neuro exam remains stable. CM/SW continuing efforts toward SNF placement.       Antithrombotics for secondary stroke prevention: Antiplatelets: Aspirin: 81 mg daily, Plavix 75mg Daily  Statins for secondary stroke prevention and hyperlipidemia, if present:   Statins: Atorvastatin- 40 mg daily  Aggressive risk factor modification: HTN, HLD, R ICA stenosis  Rehab efforts: The patient has been evaluated by a stroke team provider and the therapy needs have been fully considered based off the presenting complaints and exam findings. The following therapy evaluations are needed: PT evaluate and treat, OT evaluate and treat, SLP evaluate and treat - Dispo SNF (pt was living alone previously)  Diagnostics ordered/pending: none  VTE prophylaxis: Heparin 5000 units SQ every 8 hours, place SCDs  BP parameters: Infarct, Carotid stenosis: SBP <160

## 2020-04-30 NOTE — ASSESSMENT & PLAN NOTE
Stroke risk factor  CTA 4/25 demonstrated L ICA and bifurcation, as well as R ICA atherosclerotic plaquing with approx 70% stenosis  R ICA stenosis currently appears asymptomatic.  Placed Ambulatory referral to Vascular Surgery at discharge, though pt would need to recover more before determining surgical candidacy.   DAPT, statin

## 2020-04-30 NOTE — PLAN OF CARE
Patient discharged to skilled facility transferred on stretcher by abdelrahman. PIV removed and tele removed. Waffle mat transferred with patient and pump. Patient had no concerns and was calm and cooperative. She was clean and dry.

## 2020-05-07 ENCOUNTER — TELEPHONE (OUTPATIENT)
Dept: VASCULAR SURGERY | Facility: CLINIC | Age: 80
End: 2020-05-07

## 2020-05-07 DIAGNOSIS — I63.232 ACUTE ISCHEMIC LEFT INTERNAL CAROTID ARTERY (ICA) STROKE: Primary | ICD-10-CM

## 2020-05-07 NOTE — TELEPHONE ENCOUNTER
----- Message from Federico Garcia sent at 5/7/2020  9:12 AM CDT -----  Contact: Key Aguilera with [facility]       The caller states that the Pt has a referral in the system from Dr. Marroquin to be seen by vascular within three week of leaving the hospital.  Please contact the Pt to schedule.    Phone # [phone] (please ask for Key/the caller)

## 2020-05-21 ENCOUNTER — TELEPHONE (OUTPATIENT)
Dept: VASCULAR SURGERY | Facility: CLINIC | Age: 80
End: 2020-05-21

## 2020-05-21 NOTE — TELEPHONE ENCOUNTER
----- Message from Sai Valentino sent at 5/21/2020  9:52 AM CDT -----  Contact: Key(Counts include 234 beds at the [facility])  Called to speak w/ someone regarding rescheduling the patient for a virtual visit, requesting callback     Callback: [phone]

## 2020-05-21 NOTE — TELEPHONE ENCOUNTER
No virtual visit avaliable for Dr. hCristianson at this time . Communicated this information with Key at rehab will see if we can refer pt to someone closer to her region.

## 2020-06-17 ENCOUNTER — TELEPHONE (OUTPATIENT)
Dept: VASCULAR SURGERY | Facility: CLINIC | Age: 80
End: 2020-06-17

## 2020-06-17 NOTE — TELEPHONE ENCOUNTER
----- Message from Cate Bruno sent at 6/17/2020 10:53 AM CDT -----  Regarding: Appt  Shawn Yoder is calling to reschedule the pt's appts from 6/4/20 for a [facility]OS[facility] as a NP.      She can be reached at [phone].    Thank you.

## 2020-06-17 NOTE — TELEPHONE ENCOUNTER
I explained to Mimi that we can not see first time pt for vitrual visit. This pt needs to come in to the office for a carotid eval. She explained to me that their are not bringing pt to [place] right now. She will see if the pt can get the ultrasound out there.

## 2020-06-26 ENCOUNTER — TELEPHONE (OUTPATIENT)
Dept: VASCULAR SURGERY | Facility: CLINIC | Age: 80
End: 2020-06-26

## 2020-06-26 NOTE — TELEPHONE ENCOUNTER
----- Message from Jessica Parham sent at 6/26/2020 10:31 AM CDT -----  [facility] behalf of [facility] pt and is asking to speak with diane the nurse and would like for diane to give her a call back at [phone]

## 2020-08-20 ENCOUNTER — TELEPHONE (OUTPATIENT)
Dept: NEUROLOGY | Facility: CLINIC | Age: 80
End: 2020-08-20

## 2020-08-20 NOTE — TELEPHONE ENCOUNTER
----- Message from Onur Mckenna MD sent at 8/20/2020 11:13 AM CDT -----  Regarding: follow up  [facility] follow up.  Anytime in the next few weeks is fine.    Thanks    Onur

## 2020-08-20 NOTE — TELEPHONE ENCOUNTER
Called facility where pt is living. They stated pt is being followed by Dr. Cisneros and will not be going to [place]

## 2020-08-20 NOTE — TELEPHONE ENCOUNTER
Called living facility where pt is living and was told pt is being followed by Dr. Raines. He performed angiogram on pt and pt will not be coming back to [place].     Routing comment

## 2020-08-21 NOTE — TELEPHONE ENCOUNTER
Called [facility] to see if vv could be done. Spoke with Bryce. She stated she will have charge nurse call me.

## 2021-01-10 NOTE — PLAN OF CARE
Problem: SLP Goal  Goal: SLP Goal  Description  Speech Language Pathology Goals  Goals expected to be met by 4/30  1. Pt will tolerate puree diet with nectar thick liquids without overt s/s aspiration.   2. Pt will tolerate trials of thin liquids without overt s/s aspiration.   3. Pt will tolerate trials of solids without overt s/s aspiration.  4. Pt will answer simple y/n questions via any modality with 60% accy.  5. Pt will follow simple commands with 50% accy given max cues.   6. Pt will complete auto speech tasks with 30% accy given max cues.      Outcome: Ongoing, Progressing    Rec puree diet with nectar thick liquids with strict aspiration precautions. Pt would benefit from modified barium swallow study to ensure safest, least restrictive diet.  DARREN Box, CCC/SLP  4/23/2020        no

## 2024-03-02 NOTE — PLAN OF CARE
[facility]     Department of Hospital Medicine     [address] [place] [address]     [phone] after hours  [phone] fax       NURSING HOME ORDERS    04/28/2020    Admit to Nursing Home:  Skilled Bed                                                 Diagnoses:  Active Hospital Problems    Diagnosis  POA    *Stroke due to occlusion of left carotid artery [I63.232]  Yes     Priority: 1 - High    Dysphagia [R13.10]  Yes     Priority: 2     Stenosis of right internal carotid artery [I65.21]  Yes     Priority: 2     Essential hypertension [I10]  Yes     Priority: 3     Mixed hyperlipidemia [E78.2]  Yes     Priority: 4     Cytotoxic cerebral edema [G93.6]  Yes     Priority: 4       Resolved Hospital Problems   No resolved problems to display.       Patient is homebound due to:  Stroke due to occlusion of left carotid artery    Allergies:Review of patient's allergies indicates:  No Known Allergies    Vitals:  Every shift (Skilled Nursing patients)    Diet: Cardiac Puree diet, [place]umba-thickened liquids    Supplement:  1 container three times a day with meals                         Type:  Boost pudding    Acitivities: As tolerated       LABS:  Per facility protocol    Nursing Precautions:    - Aspiration precautions:             - Total assistance with meals            -  Upright 90 degrees befor during and after meals             -  Suction at bedside          - Fall precautions per nursing home protocol   - Seizure precaution per care home protocol   - Decubitus precautions:        -  for positioning   - Pressure reducing foam mattress   - Turn patient every two hours. Use wedge pillows to anchor patient    CONSULTS:     Physical Therapy to evaluate and treat     Occupational Therapy to evaluate and treat     Speech Therapy  to evaluate and treat     Nutrition to evaluate and recommend diet     Psychiatry to evaluate and follow patients for  delirium, if needed    MISCELLANEOUS CARE:       Routine Skin for Bedridden Patients:  Apply moisture barrier cream to all    skin folds and wet areas in perineal area daily and after baths and                           all bowel movements.    Medications: Discontinue all previous medication orders, if any. See new list below.   Teresa Vaughan   Home Medication Instructions RUDY:[id number]    Printed on:04/28/20 5924   Medication Information                      aspirin (ECOTRIN) 81 MG EC tablet  Take 1 tablet (81 mg total) by mouth once daily.             atorvastatin (LIPITOR) 40 MG tablet  Take 1 tablet (40 mg total) by mouth once daily.             clopidogreL (PLAVIX) 75 mg tablet  Take 1 tablet (75 mg total) by mouth once daily.             multivitamin capsule  Take 1 capsule by mouth once daily.             polyethylene glycol (GLYCOLAX) 17 gram PwPk  Take 17 g by mouth once daily.             senna-docusate 8.6-50 mg (PERICOLACE) 8.6-50 mg per tablet  Take 1 tablet by mouth 2 (two) times daily.                   Follow-up With  Details  Why  Contact Info     Kori Richardson NP  In 1 week  Call your PCP to set up hospital follow-up appt within 1 week of discharge.  [address] [address]  [phone]     [facility] VASCULAR NEUROLOGY  In 6 weeks  Someone will call you to set up hospital follow-up in 4-8 weeks. If nobody calls within 1 week, call number above to schedule an appt.  [address] [facility] [address]  [phone]               _________________________________  Kelley Sales PA-C  04/28/2020     same name as above